# Patient Record
Sex: MALE | Race: AMERICAN INDIAN OR ALASKA NATIVE | Employment: UNEMPLOYED | URBAN - METROPOLITAN AREA
[De-identification: names, ages, dates, MRNs, and addresses within clinical notes are randomized per-mention and may not be internally consistent; named-entity substitution may affect disease eponyms.]

---

## 2018-06-10 ENCOUNTER — HOSPITAL ENCOUNTER (EMERGENCY)
Age: 28
Discharge: PSYCHIATRIC OR SUBSTANCE USE FACILITY | End: 2018-06-11
Attending: EMERGENCY MEDICINE
Payer: COMMERCIAL

## 2018-06-10 DIAGNOSIS — D72.829 LEUKOCYTOSIS, UNSPECIFIED TYPE: ICD-10-CM

## 2018-06-10 DIAGNOSIS — F22 DELUSIONAL THOUGHTS (HCC): Primary | ICD-10-CM

## 2018-06-10 LAB
ABSOLUTE EOS #: 0.09 K/UL (ref 0–0.44)
ABSOLUTE IMMATURE GRANULOCYTE: 0.07 K/UL (ref 0–0.3)
ABSOLUTE LYMPH #: 2.23 K/UL (ref 1.1–3.7)
ABSOLUTE MONO #: 1.1 K/UL (ref 0.1–1.2)
ACETAMINOPHEN LEVEL: <5 UG/ML (ref 10–30)
ALBUMIN SERPL-MCNC: 4.7 G/DL (ref 3.5–5.2)
ALBUMIN/GLOBULIN RATIO: 1.3 (ref 1–2.5)
ALP BLD-CCNC: 97 U/L (ref 40–129)
ALT SERPL-CCNC: 26 U/L (ref 5–41)
AMPHETAMINE SCREEN URINE: NEGATIVE
ANION GAP SERPL CALCULATED.3IONS-SCNC: 14 MMOL/L (ref 9–17)
AST SERPL-CCNC: 14 U/L
BARBITURATE SCREEN URINE: NEGATIVE
BASOPHILS # BLD: 0 % (ref 0–2)
BASOPHILS ABSOLUTE: 0.06 K/UL (ref 0–0.2)
BENZODIAZEPINE SCREEN, URINE: NEGATIVE
BILIRUB SERPL-MCNC: 0.27 MG/DL (ref 0.3–1.2)
BILIRUBIN URINE: NEGATIVE
BUN BLDV-MCNC: 11 MG/DL (ref 6–20)
BUN/CREAT BLD: ABNORMAL (ref 9–20)
BUPRENORPHINE URINE: NORMAL
CALCIUM SERPL-MCNC: 9.6 MG/DL (ref 8.6–10.4)
CANNABINOID SCREEN URINE: NEGATIVE
CHLORIDE BLD-SCNC: 101 MMOL/L (ref 98–107)
CO2: 25 MMOL/L (ref 20–31)
COCAINE METABOLITE, URINE: NEGATIVE
COLOR: YELLOW
COMMENT UA: ABNORMAL
CREAT SERPL-MCNC: 0.71 MG/DL (ref 0.7–1.2)
DIFFERENTIAL TYPE: ABNORMAL
EOSINOPHILS RELATIVE PERCENT: 1 % (ref 1–4)
ETHANOL PERCENT: <0.01 %
ETHANOL: <10 MG/DL
GFR AFRICAN AMERICAN: >60 ML/MIN
GFR NON-AFRICAN AMERICAN: >60 ML/MIN
GFR SERPL CREATININE-BSD FRML MDRD: ABNORMAL ML/MIN/{1.73_M2}
GFR SERPL CREATININE-BSD FRML MDRD: ABNORMAL ML/MIN/{1.73_M2}
GLUCOSE BLD-MCNC: 95 MG/DL (ref 70–99)
GLUCOSE URINE: NEGATIVE
HCT VFR BLD CALC: 45.2 % (ref 40.7–50.3)
HEMOGLOBIN: 15.1 G/DL (ref 13–17)
IMMATURE GRANULOCYTES: 0 %
KETONES, URINE: ABNORMAL
LEUKOCYTE ESTERASE, URINE: NEGATIVE
LYMPHOCYTES # BLD: 12 % (ref 24–43)
MCH RBC QN AUTO: 29.7 PG (ref 25.2–33.5)
MCHC RBC AUTO-ENTMCNC: 33.4 G/DL (ref 28.4–34.8)
MCV RBC AUTO: 88.8 FL (ref 82.6–102.9)
MDMA URINE: NORMAL
METHADONE SCREEN, URINE: NEGATIVE
METHAMPHETAMINE, URINE: NORMAL
MONOCYTES # BLD: 6 % (ref 3–12)
NITRITE, URINE: NEGATIVE
NRBC AUTOMATED: 0 PER 100 WBC
OPIATES, URINE: NEGATIVE
OXYCODONE SCREEN URINE: NEGATIVE
PDW BLD-RTO: 12.5 % (ref 11.8–14.4)
PH UA: 6.5 (ref 5–8)
PHENCYCLIDINE, URINE: NEGATIVE
PLATELET # BLD: 250 K/UL (ref 138–453)
PLATELET ESTIMATE: ABNORMAL
PMV BLD AUTO: 8.6 FL (ref 8.1–13.5)
POTASSIUM SERPL-SCNC: 4.1 MMOL/L (ref 3.7–5.3)
PROPOXYPHENE, URINE: NORMAL
PROTEIN UA: NEGATIVE
RBC # BLD: 5.09 M/UL (ref 4.21–5.77)
RBC # BLD: ABNORMAL 10*6/UL
SALICYLATE LEVEL: <1 MG/DL (ref 3–10)
SEG NEUTROPHILS: 81 % (ref 36–65)
SEGMENTED NEUTROPHILS ABSOLUTE COUNT: 15.23 K/UL (ref 1.5–8.1)
SODIUM BLD-SCNC: 140 MMOL/L (ref 135–144)
SPECIFIC GRAVITY UA: 1.01 (ref 1–1.03)
TEST INFORMATION: NORMAL
TOTAL PROTEIN: 8.3 G/DL (ref 6.4–8.3)
TOXIC TRICYCLIC SC,BLOOD: NEGATIVE
TRICYCLIC ANTIDEPRESSANTS, UR: NORMAL
TSH SERPL DL<=0.05 MIU/L-ACNC: 1.28 MIU/L (ref 0.3–5)
TURBIDITY: CLEAR
URINE HGB: NEGATIVE
UROBILINOGEN, URINE: NORMAL
WBC # BLD: 18.8 K/UL (ref 3.5–11.3)
WBC # BLD: ABNORMAL 10*3/UL

## 2018-06-10 PROCEDURE — 81003 URINALYSIS AUTO W/O SCOPE: CPT

## 2018-06-10 PROCEDURE — G0384 LEV 5 HOSP TYPE B ED VISIT: HCPCS

## 2018-06-10 PROCEDURE — 80053 COMPREHEN METABOLIC PANEL: CPT

## 2018-06-10 PROCEDURE — 84443 ASSAY THYROID STIM HORMONE: CPT

## 2018-06-10 PROCEDURE — 85025 COMPLETE CBC W/AUTO DIFF WBC: CPT

## 2018-06-10 PROCEDURE — 80307 DRUG TEST PRSMV CHEM ANLYZR: CPT

## 2018-06-10 PROCEDURE — G0480 DRUG TEST DEF 1-7 CLASSES: HCPCS

## 2018-06-10 RX ORDER — BENZTROPINE MESYLATE 1 MG/1
1 TABLET ORAL 3 TIMES DAILY
COMMUNITY
End: 2018-09-15

## 2018-06-10 RX ORDER — ZOLPIDEM TARTRATE 10 MG/1
TABLET ORAL NIGHTLY PRN
Status: ON HOLD | COMMUNITY
End: 2018-08-27

## 2018-06-10 RX ORDER — HYDROXYZINE PAMOATE 50 MG/1
50 CAPSULE ORAL 3 TIMES DAILY PRN
Status: ON HOLD | COMMUNITY
End: 2018-08-27

## 2018-06-10 ASSESSMENT — ENCOUNTER SYMPTOMS
ABDOMINAL PAIN: 0
SHORTNESS OF BREATH: 0
BACK PAIN: 0
EYE PAIN: 0
COUGH: 0
SORE THROAT: 0
VOMITING: 0
NAUSEA: 0

## 2018-06-11 VITALS
TEMPERATURE: 97.7 F | HEIGHT: 72 IN | BODY MASS INDEX: 24.65 KG/M2 | OXYGEN SATURATION: 100 % | RESPIRATION RATE: 14 BRPM | HEART RATE: 65 BPM | SYSTOLIC BLOOD PRESSURE: 134 MMHG | DIASTOLIC BLOOD PRESSURE: 74 MMHG | WEIGHT: 182 LBS

## 2018-08-26 ENCOUNTER — HOSPITAL ENCOUNTER (EMERGENCY)
Age: 28
Discharge: PSYCHIATRIC OR SUBSTANCE USE FACILITY | End: 2018-08-26
Attending: EMERGENCY MEDICINE
Payer: COMMERCIAL

## 2018-08-26 ENCOUNTER — HOSPITAL ENCOUNTER (INPATIENT)
Age: 28
LOS: 18 days | Discharge: HOME | DRG: 750 | End: 2018-09-13
Attending: PSYCHIATRY & NEUROLOGY | Admitting: PSYCHIATRY & NEUROLOGY
Payer: COMMERCIAL

## 2018-08-26 VITALS
TEMPERATURE: 98.8 F | BODY MASS INDEX: 24.65 KG/M2 | DIASTOLIC BLOOD PRESSURE: 90 MMHG | HEIGHT: 72 IN | WEIGHT: 182 LBS | RESPIRATION RATE: 15 BRPM | OXYGEN SATURATION: 99 % | HEART RATE: 99 BPM | SYSTOLIC BLOOD PRESSURE: 140 MMHG

## 2018-08-26 DIAGNOSIS — F20.0 PARANOID SCHIZOPHRENIA (HCC): Primary | ICD-10-CM

## 2018-08-26 PROCEDURE — 1240000000 HC EMOTIONAL WELLNESS R&B

## 2018-08-26 PROCEDURE — 99285 EMERGENCY DEPT VISIT HI MDM: CPT

## 2018-08-27 PROBLEM — F20.9 SCHIZOPHRENIA (HCC): Status: ACTIVE | Noted: 2018-08-27

## 2018-08-27 PROCEDURE — 90792 PSYCH DIAG EVAL W/MED SRVCS: CPT | Performed by: PSYCHIATRY & NEUROLOGY

## 2018-08-27 PROCEDURE — 1240000000 HC EMOTIONAL WELLNESS R&B

## 2018-08-27 PROCEDURE — 6370000000 HC RX 637 (ALT 250 FOR IP): Performed by: PSYCHIATRY & NEUROLOGY

## 2018-08-27 RX ORDER — ZOLPIDEM TARTRATE 10 MG/1
10 TABLET ORAL NIGHTLY PRN
Status: ON HOLD | COMMUNITY
End: 2018-10-03 | Stop reason: HOSPADM

## 2018-08-27 RX ORDER — PROPRANOLOL HYDROCHLORIDE 20 MG/1
10 TABLET ORAL 2 TIMES DAILY
Status: DISCONTINUED | OUTPATIENT
Start: 2018-08-27 | End: 2018-09-07

## 2018-08-27 RX ORDER — M-VIT,TX,IRON,MINS/CALC/FOLIC 27MG-0.4MG
1 TABLET ORAL DAILY
COMMUNITY

## 2018-08-27 RX ORDER — HALOPERIDOL DECANOATE 100 MG/ML
100 INJECTION INTRAMUSCULAR
Status: DISCONTINUED | OUTPATIENT
Start: 2018-09-18 | End: 2018-09-06

## 2018-08-27 RX ORDER — MAGNESIUM HYDROXIDE/ALUMINUM HYDROXICE/SIMETHICONE 120; 1200; 1200 MG/30ML; MG/30ML; MG/30ML
15 SUSPENSION ORAL EVERY 6 HOURS PRN
Status: DISCONTINUED | OUTPATIENT
Start: 2018-08-27 | End: 2018-09-13 | Stop reason: HOSPADM

## 2018-08-27 RX ORDER — ZOLPIDEM TARTRATE 10 MG/1
10 TABLET ORAL NIGHTLY PRN
Status: DISCONTINUED | OUTPATIENT
Start: 2018-08-27 | End: 2018-09-13 | Stop reason: HOSPADM

## 2018-08-27 RX ORDER — HALOPERIDOL 5 MG/ML
5 INJECTION INTRAMUSCULAR EVERY 4 HOURS PRN
Status: DISCONTINUED | OUTPATIENT
Start: 2018-08-27 | End: 2018-09-13 | Stop reason: HOSPADM

## 2018-08-27 RX ORDER — BENZTROPINE MESYLATE 1 MG/ML
2 INJECTION INTRAMUSCULAR; INTRAVENOUS 2 TIMES DAILY PRN
Status: DISCONTINUED | OUTPATIENT
Start: 2018-08-27 | End: 2018-09-13 | Stop reason: HOSPADM

## 2018-08-27 RX ORDER — HYDROXYZINE HYDROCHLORIDE 25 MG/1
50 TABLET, FILM COATED ORAL 3 TIMES DAILY PRN
Status: DISCONTINUED | OUTPATIENT
Start: 2018-08-27 | End: 2018-09-13 | Stop reason: HOSPADM

## 2018-08-27 RX ORDER — HALOPERIDOL DECANOATE 100 MG/ML
INJECTION INTRAMUSCULAR
Status: ON HOLD | COMMUNITY
End: 2018-09-13 | Stop reason: HOSPADM

## 2018-08-27 RX ORDER — NICOTINE 21 MG/24HR
1 PATCH, TRANSDERMAL 24 HOURS TRANSDERMAL DAILY
Status: DISCONTINUED | OUTPATIENT
Start: 2018-08-27 | End: 2018-09-13 | Stop reason: HOSPADM

## 2018-08-27 RX ORDER — TRAZODONE HYDROCHLORIDE 50 MG/1
50 TABLET ORAL NIGHTLY PRN
Status: DISCONTINUED | OUTPATIENT
Start: 2018-08-27 | End: 2018-09-13 | Stop reason: HOSPADM

## 2018-08-27 RX ORDER — PROPRANOLOL HYDROCHLORIDE 10 MG/1
10 TABLET ORAL 2 TIMES DAILY
COMMUNITY

## 2018-08-27 RX ORDER — NICOTINE 21 MG/24HR
1 PATCH, TRANSDERMAL 24 HOURS TRANSDERMAL EVERY 24 HOURS
Status: ON HOLD | COMMUNITY
End: 2018-10-03 | Stop reason: HOSPADM

## 2018-08-27 RX ORDER — OLANZAPINE 5 MG/1
5 TABLET ORAL
Status: ACTIVE | OUTPATIENT
Start: 2018-08-27 | End: 2018-08-27

## 2018-08-27 RX ORDER — BUSPIRONE HYDROCHLORIDE 15 MG/1
15 TABLET ORAL 3 TIMES DAILY
Status: ON HOLD | COMMUNITY
End: 2018-09-13 | Stop reason: HOSPADM

## 2018-08-27 RX ORDER — ACETAMINOPHEN 325 MG/1
650 TABLET ORAL EVERY 4 HOURS PRN
Status: DISCONTINUED | OUTPATIENT
Start: 2018-08-27 | End: 2018-09-13 | Stop reason: HOSPADM

## 2018-08-27 RX ORDER — HALOPERIDOL DECANOATE 100 MG/ML
INJECTION INTRAMUSCULAR
Status: ON HOLD | COMMUNITY
End: 2018-08-27

## 2018-08-27 RX ORDER — M-VIT,TX,IRON,MINS/CALC/FOLIC 27MG-0.4MG
1 TABLET ORAL DAILY
Status: DISCONTINUED | OUTPATIENT
Start: 2018-08-27 | End: 2018-09-13 | Stop reason: HOSPADM

## 2018-08-27 RX ORDER — BENZTROPINE MESYLATE 1 MG/1
1 TABLET ORAL 3 TIMES DAILY
Status: DISCONTINUED | OUTPATIENT
Start: 2018-08-27 | End: 2018-09-06

## 2018-08-27 RX ORDER — BUSPIRONE HYDROCHLORIDE 15 MG/1
15 TABLET ORAL 3 TIMES DAILY
Status: DISCONTINUED | OUTPATIENT
Start: 2018-08-27 | End: 2018-09-13 | Stop reason: HOSPADM

## 2018-08-27 RX ADMIN — BENZTROPINE MESYLATE 1 MG: 1 TABLET ORAL at 21:23

## 2018-08-27 RX ADMIN — ZOLPIDEM TARTRATE 10 MG: 10 TABLET, FILM COATED ORAL at 21:22

## 2018-08-27 RX ADMIN — BUSPIRONE HYDROCHLORIDE 15 MG: 15 TABLET ORAL at 21:22

## 2018-08-27 RX ADMIN — PROPRANOLOL HYDROCHLORIDE 10 MG: 20 TABLET ORAL at 21:22

## 2018-08-27 ASSESSMENT — SLEEP AND FATIGUE QUESTIONNAIRES
DIFFICULTY ARISING: NO
DIFFICULTY STAYING ASLEEP: NO
SLEEP PATTERN: NORMAL
RESTFUL SLEEP: YES
DIFFICULTY FALLING ASLEEP: NO
AVERAGE NUMBER OF SLEEP HOURS: 8
DO YOU USE A SLEEP AID: YES
DO YOU HAVE DIFFICULTY SLEEPING: NO

## 2018-08-27 ASSESSMENT — LIFESTYLE VARIABLES
HISTORY_ALCOHOL_USE: NO
HISTORY_ALCOHOL_USE: NO

## 2018-08-27 ASSESSMENT — PAIN SCALES - GENERAL: PAINLEVEL_OUTOF10: 0
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
## 2018-08-27 NOTE — BH NOTE
`[facility]  Admission Note     Admission Type:   Admission Type: Voluntary    Reason for admission:  Reason for Admission: Voluntary from [facility]. Paranoia, delusional, and unable to care for self. Feels people in group home want to kill him, slit wrists. Denies all just states he is here because of \"chaos in the group home\"    PATIENT STRENGTHS:  Strengths: Medication Compliance, No significant Physical Illness    Patient Strengths and Limitations:  Limitations: Unrealistic self-view, Tendency to isolate self    Addictive Behavior:   Addictive Behavior  In the past 3 months, have you felt or has someone told you that you have a problem with:  : None  Do you have a history of Chemical Use?: No  Do you have a history of Alcohol Use?: No  Do you have a history of Street Drug Abuse?: No  Histroy of Prescripton Drug Abuse?: No    Medical Problems:   History reviewed. No pertinent past medical history.     Status EXAM:  Status and Exam  Normal: No  Facial Expression: Avoids Gaze, Flat  Affect: Blunt  Level of Consciousness: Alert  Mood:Normal: No  Mood: Depressed, Anxious  Motor Activity:Normal: Yes  Interview Behavior: Cooperative, Evasive  Preception: [place] to Person, [place] to Time, [place] to Place  Attention:Normal: No  Attention: Distractible  Thought Processes: Blocking  Thought Content:Normal: No  Thought Content: Paranoia, Preoccupations  Hallucinations: None  Delusions: Yes  Delusions: Persecution  Memory:Normal: No  Memory: Poor Recent  Insight and Judgment: No  Insight and Judgment: Poor Judgment, Poor Insight  Present Suicidal Ideation: No  Present Homicidal Ideation: No    Tobacco Screening:  Practical Counseling, on admission, taryn X, if applicable and completed (first 3 are required if patient doesn't refuse):            ( )  Recognizing danger situations (included triggers and roadblocks)                    ( )  Coping skills (new ways to manage stress, exercise, relaxation techniques,

## 2018-08-27 NOTE — ED NOTES
Pt resting in bed, NAD noted rr even and unlabored. Pt denies any needs at this time, will continue to monitor.      Juanis Deal, RN  08/26/18 9206

## 2018-08-27 NOTE — ED PROVIDER NOTES
NECK: Normal ROM, No jugular venous distention, No meningeal signs. RESPIRATORY CHEST: No respiratory distress. ABDOMEN: Abdomen is nontender, No distension. No pulsatile masses palpated. BACK:  No midline bony tenderness to palpation. UPPER EXTREMITY: Inspection normal, No cyanosis. LOWER EXTREMITY: Pulses are 2+ and equal bilaterally with cap refill < 2 seconds. NEURO: GCS is 15.  5/5 strength in bilateral lower extremities with sensation to light touch intact. DTR's are 2+ bilaterally with bilateral downgoing babinski's. DP/PT pulses are 2+, strong, and equal bilaterally. SKIN: Skin is warm, Skin is dry. PSYCHIATRIC: Oriented X 3, affect is Appropriate. is  having paranoid delusions    Diagnostic Results     LABS:  Not indicated    RADIOLOGY:  Not indicated    Medical decision making  (MDM) / ED Course     This patient presents with symptoms consistent with an acute exacerbation of an underlying psychiatric disorder. Differential diagnosis includes depression, anxiety, bipolar disorder, schizophrenia, adjustment disorder, borderline personality disorder, malingering, delusional, disorder, substance induced psychosis, delusional disorder, metabolic dysfunction, toxicologic exposure. Presentation not consistent with acute organic causes to include delirium, dementia or drug induced disorders (acute ingestions or withdrawal; no evidence of toxidrome). The patient denies suicidal ideation and homicidal ideation and presents no risk to himself or others. Social work has been consulted to complete a more comprehensive psychiatric evaluation and to provide outpatient psychiatric resources. Procedures     None    Final impression      1.  [facility]         Disposition with plan     Disposition: transfer to José Pierre MD  Emergency Medicine Resident    (Please note that portions of this note were completed with a voice recognition program.  Efforts were made

## 2018-08-27 NOTE — ED NOTES
KATYA faxed voluntary to [facility] at [address] also faxed medical form to Michael Rosas 91, LSW  08/26/18 8216
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
## 2018-08-27 NOTE — PLAN OF CARE
Poor Insight  Present Suicidal Ideation: No  Present Homicidal Ideation: No    EDUCATION:   Learner Progress Toward Treatment Goals:  Reviewed group plans and strategies for care    Method:  Group therapy, medication compliance, individualized assessments and care planning    Outcome:  Needs reinforcement    PATIENT GOALS:  Pt did not identify, to be discussed with patient within 72 hours.     PLAN/TREATMENT RECOMMENDATIONS:   Group therapy, medications compliance, goal setting, individualized assessments and care, continue to monitor pt on unit      SHORT-TERM GOALS:   Time frame for Short-Term Goals:  5-7 days    LONG-TERM GOALS:  Time frame for Long-Term Goals:  6 months    Members Present in Team Meeting:     MELIDA Corona    Goal: Absence of homicidal ideation  Absence of homicidal ideation   Outcome: Ongoing      Problem: Tobacco Use:  Goal: Inpatient tobacco use cessation counseling participation  Inpatient tobacco use cessation counseling participation   Outcome: Ongoing

## 2018-08-28 PROCEDURE — 99232 SBSQ HOSP IP/OBS MODERATE 35: CPT | Performed by: PSYCHIATRY & NEUROLOGY

## 2018-08-28 PROCEDURE — 1240000000 HC EMOTIONAL WELLNESS R&B

## 2018-08-28 PROCEDURE — 6370000000 HC RX 637 (ALT 250 FOR IP): Performed by: PSYCHIATRY & NEUROLOGY

## 2018-08-28 RX ADMIN — BENZTROPINE MESYLATE 1 MG: 1 TABLET ORAL at 20:08

## 2018-08-28 RX ADMIN — BUSPIRONE HYDROCHLORIDE 15 MG: 15 TABLET ORAL at 14:12

## 2018-08-28 RX ADMIN — PROPRANOLOL HYDROCHLORIDE 10 MG: 20 TABLET ORAL at 20:08

## 2018-08-28 RX ADMIN — BUSPIRONE HYDROCHLORIDE 15 MG: 15 TABLET ORAL at 09:56

## 2018-08-28 RX ADMIN — PROPRANOLOL HYDROCHLORIDE 10 MG: 20 TABLET ORAL at 09:57

## 2018-08-28 RX ADMIN — ZOLPIDEM TARTRATE 10 MG: 10 TABLET, FILM COATED ORAL at 20:08

## 2018-08-28 RX ADMIN — MULTIPLE VITAMINS W/ MINERALS TAB 1 TABLET: TAB at 09:57

## 2018-08-28 RX ADMIN — BUSPIRONE HYDROCHLORIDE 15 MG: 15 TABLET ORAL at 20:08

## 2018-08-28 RX ADMIN — BENZTROPINE MESYLATE 1 MG: 1 TABLET ORAL at 09:57

## 2018-08-28 RX ADMIN — BENZTROPINE MESYLATE 1 MG: 1 TABLET ORAL at 14:12

## 2018-08-28 NOTE — BH NOTE
Psychoeducation Group Note    Date: 08/28/18  Start Time: 1610  End Time: 1650    Number Participants in Group:  7/19    Goal:  Patient will demonstrate increased interpersonal interaction   Topic: 7 Dimensions of wellness/Attitude    Discipline Responsible:   OT  AT   x Nsg.  RT  Other       Participation Level:     None  Minimal    Active Listener x Interactive    Monopolizing         Participation Quality:  x Appropriate  Inappropriate   x       Attentive        Intrusive   x       Sharing        Resistant   x       Supportive        Lethargic       Affective:   x Congruent  Incongruent  Blunted  Flat    Constricted  Anxious  Elated  Angry    Labile  Depressed  Other         Cognitive:  x Alert x Oriented PPTP     Concentration x G  F  P   Attention Span x G  F  P   Short-Term Memory  G  F  P   Long-Term Memory  G  F  P   ProblemSolving/  Decision Making x G  F  P   Ability to Process  Information x G  F  P      Contributing Factors             Delusional             Hallucinating             Flight of Ideas             Other:       Modes of Intervention:  x Education x Support x Exploration    Clarifying x Problem Solving  Confrontation    Socialization  Limit Setting  Reality Testing    Activity  Movement  Media    Other:            Response to Learning:  x Able to verbalize current knowledge/experience    Able to verbalize/acknowledge new learning    Able to retain information    Capable of insight    Able to change behavior    Progressing to goal    Other:        Comments:

## 2018-08-28 NOTE — PLAN OF CARE
Problem: Anger Management/Homicidal Ideation:  Goal: Absence of homicidal ideation  Absence of homicidal ideation   Outcome: Ongoing  Denies SI /HI and remains free from harm att.  Denies any frustration at,t pt remains calm and cooperative

## 2018-08-28 NOTE — H&P
HISTORY and Vipul Vazquez 5747         NAME:  Gideon Mitchell  MRN: [id number]   YOB: 1990   Date: 8/28/2018   Age: 32 y.o. Gender: male       COMPLAINT AND PRESENT HISTORY:     Gideon Mitchell is a 32 yr old male who has Bipolar and depression, He lives in a [address] and he felt the people there were trying to hurt him, He was afraid, He felt paranoid so he went to [facility] because he knew that place was safe and the Dr Admitted him here, He says he did not wish to hurt himself nor let the group home people hurt him, They were fighting over his RSS check which pays the rent,    He graduated from [address] [place] [address] and he did not go to college, Has worked [employer], and other jobs and worked for a friends, but does not have a career, He had a very short term incarceration in 2009 and he said that changed his life forever, He had not done anything wrong,     DIAGNOSTIC RESULTS   Radiology:     EKG:    Labs:  CBC: No results for input(s): WBC, HGB, PLT in the last 72 hours. BMP:  No results for input(s): NA, K, CL, CO2, BUN, CREATININE, GLUCOSE in the last 72 hours. Hepatic: No results for input(s): AST, ALT, ALB, BILITOT, ALKPHOS in the last 72 hours. CARDIAC ENZY: No results for input(s): CKTOTAL, CKMB, CKMBINDEX, TROPONINT, MYOGLOBIN in the last 72 hours. INR: No results for input(s): INR in the last 72 hours. BNP: No results for input(s): BNP in the last 72 hours. ABGs: No results found for: PHART, PO2ART, [id number]  Lipids: No results for input(s): CHOL, HDL in the last 72 hours. Invalid input(s): LDLCALCU  U/A:  Lab Results   Component Value Date    COLORU YELLOW 06/10/2018    SPECGRAV 1.010 06/10/2018    LEUKOCYTESUR NEGATIVE 06/10/2018    GLUCOSEU NEGATIVE 06/10/2018       PAST MEDICAL HISTORY   History reviewed. No pertinent past medical history.     Pt denies any history of Diabetes mellitus type 2, hypertension, stroke, heart disease, COPD, Asthma, GERD, HLD, Cancer,

## 2018-08-28 NOTE — FLOWSHEET NOTE
08/28/18 1400   Encounter Summary   Services provided to: Patient   Referral/Consult From: (Spirituality group)   Continue Visiting (8/28/18)   Complexity of Encounter Low   Length of Encounter 30 minutes   Spiritual/Catholic   Type Spiritual support   Intervention (Patient attended spirituality group led by Fr. Leisa Gross)

## 2018-08-28 NOTE — PLAN OF CARE
Problem: Anger Management/Homicidal Ideation:  Goal: Ability to verbalize frustrations and anger appropriately will improve  Ability to verbalize frustrations and anger appropriately will improve   Outcome: Ongoing  [address] [place] [address]  Day 3 Interdisciplinary Treatment Plan NOTE    Review Date & Time: 8/28/18 0927    Admission Type:   Admission Type: Voluntary    Reason for admission:  Reason for Admission: Voluntary from [facility]. Paranoia, delusional, and unable to care for self. Feels people in group home want to kill him, slit wrists. Denies all just states he is here because of \"chaos in the group home\"  Estimated Length of Stay:  5-7 days  Estimated Discharge Date Update:  9/2/18 to be determined by physician    PATIENT STRENGTHS:  Patient Strengths:Strengths: Communication, Connection to output provider, Medication Compliance, No significant Physical Illness  Patient Strengths and Limitations:Limitations: Unrealistic self-view, Tendency to isolate self  Addictive Behavior:Addictive Behavior  In the past 3 months, have you felt or has someone told you that you have a problem with:  : None  Do you have a history of Chemical Use?: No  Do you have a history of Alcohol Use?: No  Do you have a history of Street Drug Abuse?: No  Histroy of Prescripton Drug Abuse?: No  Medical Problems:History reviewed. No pertinent past medical history.     Risk:  Fall RiskTotal: 65  Roque Scale Roque Scale Score: 23  BVC Total: 0  Change in scores:  No Changes to plan of Care:  No    Status EXAM:   Status and Exam  Normal: No  Facial Expression: Flat  Affect: Appropriate  Level of Consciousness: Alert  Mood:Normal: No  Mood: Anxious  Motor Activity:Normal: Yes  Motor Activity: Decreased  Interview Behavior: Cooperative, Evasive  Preception: [place] to Person, [place] to Time, [place] to Place, [place] to Situation  Attention:Normal: No  Attention: Distractible  Thought Processes: Blocking  Thought Content:Normal: No  Thought Content: Poverty of Content, Preoccupations  Hallucinations: None  Delusions: Yes  Delusions: Persecution  Memory:Normal: No  Memory: Poor Recent  Insight and Judgment: No  Insight and Judgment: Poor Judgment, Poor Insight  Present Suicidal Ideation: No  Present Homicidal Ideation: No    Daily Assessment Last Entry:   Daily Sleep (WDL): Within Defined Limits         Patient Currently in Pain: No  Daily Nutrition (WDL): Within Defined Limits    Patient Monitoring:  Frequency of Checks: 4 times per hour, close    Psychiatric Symptoms:   Depression Symptoms  Depression Symptoms: No problems reported or observed. Anxiety Symptoms  Anxiety Symptoms: No problems reported or observed. Deana Symptoms  Deana Symptoms: No problems reported or observed. Psychosis Symptoms  Delusion Type: No problems reported or observed.     Suicide Risk CSSR-S:  1) Within the past month, have you wished you were dead or wished you could go to sleep and not wake up? : NO  2) Within the past month, have you actually had any thoughts of killing yourself? : NO  6)  Have you ever done anything, started to do anything, or prepared to do anything to end your life?: NO  Change in Result:  no Change in Plan of care:  no      EDUCATION:   Learner Progress Toward Treatment Goals:   Reviewed results and recommendations of this team, Reviewed group plan and strategies, Reviewed signs, symptoms and risk of self harm and violent behavior, Reviewed goals and plan of care    Method:  small group, individual verbal education    Outcome:   Verbalized by patient but needs reinforcement to obtain goals    PATIENT GOALS:  Short term: Talk to  one on one  Long term:  Find a new group home    PLAN/TREATMENT RECOMMENDATIONS UPDATE:  continue with group therapies, increased socialization, continue planning for after discharge goals, continue with medication compliance    SHORT-TERM GOALS UPDATE:   Time frame for Short-Term Goals:  5-7

## 2018-08-29 PROCEDURE — 99232 SBSQ HOSP IP/OBS MODERATE 35: CPT | Performed by: NURSE PRACTITIONER

## 2018-08-29 PROCEDURE — 6370000000 HC RX 637 (ALT 250 FOR IP): Performed by: NURSE PRACTITIONER

## 2018-08-29 PROCEDURE — 6370000000 HC RX 637 (ALT 250 FOR IP): Performed by: PSYCHIATRY & NEUROLOGY

## 2018-08-29 PROCEDURE — 1240000000 HC EMOTIONAL WELLNESS R&B

## 2018-08-29 RX ORDER — LAMOTRIGINE 25 MG/1
25 TABLET ORAL DAILY
Status: DISCONTINUED | OUTPATIENT
Start: 2018-08-29 | End: 2018-09-06

## 2018-08-29 RX ADMIN — MULTIPLE VITAMINS W/ MINERALS TAB 1 TABLET: TAB at 08:36

## 2018-08-29 RX ADMIN — BENZTROPINE MESYLATE 1 MG: 1 TABLET ORAL at 08:36

## 2018-08-29 RX ADMIN — BUSPIRONE HYDROCHLORIDE 15 MG: 15 TABLET ORAL at 20:31

## 2018-08-29 RX ADMIN — PROPRANOLOL HYDROCHLORIDE 10 MG: 20 TABLET ORAL at 08:35

## 2018-08-29 RX ADMIN — BENZTROPINE MESYLATE 1 MG: 1 TABLET ORAL at 14:30

## 2018-08-29 RX ADMIN — BENZTROPINE MESYLATE 1 MG: 1 TABLET ORAL at 20:32

## 2018-08-29 RX ADMIN — LAMOTRIGINE 25 MG: 25 TABLET ORAL at 17:25

## 2018-08-29 RX ADMIN — BUSPIRONE HYDROCHLORIDE 15 MG: 15 TABLET ORAL at 14:29

## 2018-08-29 RX ADMIN — BUSPIRONE HYDROCHLORIDE 15 MG: 15 TABLET ORAL at 08:35

## 2018-08-29 RX ADMIN — ZOLPIDEM TARTRATE 10 MG: 10 TABLET, FILM COATED ORAL at 20:32

## 2018-08-29 RX ADMIN — PROPRANOLOL HYDROCHLORIDE 10 MG: 20 TABLET ORAL at 20:31

## 2018-08-29 NOTE — PROGRESS NOTES
Charting done this shift reviewed by Electronically signed by Rock Pulido RN on 8/29/2018 at 3:21 AM

## 2018-08-29 NOTE — PROGRESS NOTES
Psychiatric Progress Note      Pertinent History & Psychiatric Examination    HPI: Marquis Wilburn is seen in his room today. He reports his mood is ok. Denies depression, expressed moderate anxiety. Sleep and appetite are ok. Denies SI/HI/AVH. Mild thought blocking noted. He continues to express paranoia and fear of returning to group home. Isolative and seclusive to his room. Will add Lamictal as previously planned and continue to monitor. Complaints of Pain: none  Functioning Relationships: good support system      Mental Status Evaluation:  Orientation: alertness: alert   Mood:. dysthymic      Affect:  Blunted, mood congruent      Appearance:  bearded and disheveled   Activity:  Within Normal Limits   Gait/Posture: Normal   Speech:  normal pitch and normal volume   Thought Process:  blocked and loose associations   Thought Content:  delusions   Sensorium:  person, place and time/date   Cognition:  grossly intact   Memory: WNL   Insight:  limited   Judgment: limited   Suicidal Ideations: denies suicidal ideation   Homicidal Ideations: Negative for homicidal ideation      Medication Side Effects: absent       Attention Span attention span appeared shorter than expected for age     Clinical Assessment Medical Decision    Axis I: paranoid schizophrenia    Precautions with Justification:   none    Medication Review/Mgmt: Receives Haldol Decanoate 100mg, last on 8/21/18    Medical Issues: History reviewed. No pertinent past medical history. Assessment of Risk for Harm to Self/Others:  Severe paranoia, single,  male    PLAN: Cont.  Haldol Decanoate, Lamictal 25mg started on admission    Patient's Response to Treatment: positive    Batsheva Marina CNP  8/29/2018  3:33 PM

## 2018-08-29 NOTE — PLAN OF CARE
Problem: Anger Management/Homicidal Ideation:  Goal: Ability to verbalize frustrations and anger appropriately will improve  Ability to verbalize frustrations and anger appropriately will improve   Outcome: Ongoing  Pt did not participate in Recovery Group at 1430 despite staff encouragement.

## 2018-08-30 LAB
ABSOLUTE EOS #: 0.5 K/UL (ref 0–0.4)
ABSOLUTE IMMATURE GRANULOCYTE: ABNORMAL K/UL (ref 0–0.3)
ABSOLUTE LYMPH #: 3.7 K/UL (ref 1–4.8)
ABSOLUTE MONO #: 0.6 K/UL (ref 0.1–1.3)
ALBUMIN SERPL-MCNC: 4.5 G/DL (ref 3.5–5.2)
ALBUMIN/GLOBULIN RATIO: ABNORMAL (ref 1–2.5)
ALP BLD-CCNC: 57 U/L (ref 40–129)
ALT SERPL-CCNC: 16 U/L (ref 5–41)
ANION GAP SERPL CALCULATED.3IONS-SCNC: 9 MMOL/L (ref 9–17)
AST SERPL-CCNC: 12 U/L
BASOPHILS # BLD: 1 % (ref 0–2)
BASOPHILS ABSOLUTE: 0.1 K/UL (ref 0–0.2)
BILIRUB SERPL-MCNC: 0.2 MG/DL (ref 0.3–1.2)
BUN BLDV-MCNC: 13 MG/DL (ref 6–20)
BUN/CREAT BLD: ABNORMAL (ref 9–20)
CALCIUM SERPL-MCNC: 9.5 MG/DL (ref 8.6–10.4)
CHLORIDE BLD-SCNC: 103 MMOL/L (ref 98–107)
CHOLESTEROL/HDL RATIO: 3.6
CHOLESTEROL: 131 MG/DL
CO2: 28 MMOL/L (ref 20–31)
CREAT SERPL-MCNC: 1 MG/DL (ref 0.7–1.2)
DIFFERENTIAL TYPE: ABNORMAL
EOSINOPHILS RELATIVE PERCENT: 5 % (ref 0–4)
ESTIMATED AVERAGE GLUCOSE: 94 MG/DL
GFR AFRICAN AMERICAN: >60 ML/MIN
GFR NON-AFRICAN AMERICAN: >60 ML/MIN
GFR SERPL CREATININE-BSD FRML MDRD: ABNORMAL ML/MIN/{1.73_M2}
GFR SERPL CREATININE-BSD FRML MDRD: ABNORMAL ML/MIN/{1.73_M2}
GLUCOSE BLD-MCNC: 90 MG/DL (ref 70–99)
HBA1C MFR BLD: 4.9 % (ref 4–6)
HCT VFR BLD CALC: 45.5 % (ref 41–53)
HDLC SERPL-MCNC: 36 MG/DL
HEMOGLOBIN: 15.2 G/DL (ref 13.5–17.5)
IMMATURE GRANULOCYTES: ABNORMAL %
LDL CHOLESTEROL: 67 MG/DL (ref 0–130)
LYMPHOCYTES # BLD: 39 % (ref 24–44)
MCH RBC QN AUTO: 30.9 PG (ref 26–34)
MCHC RBC AUTO-ENTMCNC: 33.5 G/DL (ref 31–37)
MCV RBC AUTO: 92.2 FL (ref 80–100)
MONOCYTES # BLD: 7 % (ref 1–7)
NRBC AUTOMATED: ABNORMAL PER 100 WBC
PDW BLD-RTO: 14.5 % (ref 11.5–14.9)
PLATELET # BLD: 275 K/UL (ref 150–450)
PLATELET ESTIMATE: ABNORMAL
PMV BLD AUTO: 6.9 FL (ref 6–12)
POTASSIUM SERPL-SCNC: 4.1 MMOL/L (ref 3.7–5.3)
RBC # BLD: 4.93 M/UL (ref 4.5–5.9)
RBC # BLD: ABNORMAL 10*6/UL
SEG NEUTROPHILS: 48 % (ref 36–66)
SEGMENTED NEUTROPHILS ABSOLUTE COUNT: 4.7 K/UL (ref 1.3–9.1)
SODIUM BLD-SCNC: 140 MMOL/L (ref 135–144)
TOTAL PROTEIN: 6.9 G/DL (ref 6.4–8.3)
TRIGL SERPL-MCNC: 140 MG/DL
TSH SERPL DL<=0.05 MIU/L-ACNC: 1.71 MIU/L (ref 0.3–5)
VLDLC SERPL CALC-MCNC: ABNORMAL MG/DL (ref 1–30)
WBC # BLD: 9.6 K/UL (ref 3.5–11)
WBC # BLD: ABNORMAL 10*3/UL

## 2018-08-30 PROCEDURE — 6370000000 HC RX 637 (ALT 250 FOR IP): Performed by: PSYCHIATRY & NEUROLOGY

## 2018-08-30 PROCEDURE — 84443 ASSAY THYROID STIM HORMONE: CPT

## 2018-08-30 PROCEDURE — 83036 HEMOGLOBIN GLYCOSYLATED A1C: CPT

## 2018-08-30 PROCEDURE — 99232 SBSQ HOSP IP/OBS MODERATE 35: CPT | Performed by: NURSE PRACTITIONER

## 2018-08-30 PROCEDURE — 85025 COMPLETE CBC W/AUTO DIFF WBC: CPT

## 2018-08-30 PROCEDURE — 80053 COMPREHEN METABOLIC PANEL: CPT

## 2018-08-30 PROCEDURE — 6370000000 HC RX 637 (ALT 250 FOR IP): Performed by: NURSE PRACTITIONER

## 2018-08-30 PROCEDURE — 36415 COLL VENOUS BLD VENIPUNCTURE: CPT

## 2018-08-30 PROCEDURE — 1240000000 HC EMOTIONAL WELLNESS R&B

## 2018-08-30 PROCEDURE — 80061 LIPID PANEL: CPT

## 2018-08-30 RX ADMIN — PROPRANOLOL HYDROCHLORIDE 10 MG: 20 TABLET ORAL at 08:36

## 2018-08-30 RX ADMIN — BENZTROPINE MESYLATE 1 MG: 1 TABLET ORAL at 08:36

## 2018-08-30 RX ADMIN — MULTIPLE VITAMINS W/ MINERALS TAB 1 TABLET: TAB at 08:36

## 2018-08-30 RX ADMIN — BUSPIRONE HYDROCHLORIDE 15 MG: 15 TABLET ORAL at 14:35

## 2018-08-30 RX ADMIN — PROPRANOLOL HYDROCHLORIDE 10 MG: 20 TABLET ORAL at 20:30

## 2018-08-30 RX ADMIN — BUSPIRONE HYDROCHLORIDE 15 MG: 15 TABLET ORAL at 20:31

## 2018-08-30 RX ADMIN — ZOLPIDEM TARTRATE 10 MG: 10 TABLET, FILM COATED ORAL at 20:31

## 2018-08-30 RX ADMIN — BENZTROPINE MESYLATE 1 MG: 1 TABLET ORAL at 20:31

## 2018-08-30 RX ADMIN — BENZTROPINE MESYLATE 1 MG: 1 TABLET ORAL at 14:35

## 2018-08-30 RX ADMIN — LAMOTRIGINE 25 MG: 25 TABLET ORAL at 08:36

## 2018-08-30 RX ADMIN — BUSPIRONE HYDROCHLORIDE 15 MG: 15 TABLET ORAL at 08:36
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
## 2018-08-30 NOTE — BH NOTE
Pt did not participate  In wellness at 1600 choosing to rest in room despite encouragement from staff to attend.

## 2018-08-30 NOTE — PLAN OF CARE
Problem: Anger Management/Homicidal Ideation:  Goal: Absence of homicidal ideation  Absence of homicidal ideation   Outcome: Ongoing  Psychoeducation Group Note    Date: 8/30/2018  Start Time: 0845  End Time: 6197    Number Participants in Group:  9    Goal:  Patient will demonstrate increased interpersonal interaction. Topic:  Goal Setting / Community Meeting    Discipline Responsible:   OT  AT  [facility]. X RT  Other       Participation Level:     None  Minimal   x Active Listener x Interactive    Monopolizing         Participation Quality:  x Appropriate  Inappropriate   x       Attentive        Intrusive   x       Sharing        Resistant          Supportive        Lethargic       Affective:    Congruent  Incongruent  Blunted x Flat    Constricted  Anxious  Elated  Angry    Labile  Depressed  Other  Bright       Cognitive:  x Alert x Oriented PPTP     Concentration x G  F  P   Attention Span x G  F  P   Short-Term Memory x G  F  P   Long-Term Memory  G  F  P   ProblemSolving/  Decision Making x G  F  P   Ability to Process  Information x G  F  P      Contributing Factors             Delusional             Hallucinating             Flight of Ideas             Other:       Modes of Intervention:  x Education x Support x Exploration   x Clarifying x Problem Solving x Confrontation   x Socialization x Limit Setting x Reality Testing   x Activity  Movement  Media    Other:            Response to Learning:  x Able to verbalize current knowledge/experience   x Able to verbalize/acknowledge new learning   x Able to retain information   x Capable of insight    Able to change behavior   x Progressing to goal    Other:        Goal for today: Stay positive. Adjust to medications. Have a better day today than yesterday.

## 2018-08-30 NOTE — PLAN OF CARE
Problem: Anger Management/Homicidal Ideation:  Goal: Ability to verbalize frustrations and anger appropriately will improve  Ability to verbalize frustrations and anger appropriately will improve   Outcome: Ongoing  Pt did not participate in Communication Skills Group at 1330 despite staff encouragement.

## 2018-08-30 NOTE — PLAN OF CARE
Problem: Anger Management/Homicidal Ideation:  Goal: Ability to verbalize frustrations and anger appropriately will improve  Ability to verbalize frustrations and anger appropriately will improve   Outcome: Ongoing  PSYCHOTHERAPY GROUP NOTE    Date: 8/30/18  Start Time: 11 AM  End Time: 1145 AM    Number Participants in Group:  10    Goal:  Patient will demonstrate increased interpersonal interaction   Topic: Support     Discipline Responsible:   OT  AT X SW  Nsg.  RT MHP Other       Participation Level:     None  Minimal    Active Listener x Interactive    Monopolizing         Participation Quality:  x Appropriate  Inappropriate          Attentive        Intrusive          Sharing        Resistant          Supportive        Lethargic       Affective:   x Congruent  Incongruent  Blunted  Flat    Constricted  Anxious  Elated  Angry    Labile  Depressed  Other         Cognitive:  x Alert x Oriented PPTP     Concentration  G x F  P   Attention Span  G x F  P   Short-Term Memory  G x F  P   Long-Term Memory  G x F  P   ProblemSolving/  Decision Making  G  F x P   Ability to Process  Information  G  F x P      Contributing Factors   x          Delusional             Hallucinating             Flight of Ideas             Other:       Modes of Intervention:   Education x Support  Exploration    Clarifying x Problem Solving  Confrontation   x Socialization  Limit Setting  Reality Testing    Activity  Movement  Media    Other:            Response to Learning:  x Able to verbalize current knowledge/experience    Able to verbalize/acknowledge new learning    Able to retain information    Capable of insight    Able to change behavior    Progressing to goal    Other:        Comments: Anita Bowden is attempting to murder him- denies MH sx/paranoia

## 2018-08-31 PROCEDURE — 1240000000 HC EMOTIONAL WELLNESS R&B

## 2018-08-31 PROCEDURE — 99232 SBSQ HOSP IP/OBS MODERATE 35: CPT | Performed by: PSYCHIATRY & NEUROLOGY

## 2018-08-31 PROCEDURE — 6370000000 HC RX 637 (ALT 250 FOR IP): Performed by: PSYCHIATRY & NEUROLOGY

## 2018-08-31 PROCEDURE — 6370000000 HC RX 637 (ALT 250 FOR IP): Performed by: NURSE PRACTITIONER

## 2018-08-31 RX ADMIN — LAMOTRIGINE 25 MG: 25 TABLET ORAL at 08:25

## 2018-08-31 RX ADMIN — ACETAMINOPHEN 650 MG: 325 TABLET, FILM COATED ORAL at 08:36

## 2018-08-31 RX ADMIN — BUSPIRONE HYDROCHLORIDE 15 MG: 15 TABLET ORAL at 14:22

## 2018-08-31 RX ADMIN — BENZTROPINE MESYLATE 1 MG: 1 TABLET ORAL at 14:22

## 2018-08-31 RX ADMIN — ZOLPIDEM TARTRATE 10 MG: 10 TABLET, FILM COATED ORAL at 21:20

## 2018-08-31 RX ADMIN — BUSPIRONE HYDROCHLORIDE 15 MG: 15 TABLET ORAL at 21:20

## 2018-08-31 RX ADMIN — BENZTROPINE MESYLATE 1 MG: 1 TABLET ORAL at 21:20

## 2018-08-31 RX ADMIN — MULTIPLE VITAMINS W/ MINERALS TAB 1 TABLET: TAB at 08:25

## 2018-08-31 RX ADMIN — BENZTROPINE MESYLATE 1 MG: 1 TABLET ORAL at 08:25

## 2018-08-31 RX ADMIN — BUSPIRONE HYDROCHLORIDE 15 MG: 15 TABLET ORAL at 08:25

## 2018-08-31 RX ADMIN — PROPRANOLOL HYDROCHLORIDE 10 MG: 20 TABLET ORAL at 21:21

## 2018-08-31 RX ADMIN — PROPRANOLOL HYDROCHLORIDE 10 MG: 20 TABLET ORAL at 08:25

## 2018-08-31 ASSESSMENT — PAIN DESCRIPTION - LOCATION
LOCATION: HEAD
LOCATION: HEAD

## 2018-08-31 ASSESSMENT — PAIN DESCRIPTION - PAIN TYPE
TYPE: ACUTE PAIN
TYPE: ACUTE PAIN

## 2018-08-31 ASSESSMENT — PAIN DESCRIPTION - DESCRIPTORS
DESCRIPTORS: HEADACHE
DESCRIPTORS: HEADACHE

## 2018-08-31 ASSESSMENT — PAIN SCALES - GENERAL
PAINLEVEL_OUTOF10: 0
PAINLEVEL_OUTOF10: 6

## 2018-08-31 ASSESSMENT — PAIN DESCRIPTION - FREQUENCY
FREQUENCY: CONTINUOUS
FREQUENCY: CONTINUOUS

## 2018-08-31 NOTE — PLAN OF CARE
Problem: Anger Management/Homicidal Ideation:  Goal: Ability to verbalize frustrations and anger appropriately will improve  Ability to verbalize frustrations and anger appropriately will improve   Outcome: Ongoing    Goal: Absence of homicidal ideation  Absence of homicidal ideation   Outcome: Ongoing  Pt denies homicidal thoughts

## 2018-08-31 NOTE — PROGRESS NOTES
Psychiatric Progress Note      Pertinent History & Psychiatric Examination    HPI: Ben Camara is seen in his room today. He continues to isolate himself, still presenting with paranoid delusional thinking and specifically talking about his group home was trying to kill him. He denies any current suicidal or homicidal ideations or plans, also denies any auditory or visual hallucinations, although some circumstantial thinking is evident. His thought processes are restricted. Complaints of Pain: none  Functioning Relationships: good support system      Mental Status Evaluation:  Orientation: alertness: alert   Mood:. dysthymic      Affect:  Restricted      Appearance:  bearded and disheveled   Activity:  Within Normal Limits   Gait/Posture: Normal   Speech:  normal pitch and normal volume   Thought Process:  restricted and circumstantial    Thought Content:  Delusions/PARANOID   Sensorium:  person, place and time/date   Cognition:  grossly intact   Memory: WNL   Insight:  limited   Judgment: limited   Suicidal Ideations: denies suicidal ideation   Homicidal Ideations: Negative for homicidal ideation      Medication Side Effects: absent       Attention Span attention span appeared shorter than expected for age     Clinical Assessment Medical Decision    Axis I: paranoid schizophrenia    Precautions with Justification:   none    Medication Review/Mgmt: Receives Haldol Decanoate 100mg, last on 8/21/18    Medical Issues: History reviewed. No pertinent past medical history. Assessment of Risk for Harm to Self/Others:  Severe paranoia, single,  male    PLAN: Cont. Haldol Decanoate, Lamictal 25mg daily.     Patient's Response to Treatment: positive    Michelle Hooks M.D.  8/31/2018  1:18 PM

## 2018-08-31 NOTE — PLAN OF CARE
Problem: Anger Management/Homicidal Ideation:  Goal: Absence of homicidal ideation  Absence of homicidal ideation   Outcome: Ongoing  Pt did not participate in Social Skills / Benefits of Listening to Music / Positive Coping Skills group at 1500 despite staff encouragement.

## 2018-08-31 NOTE — PLAN OF CARE
Problem: Anger Management/Homicidal Ideation:  Goal: Ability to verbalize frustrations and anger appropriately will improve  Ability to verbalize frustrations and anger appropriately will improve   Outcome: Ongoing  Pt did not attend Community Meeting/Goal Setting skills group at 0900 d/t resting in room despite staff invitation to attend.

## 2018-08-31 NOTE — PLAN OF CARE
Problem: Anger Management/Homicidal Ideation:  Goal: Ability to verbalize frustrations and anger appropriately will improve  Ability to verbalize frustrations and anger appropriately will improve   Outcome: Ongoing  Pt did not attend Creative Expression skills group at 1100 d/t resting in room despite staff invitation to attend.

## 2018-09-01 PROCEDURE — 1240000000 HC EMOTIONAL WELLNESS R&B

## 2018-09-01 PROCEDURE — 6370000000 HC RX 637 (ALT 250 FOR IP): Performed by: PSYCHIATRY & NEUROLOGY

## 2018-09-01 PROCEDURE — 99232 SBSQ HOSP IP/OBS MODERATE 35: CPT | Performed by: PSYCHIATRY & NEUROLOGY

## 2018-09-01 PROCEDURE — 6370000000 HC RX 637 (ALT 250 FOR IP): Performed by: NURSE PRACTITIONER

## 2018-09-01 RX ADMIN — PROPRANOLOL HYDROCHLORIDE 10 MG: 20 TABLET ORAL at 20:31

## 2018-09-01 RX ADMIN — BUSPIRONE HYDROCHLORIDE 15 MG: 15 TABLET ORAL at 14:59

## 2018-09-01 RX ADMIN — BUSPIRONE HYDROCHLORIDE 15 MG: 15 TABLET ORAL at 08:21

## 2018-09-01 RX ADMIN — ACETAMINOPHEN 650 MG: 325 TABLET, FILM COATED ORAL at 18:54

## 2018-09-01 RX ADMIN — BENZTROPINE MESYLATE 1 MG: 1 TABLET ORAL at 08:21

## 2018-09-01 RX ADMIN — BENZTROPINE MESYLATE 1 MG: 1 TABLET ORAL at 20:32

## 2018-09-01 RX ADMIN — BENZTROPINE MESYLATE 1 MG: 1 TABLET ORAL at 14:59

## 2018-09-01 RX ADMIN — LAMOTRIGINE 25 MG: 25 TABLET ORAL at 08:21

## 2018-09-01 RX ADMIN — ZOLPIDEM TARTRATE 10 MG: 10 TABLET, FILM COATED ORAL at 20:32

## 2018-09-01 RX ADMIN — HYDROXYZINE HYDROCHLORIDE 50 MG: 25 TABLET, FILM COATED ORAL at 18:54

## 2018-09-01 RX ADMIN — MULTIPLE VITAMINS W/ MINERALS TAB 1 TABLET: TAB at 08:21

## 2018-09-01 RX ADMIN — BUSPIRONE HYDROCHLORIDE 15 MG: 15 TABLET ORAL at 20:32

## 2018-09-01 ASSESSMENT — PAIN DESCRIPTION - PAIN TYPE: TYPE: CHRONIC PAIN

## 2018-09-01 ASSESSMENT — PAIN SCALES - GENERAL
PAINLEVEL_OUTOF10: 1
PAINLEVEL_OUTOF10: 3
PAINLEVEL_OUTOF10: 1

## 2018-09-01 ASSESSMENT — PAIN DESCRIPTION - ORIENTATION: ORIENTATION: RIGHT

## 2018-09-01 ASSESSMENT — PAIN DESCRIPTION - LOCATION: LOCATION: WRIST

## 2018-09-01 ASSESSMENT — PAIN DESCRIPTION - DESCRIPTORS: DESCRIPTORS: ACHING;DISCOMFORT

## 2018-09-01 ASSESSMENT — PAIN DESCRIPTION - FREQUENCY: FREQUENCY: INTERMITTENT

## 2018-09-01 NOTE — BH NOTE
Psychoeducation Group Note    Date: 09/01  Start Time:  1400 End Time: 1500    Number Participants in Group:  9/21    Goal:  Patient will demonstrate increased interpersonal interaction   Topic: Wellness Group Anxiety/ Daily Affirmations    Discipline Responsible:   OT  AT   X Nsg.  RT  Other       Participation Level:     None  Minimal   X Active Listener X Interactive    Monopolizing         Participation Quality:  X Appropriate  Inappropriate          Attentive        Intrusive          Sharing        Resistant          Supportive        Lethargic       Affective:   X Congruent  Incongruent  Blunted  Flat    Constricted  Anxious  Elated  Angry    Labile  Depressed  Other         Cognitive:  X Alert  Oriented PPTP     Concentration  G X F  P   Attention Span  G X F  P   Short-Term Memory  G X F  P   Long-Term Memory  G X F  P   ProblemSolving/  Decision Making  G X F  P   Ability to Process  Information  G X F  P      Contributing Factors             Delusional             Hallucinating             Flight of Ideas             Other:       Modes of Intervention:  X Education X Support  Exploration    Clarifying  Problem Solving  Confrontation   X Socialization  Limit Setting  Reality Testing   X Activity  Movement  Media    Other:            Response to Learning:  X Able to verbalize current knowledge/experience    Able to verbalize/acknowledge new learning    Able to retain information    Capable of insight    Able to change behavior    Progressing to goal   X Other:        Comments:   Discussed mental and physical aspects of anxiety, and coping skills to deal with anxiety. We then read Daily Affirmations and how they can be used as a coping skill.

## 2018-09-01 NOTE — PLAN OF CARE
Problem: Anger Management/Homicidal Ideation:  Goal: Ability to verbalize frustrations and anger appropriately will improve  Ability to verbalize frustrations and anger appropriately will improve   Outcome: Ongoing  Pt. Is out in dayroom. Pt. Is controlled and cooperative. Pt is guarded and only answers questions posed. Minimal interaction with peers. Pt. Remains with paranoid thinking. Says he cannot go back to his group home and feels the people there are trying to kill him. Pt. Remains safe on the unit. Q 15 minute checks for safety maintained. Goal: Absence of homicidal ideation  Absence of homicidal ideation   Outcome: Ongoing  Pt. Denies suicidal or homicidal thoughts. Pt. Remains safe on the unit. Q 15 minute checks for safety maintained.

## 2018-09-02 PROCEDURE — 1240000000 HC EMOTIONAL WELLNESS R&B

## 2018-09-02 PROCEDURE — 6370000000 HC RX 637 (ALT 250 FOR IP): Performed by: NURSE PRACTITIONER

## 2018-09-02 PROCEDURE — 6370000000 HC RX 637 (ALT 250 FOR IP): Performed by: PSYCHIATRY & NEUROLOGY

## 2018-09-02 RX ADMIN — PROPRANOLOL HYDROCHLORIDE 10 MG: 20 TABLET ORAL at 20:32

## 2018-09-02 RX ADMIN — ACETAMINOPHEN 650 MG: 325 TABLET, FILM COATED ORAL at 19:20

## 2018-09-02 RX ADMIN — BENZTROPINE MESYLATE 1 MG: 1 TABLET ORAL at 08:14

## 2018-09-02 RX ADMIN — LAMOTRIGINE 25 MG: 25 TABLET ORAL at 08:15

## 2018-09-02 RX ADMIN — BENZTROPINE MESYLATE 1 MG: 1 TABLET ORAL at 14:20

## 2018-09-02 RX ADMIN — MULTIPLE VITAMINS W/ MINERALS TAB 1 TABLET: TAB at 08:15

## 2018-09-02 RX ADMIN — ZOLPIDEM TARTRATE 10 MG: 10 TABLET, FILM COATED ORAL at 20:33

## 2018-09-02 RX ADMIN — BUSPIRONE HYDROCHLORIDE 15 MG: 15 TABLET ORAL at 08:15

## 2018-09-02 RX ADMIN — BUSPIRONE HYDROCHLORIDE 15 MG: 15 TABLET ORAL at 20:32

## 2018-09-02 RX ADMIN — HYDROXYZINE HYDROCHLORIDE 50 MG: 25 TABLET, FILM COATED ORAL at 20:33

## 2018-09-02 RX ADMIN — BENZTROPINE MESYLATE 1 MG: 1 TABLET ORAL at 20:32

## 2018-09-02 RX ADMIN — PROPRANOLOL HYDROCHLORIDE 10 MG: 20 TABLET ORAL at 08:14

## 2018-09-02 RX ADMIN — BUSPIRONE HYDROCHLORIDE 15 MG: 15 TABLET ORAL at 14:20

## 2018-09-02 ASSESSMENT — PAIN SCALES - GENERAL
PAINLEVEL_OUTOF10: 0
PAINLEVEL_OUTOF10: 1

## 2018-09-02 ASSESSMENT — PAIN DESCRIPTION - PAIN TYPE: TYPE: CHRONIC PAIN

## 2018-09-02 ASSESSMENT — PAIN DESCRIPTION - LOCATION: LOCATION: WRIST

## 2018-09-02 ASSESSMENT — PAIN DESCRIPTION - ORIENTATION: ORIENTATION: RIGHT

## 2018-09-03 PROCEDURE — 6360000002 HC RX W HCPCS: Performed by: PSYCHIATRY & NEUROLOGY

## 2018-09-03 PROCEDURE — 6370000000 HC RX 637 (ALT 250 FOR IP): Performed by: PSYCHIATRY & NEUROLOGY

## 2018-09-03 PROCEDURE — 6360000002 HC RX W HCPCS

## 2018-09-03 PROCEDURE — 1240000000 HC EMOTIONAL WELLNESS R&B

## 2018-09-03 PROCEDURE — 6370000000 HC RX 637 (ALT 250 FOR IP): Performed by: NURSE PRACTITIONER

## 2018-09-03 PROCEDURE — 99232 SBSQ HOSP IP/OBS MODERATE 35: CPT | Performed by: PSYCHIATRY & NEUROLOGY

## 2018-09-03 RX ORDER — DIPHENHYDRAMINE HYDROCHLORIDE 50 MG/ML
INJECTION INTRAMUSCULAR; INTRAVENOUS
Status: COMPLETED
Start: 2018-09-03 | End: 2018-09-03

## 2018-09-03 RX ORDER — DIPHENHYDRAMINE HYDROCHLORIDE 50 MG/ML
50 INJECTION INTRAMUSCULAR; INTRAVENOUS EVERY 6 HOURS PRN
Status: DISCONTINUED | OUTPATIENT
Start: 2018-09-03 | End: 2018-09-13 | Stop reason: HOSPADM

## 2018-09-03 RX ADMIN — HYDROXYZINE HYDROCHLORIDE 50 MG: 25 TABLET, FILM COATED ORAL at 08:24

## 2018-09-03 RX ADMIN — DIPHENHYDRAMINE HYDROCHLORIDE 50 MG: 50 INJECTION INTRAMUSCULAR; INTRAVENOUS at 11:10

## 2018-09-03 RX ADMIN — MULTIPLE VITAMINS W/ MINERALS TAB 1 TABLET: TAB at 08:24

## 2018-09-03 RX ADMIN — BENZTROPINE MESYLATE 1 MG: 1 TABLET ORAL at 20:04

## 2018-09-03 RX ADMIN — BENZTROPINE MESYLATE 1 MG: 1 TABLET ORAL at 08:24

## 2018-09-03 RX ADMIN — LAMOTRIGINE 25 MG: 25 TABLET ORAL at 08:24

## 2018-09-03 RX ADMIN — BUSPIRONE HYDROCHLORIDE 15 MG: 15 TABLET ORAL at 20:05

## 2018-09-03 RX ADMIN — BUSPIRONE HYDROCHLORIDE 15 MG: 15 TABLET ORAL at 08:24

## 2018-09-03 RX ADMIN — HYDROXYZINE HYDROCHLORIDE 50 MG: 25 TABLET, FILM COATED ORAL at 18:57

## 2018-09-03 RX ADMIN — PROPRANOLOL HYDROCHLORIDE 10 MG: 20 TABLET ORAL at 20:04

## 2018-09-03 RX ADMIN — ACETAMINOPHEN 650 MG: 325 TABLET, FILM COATED ORAL at 18:57

## 2018-09-03 RX ADMIN — DIPHENHYDRAMINE HYDROCHLORIDE 50 MG: 50 INJECTION, SOLUTION INTRAMUSCULAR; INTRAVENOUS at 11:10

## 2018-09-03 RX ADMIN — ZOLPIDEM TARTRATE 10 MG: 10 TABLET, FILM COATED ORAL at 20:05

## 2018-09-03 RX ADMIN — HALOPERIDOL LACTATE 5 MG: 5 INJECTION, SOLUTION INTRAMUSCULAR at 11:09

## 2018-09-03 RX ADMIN — PROPRANOLOL HYDROCHLORIDE 10 MG: 20 TABLET ORAL at 08:24

## 2018-09-03 ASSESSMENT — PAIN SCALES - GENERAL
PAINLEVEL_OUTOF10: 0
PAINLEVEL_OUTOF10: 6

## 2018-09-03 NOTE — PLAN OF CARE
Problem: Anger Management/Homicidal Ideation:  Intervention: Encourage to identify a list of anger triggers and processes to understand cues  Ongoing  Intervention: Assess coping skills and behavior  Assessment performed. Goal: Ability to verbalize frustrations and anger appropriately will improve  Ability to verbalize frustrations and anger appropriately will improve   Outcome: Ongoing  Patient denies all, states that he is ready for discharge once housing has been secured, feels safe in current environment, will utilize staff as needed and for 1:1. Pt. Is calm, cooperative and pleasant, is medication compliant, eats in the dining room and is seclusive to room. Pt. Was flat, guarded with poor eye contact during interview with writer. Pt. Was anxious AEB pacing in the hallway. Writer encouraged patient to attend groups to order to learn coping and life skills. Pt. Reported good sleep and good appetite. Q15 minute safety checks performed and pt. Safety maintained.   Goal: Absence of homicidal ideation  Absence of homicidal ideation   Outcome: Met This Shift      Problem: Tobacco Use:  Intervention: Tobacco-use cessation counseling  Ongoing    Goal: Inpatient tobacco use cessation counseling participation  Inpatient tobacco use cessation counseling participation   Outcome: Ongoing

## 2018-09-03 NOTE — PLAN OF CARE
Problem: Anger Management/Homicidal Ideation:  Goal: Absence of homicidal ideation  Absence of homicidal ideation   Outcome: Ongoing  Pt did not participate in Goal Setting / [employer] group at 1980 despite staff encouragement.

## 2018-09-04 PROCEDURE — 1240000000 HC EMOTIONAL WELLNESS R&B

## 2018-09-04 PROCEDURE — 6370000000 HC RX 637 (ALT 250 FOR IP): Performed by: PSYCHIATRY & NEUROLOGY

## 2018-09-04 PROCEDURE — 6370000000 HC RX 637 (ALT 250 FOR IP): Performed by: NURSE PRACTITIONER

## 2018-09-04 PROCEDURE — 99232 SBSQ HOSP IP/OBS MODERATE 35: CPT | Performed by: PSYCHIATRY & NEUROLOGY

## 2018-09-04 RX ADMIN — BUSPIRONE HYDROCHLORIDE 15 MG: 15 TABLET ORAL at 13:04

## 2018-09-04 RX ADMIN — LAMOTRIGINE 25 MG: 25 TABLET ORAL at 13:04

## 2018-09-04 RX ADMIN — BUSPIRONE HYDROCHLORIDE 15 MG: 15 TABLET ORAL at 20:59

## 2018-09-04 RX ADMIN — ZOLPIDEM TARTRATE 10 MG: 10 TABLET, FILM COATED ORAL at 20:59

## 2018-09-04 RX ADMIN — HYDROXYZINE HYDROCHLORIDE 50 MG: 25 TABLET, FILM COATED ORAL at 20:59

## 2018-09-04 RX ADMIN — BENZTROPINE MESYLATE 1 MG: 1 TABLET ORAL at 20:59

## 2018-09-04 RX ADMIN — PROPRANOLOL HYDROCHLORIDE 10 MG: 20 TABLET ORAL at 13:04

## 2018-09-04 RX ADMIN — PROPRANOLOL HYDROCHLORIDE 10 MG: 20 TABLET ORAL at 09:08

## 2018-09-04 RX ADMIN — BENZTROPINE MESYLATE 1 MG: 1 TABLET ORAL at 13:08

## 2018-09-04 RX ADMIN — PROPRANOLOL HYDROCHLORIDE 10 MG: 20 TABLET ORAL at 21:00

## 2018-09-04 RX ADMIN — MULTIPLE VITAMINS W/ MINERALS TAB 1 TABLET: TAB at 13:03

## 2018-09-04 ASSESSMENT — PAIN DESCRIPTION - PAIN TYPE: TYPE: ACUTE PAIN

## 2018-09-04 ASSESSMENT — PAIN DESCRIPTION - LOCATION: LOCATION: WRIST

## 2018-09-04 ASSESSMENT — PAIN SCALES - GENERAL: PAINLEVEL_OUTOF10: 6

## 2018-09-04 ASSESSMENT — PAIN DESCRIPTION - ORIENTATION: ORIENTATION: RIGHT

## 2018-09-04 NOTE — PLAN OF CARE
Problem: Anger Management/Homicidal Ideation:  Goal: Absence of homicidal ideation  Absence of homicidal ideation   Outcome: Ongoing  Pt denies any SI or HI and no harming behaviors noted. Quiet and preoccupied most of the time. Makes requests.

## 2018-09-04 NOTE — PLAN OF CARE
Problem: Anger Management/Homicidal Ideation:  Goal: Absence of homicidal ideation  Absence of homicidal ideation   Outcome: Ongoing  Pt did not participate in Positive Thinking / Improving Self Esteem / Changing Thought Process group at 1330 despite staff encouragement.

## 2018-09-04 NOTE — PLAN OF CARE
Problem: Anger Management/Homicidal Ideation:  Goal: Absence of homicidal ideation  Absence of homicidal ideation   Outcome: Ongoing  Pt denies thoughts of harming others and is agreeable to seeking out should thoughts of harming others arise. Safe environment maintained. Q15 minute checks for safety cont per unit policy. Will cont to monitor for safety and provides support and reassurance as needed.

## 2018-09-04 NOTE — PLAN OF CARE
Content:Normal: No  Thought Content: Delusions, Preoccupations  Hallucinations: None  Delusions: Yes  Delusions: Persecution  Memory:Normal: No  Memory: Poor Recent, Poor Remote  Insight and Judgment: No  Insight and Judgment: Poor Judgment, Poor Insight  Present Suicidal Ideation: No  Present Homicidal Ideation: No    Daily Assessment Last Entry:   Daily Sleep (WDL): Within Defined Limits         Patient Currently in Pain: Yes  Daily Nutrition (WDL): Within Defined Limits    Patient Monitoring:  Frequency of Checks: 4 times per hour, close    Psychiatric Symptoms:   Depression Symptoms  Depression Symptoms: Change in energy level, Feelings of helplessness, Feelings of hopelessess, Impaired concentration, Loss of interest, Isolative  Anxiety Symptoms  Anxiety Symptoms: Generalized  Deana Symptoms  Deana Symptoms: No problems reported or observed. Psychosis Symptoms  Delusion Type: No problems reported or observed. Suicide Risk CSSR-S:  1) Within the past month, have you wished you were dead or wished you could go to sleep and not wake up? : NO  2) Within the past month, have you actually had any thoughts of killing yourself? : NO  6)  Have you ever done anything, started to do anything, or prepared to do anything to end your life?: NO  Change in result:  No  Change in plan of care:  No    EDUCATION:   Learner Progress Toward Treatment Goals:   Reviewed results and recommendations of this team, reviewed group plan and strategies, reviewed signs, symptoms and risk of self harm and violent behavior, reviewed goals and plan of care. Method:  individual education, small group, verbal education. Outcome:  Patient refused to participate in treatment team meeting. Patient needs reinforcement to obtain goals. PATIENT GOALS:  Short term:  Patient refused to participate in treatment team meeting. Long term:   Patient refused to participate in treatment team meeting.     PLAN/TREATMENT RECOMMENDATIONS UPDATE:

## 2018-09-04 NOTE — PLAN OF CARE
Problem: Anger Management/Homicidal Ideation:  Goal: Ability to verbalize frustrations and anger appropriately will improve  Ability to verbalize frustrations and anger appropriately will improve   Outcome: Ongoing  Pt declined to attend psychotherapy at 1000 am despite encouragement. Pt offered 1:1 and refused.

## 2018-09-04 NOTE — PLAN OF CARE
Problem: Anger Management/Homicidal Ideation:  Goal: Ability to verbalize frustrations and anger appropriately will improve  Ability to verbalize frustrations and anger appropriately will improve   Outcome: Ongoing  Pt states that he had a bad morning but wants to have a better evening. Pt was accepting of medications offered. Pt states he is feeling more relaxed but is still anxious. Cooperative with staff and on unit.

## 2018-09-04 NOTE — PLAN OF CARE
Problem: Anger Management/Homicidal Ideation:  Goal: Ability to verbalize frustrations and anger appropriately will improve  Ability to verbalize frustrations and anger appropriately will improve   Outcome: Ongoing  Pt did not participate in Therapeutic Leisure Skills Group at 1100 despite staff encouragement.

## 2018-09-05 PROCEDURE — 6370000000 HC RX 637 (ALT 250 FOR IP): Performed by: PSYCHIATRY & NEUROLOGY

## 2018-09-05 PROCEDURE — 6370000000 HC RX 637 (ALT 250 FOR IP): Performed by: NURSE PRACTITIONER

## 2018-09-05 PROCEDURE — 99232 SBSQ HOSP IP/OBS MODERATE 35: CPT | Performed by: PSYCHIATRY & NEUROLOGY

## 2018-09-05 PROCEDURE — 1240000000 HC EMOTIONAL WELLNESS R&B

## 2018-09-05 RX ORDER — ARIPIPRAZOLE 5 MG/1
5 TABLET ORAL DAILY
Status: DISCONTINUED | OUTPATIENT
Start: 2018-09-05 | End: 2018-09-06

## 2018-09-05 RX ADMIN — HYDROXYZINE HYDROCHLORIDE 50 MG: 25 TABLET, FILM COATED ORAL at 08:26

## 2018-09-05 RX ADMIN — HYDROXYZINE HYDROCHLORIDE 50 MG: 25 TABLET, FILM COATED ORAL at 20:29

## 2018-09-05 RX ADMIN — LAMOTRIGINE 25 MG: 25 TABLET ORAL at 08:26

## 2018-09-05 RX ADMIN — BUSPIRONE HYDROCHLORIDE 15 MG: 15 TABLET ORAL at 20:29

## 2018-09-05 RX ADMIN — BENZTROPINE MESYLATE 1 MG: 1 TABLET ORAL at 14:19

## 2018-09-05 RX ADMIN — ARIPIPRAZOLE 5 MG: 5 TABLET ORAL at 14:19

## 2018-09-05 RX ADMIN — BENZTROPINE MESYLATE 1 MG: 1 TABLET ORAL at 08:26

## 2018-09-05 RX ADMIN — PROPRANOLOL HYDROCHLORIDE 10 MG: 20 TABLET ORAL at 08:26

## 2018-09-05 RX ADMIN — BUSPIRONE HYDROCHLORIDE 15 MG: 15 TABLET ORAL at 14:19

## 2018-09-05 RX ADMIN — PROPRANOLOL HYDROCHLORIDE 10 MG: 20 TABLET ORAL at 20:29

## 2018-09-05 RX ADMIN — MULTIPLE VITAMINS W/ MINERALS TAB 1 TABLET: TAB at 08:26

## 2018-09-05 RX ADMIN — ZOLPIDEM TARTRATE 10 MG: 10 TABLET, FILM COATED ORAL at 20:29

## 2018-09-05 RX ADMIN — BUSPIRONE HYDROCHLORIDE 15 MG: 15 TABLET ORAL at 08:26

## 2018-09-05 RX ADMIN — BENZTROPINE MESYLATE 1 MG: 1 TABLET ORAL at 20:29

## 2018-09-05 NOTE — PLAN OF CARE
Problem: Anger Management/Homicidal Ideation:  Goal: Absence of homicidal ideation  Absence of homicidal ideation   Outcome: Ongoing  Pt denies thoughts of self harm and harm to others is agreeable to seeking out should thoughts of self harm or homicidal thoughts should arise. Safe environment maintained. Q15 minute checks for safety cont per unit policy. Will cont to monitor for safety and provides support and reassurance as needed.

## 2018-09-05 NOTE — PLAN OF CARE
Problem: Anger Management/Homicidal Ideation:  Goal: Absence of homicidal ideation  Absence of homicidal ideation   Outcome: Ongoing  Pt did not participate in community meeting/ goals group at 0900 despite staff encouragement to attend.

## 2018-09-05 NOTE — PLAN OF CARE
Problem: Anger Management/Homicidal Ideation:  Goal: Absence of homicidal ideation  Absence of homicidal ideation   Outcome: Ongoing  Pt did not participate in Signs + Symptoms of MH Diagnosis / Mental Health Awareness / Social Skills group at 1330 despite staff encouragement.

## 2018-09-05 NOTE — PROGRESS NOTES
Psychiatric Progress Note      Pertinent History & Psychiatric Examination    HPI: Devika Flores is seen in the day area. He continues to isolate himself, still presenting with paranoid delusional thinking and specifically talking about about our nursing staff, talking about him, . He also doesn't think he has a thought disorder, reports that his PTSD has been causing him flashbacks. Doesn't want to stay here but also doesn't want to go to shelter. Social work trying to facilitate. Discussed alternative medications, talked about the pros called side effects and interactions of Abilify. Due to his tendency to see our staff as adversaries, he could potentially be a danger to others. Complaints of Pain: none  Functioning Relationships: good support system      Mental Status Evaluation:  Orientation: alertness: alert   Mood:. dysthymic      Affect:  Restricted      Appearance:  bearded and disheveled   Activity:  Within Normal Limits   Gait/Posture: Normal   Speech:  normal pitch and normal volume   Thought Process:  restricted and circumstantial    Thought Content:  Delusions/PARANOID   Sensorium:  person, place and time/date   Cognition:  grossly intact   Memory: WNL   Insight:  limited   Judgment: limited   Suicidal Ideations: denies suicidal ideation   Homicidal Ideations: Negative for homicidal ideation      Medication Side Effects: absent       Attention Span attention span appeared shorter than expected for age     Clinical Assessment Medical Decision    Axis I: paranoid schizophrenia    Precautions with Justification:   none    Medication Review/Mgmt: Receives Haldol Decanoate 100mg, last on 8/21/18    Medical Issues: History reviewed. No pertinent past medical history. Assessment of Risk for Harm to Self/Others:  Severe paranoia, single,  male    PLAN: Cont. Haldol Decanoate, Lamictal 25mg daily. And add abilify with a plan of adding a long acting injectable to replace haldol.     Patient's Response to Treatment: positive. We discussed the pros called side effects and interactions of abilify. Initially agreed to start it at 5 mg a day and titrate for affect.     Lindsey Mcgee M.D.  9/5/2018  1:39 PM

## 2018-09-05 NOTE — PLAN OF CARE
Problem: Anger Management/Homicidal Ideation:  Goal: Absence of homicidal ideation  Absence of homicidal ideation   Outcome: Ongoing  PSYCHOTHERAPY GROUP NOTE    Date: 9/5/18  Start Time: 10 AM  End Time: 1045AM    Number Participants in Group:  7    Goal:  Patient will demonstrate increased interpersonal interaction   Topic: Support     Discipline Responsible:   OT  AT x SW  Nsg.  RT MHP Other       Participation Level:     None  Minimal   x Active Listener  Interactive    Monopolizing         Participation Quality:  x Appropriate  Inappropriate   x       Attentive        Intrusive          Sharing        Resistant          Supportive        Lethargic       Affective:   x Congruent  Incongruent  Blunted  Flat    Constricted  Anxious  Elated  Angry    Labile  Depressed  Other         Cognitive:  x Alert x Oriented PPTP     Concentration  G x F  P   Attention Span  G x F  P   Short-Term Memory  G x F  P   Long-Term Memory  G x F  P   ProblemSolving/  Decision Making  G x F  P   Ability to Process  Information  G x F  P      Contributing Factors             Delusional             Hallucinating             Flight of Ideas             Other:       Modes of Intervention:   Education x Support  Exploration    Clarifying  Problem Solving  Confrontation   x Socialization  Limit Setting  Reality Testing    Activity  Movement  Media    Other:            Response to Learning:  x Able to verbalize current knowledge/experience    Able to verbalize/acknowledge new learning    Able to retain information    Capable of insight    Able to change behavior    Progressing to goal    Other:        Comments: Attempted to identify forms of effective communication

## 2018-09-06 LAB
AMPHETAMINE SCREEN URINE: NEGATIVE
BARBITURATE SCREEN URINE: NEGATIVE
BENZODIAZEPINE SCREEN, URINE: NEGATIVE
BUPRENORPHINE URINE: NORMAL
CANNABINOID SCREEN URINE: NEGATIVE
COCAINE METABOLITE, URINE: NEGATIVE
MDMA URINE: NORMAL
METHADONE SCREEN, URINE: NEGATIVE
METHAMPHETAMINE, URINE: NORMAL
OPIATES, URINE: NEGATIVE
OXYCODONE SCREEN URINE: NEGATIVE
PHENCYCLIDINE, URINE: NEGATIVE
PROPOXYPHENE, URINE: NORMAL
TEST INFORMATION: NORMAL
TRICYCLIC ANTIDEPRESSANTS, UR: NORMAL

## 2018-09-06 PROCEDURE — 80307 DRUG TEST PRSMV CHEM ANLYZR: CPT

## 2018-09-06 PROCEDURE — 6370000000 HC RX 637 (ALT 250 FOR IP): Performed by: PSYCHIATRY & NEUROLOGY

## 2018-09-06 PROCEDURE — 1240000000 HC EMOTIONAL WELLNESS R&B

## 2018-09-06 PROCEDURE — 6370000000 HC RX 637 (ALT 250 FOR IP): Performed by: NURSE PRACTITIONER

## 2018-09-06 PROCEDURE — 99232 SBSQ HOSP IP/OBS MODERATE 35: CPT | Performed by: PSYCHIATRY & NEUROLOGY

## 2018-09-06 RX ORDER — BENZTROPINE MESYLATE 0.5 MG/1
0.5 TABLET ORAL 2 TIMES DAILY PRN
Status: DISCONTINUED | OUTPATIENT
Start: 2018-09-06 | End: 2018-09-13 | Stop reason: HOSPADM

## 2018-09-06 RX ORDER — ARIPIPRAZOLE 10 MG/1
10 TABLET ORAL DAILY
Status: DISCONTINUED | OUTPATIENT
Start: 2018-09-07 | End: 2018-09-13 | Stop reason: HOSPADM

## 2018-09-06 RX ADMIN — HYDROXYZINE HYDROCHLORIDE 50 MG: 25 TABLET, FILM COATED ORAL at 20:13

## 2018-09-06 RX ADMIN — BENZTROPINE MESYLATE 1 MG: 1 TABLET ORAL at 08:29

## 2018-09-06 RX ADMIN — MULTIPLE VITAMINS W/ MINERALS TAB 1 TABLET: TAB at 08:28

## 2018-09-06 RX ADMIN — LAMOTRIGINE 25 MG: 25 TABLET ORAL at 08:28

## 2018-09-06 RX ADMIN — ARIPIPRAZOLE 5 MG: 5 TABLET ORAL at 08:29

## 2018-09-06 RX ADMIN — BUSPIRONE HYDROCHLORIDE 15 MG: 15 TABLET ORAL at 08:28

## 2018-09-06 RX ADMIN — BUSPIRONE HYDROCHLORIDE 15 MG: 15 TABLET ORAL at 13:58

## 2018-09-06 RX ADMIN — BUSPIRONE HYDROCHLORIDE 15 MG: 15 TABLET ORAL at 20:12

## 2018-09-06 RX ADMIN — HYDROXYZINE HYDROCHLORIDE 50 MG: 25 TABLET, FILM COATED ORAL at 15:47

## 2018-09-06 RX ADMIN — BENZTROPINE MESYLATE 1 MG: 1 TABLET ORAL at 13:58

## 2018-09-06 RX ADMIN — PROPRANOLOL HYDROCHLORIDE 10 MG: 20 TABLET ORAL at 08:28

## 2018-09-06 RX ADMIN — ZOLPIDEM TARTRATE 10 MG: 10 TABLET, FILM COATED ORAL at 20:12

## 2018-09-06 RX ADMIN — PROPRANOLOL HYDROCHLORIDE 10 MG: 20 TABLET ORAL at 20:12

## 2018-09-06 NOTE — CARE COORDINATION
Psychotherapy Group Note    Date:09/06/2018  Start Time: 11:00AM  End Time: 11:45AM    Number Participants in Group:  13/24    Goal:  Patient will demonstrate increased interpersonal interaction   Topic: [place] [facility]    Discipline Responsible:   OT  AT x SW  Nsg.  RT  Other       Participation Level:     None  Minimal   x Active Listener x Interactive    Monopolizing         Participation Quality:   Appropriate  Inappropriate   x       Attentive        Intrusive   x       Sharing        Resistant   x       Supportive        Lethargic       Affective:   x Congruent  Incongruent  Blunted  Flat    Constricted  Anxious  Elated  Angry    Labile  Depressed  Other         Cognitive:  x Alert x Oriented PPTP     Concentration  G  F x P   Attention Span  G  F x P   Short-Term Memory  G x F  P   Long-Term Memory  G x F  P   Problem Solving/  Decision Making  G x F  P   Ability to Process  Information  G x F  P      Contributing Factors             Delusional             Hallucinating             Flight of Ideas             Other:       Modes of Intervention:  x Education x Support x Exploration   x Clarifying x Problem Solving x Confrontation   x Socialization x Limit Setting x Reality Testing    Activity  Movement  Media    Other:            Response to Learning:   Able to verbalize current knowledge/experience    Able to verbalize/acknowledge new learning    Able to retain information    Capable of insight    Able to change behavior   x Progressing to goal    Other:        Comments:

## 2018-09-06 NOTE — PLAN OF CARE
Problem: Anger Management/Homicidal Ideation:  Goal: Ability to verbalize frustrations and anger appropriately will improve  Ability to verbalize frustrations and anger appropriately will improve   Outcome: Ongoing  No angry outburst noted this am  Patient pleasant on approach took am medications  Attends select group programming interacts with staff appropriately social with select peers on the unit  q15 minute visual safety checks continue per unit protocol

## 2018-09-07 PROCEDURE — 1240000000 HC EMOTIONAL WELLNESS R&B

## 2018-09-07 PROCEDURE — 6370000000 HC RX 637 (ALT 250 FOR IP): Performed by: PSYCHIATRY & NEUROLOGY

## 2018-09-07 PROCEDURE — 99232 SBSQ HOSP IP/OBS MODERATE 35: CPT | Performed by: PSYCHIATRY & NEUROLOGY

## 2018-09-07 RX ORDER — PROPRANOLOL HYDROCHLORIDE 20 MG/1
10 TABLET ORAL 3 TIMES DAILY
Status: DISCONTINUED | OUTPATIENT
Start: 2018-09-07 | End: 2018-09-13 | Stop reason: HOSPADM

## 2018-09-07 RX ADMIN — HYDROXYZINE HYDROCHLORIDE 50 MG: 25 TABLET, FILM COATED ORAL at 07:58

## 2018-09-07 RX ADMIN — PROPRANOLOL HYDROCHLORIDE 10 MG: 20 TABLET ORAL at 07:59

## 2018-09-07 RX ADMIN — BUSPIRONE HYDROCHLORIDE 15 MG: 15 TABLET ORAL at 14:24

## 2018-09-07 RX ADMIN — MULTIPLE VITAMINS W/ MINERALS TAB 1 TABLET: TAB at 07:58

## 2018-09-07 RX ADMIN — ACETAMINOPHEN 650 MG: 325 TABLET, FILM COATED ORAL at 09:49

## 2018-09-07 RX ADMIN — PROPRANOLOL HYDROCHLORIDE 10 MG: 20 TABLET ORAL at 20:33

## 2018-09-07 RX ADMIN — ZOLPIDEM TARTRATE 10 MG: 10 TABLET, FILM COATED ORAL at 20:32

## 2018-09-07 RX ADMIN — ARIPIPRAZOLE 10 MG: 10 TABLET ORAL at 07:59

## 2018-09-07 RX ADMIN — BUSPIRONE HYDROCHLORIDE 15 MG: 15 TABLET ORAL at 20:32

## 2018-09-07 RX ADMIN — BUSPIRONE HYDROCHLORIDE 15 MG: 15 TABLET ORAL at 07:59

## 2018-09-07 RX ADMIN — PROPRANOLOL HYDROCHLORIDE 10 MG: 20 TABLET ORAL at 14:23

## 2018-09-07 ASSESSMENT — PAIN DESCRIPTION - FREQUENCY: FREQUENCY: INTERMITTENT

## 2018-09-07 ASSESSMENT — PAIN SCALES - GENERAL
PAINLEVEL_OUTOF10: 1
PAINLEVEL_OUTOF10: 3

## 2018-09-07 ASSESSMENT — PAIN DESCRIPTION - PAIN TYPE: TYPE: ACUTE PAIN

## 2018-09-07 ASSESSMENT — PAIN DESCRIPTION - DESCRIPTORS: DESCRIPTORS: HEADACHE

## 2018-09-07 ASSESSMENT — PAIN DESCRIPTION - LOCATION: LOCATION: HEAD

## 2018-09-07 NOTE — PLAN OF CARE
Problem: Anger Management/Homicidal Ideation:  Goal: Ability to verbalize frustrations and anger appropriately will improve  Ability to verbalize frustrations and anger appropriately will improve   Outcome: Ongoing  Pt did not attend Community Meeting/Goal Setting skills group at [address] [place] [address] d resting in room despite staff invitation to attend.

## 2018-09-07 NOTE — PROGRESS NOTES
Psychiatric Progress Note      Pertinent History & Psychiatric Examination    HPI: [place] Lugo is seen in the day area. He is less isolative today and participation in groups has improved, although he is still presenting with less paranoid delusional thinking( the staff reported a decrease in intensity) . He also doesn't think he has a thought disorder, reports that his PTSD has been causing him flashbacks. Doesn't want to stay here but also doesn't want to go to shelter. Social work trying to facilitate. Tolerated oral/IM Abilify pretty well. Complaints of Pain: none  Functioning Relationships: good support system      Mental Status Evaluation:  Orientation: alertness: alert   Mood:. dysthymic      Affect:  More open       Appearance:  bearded and disheveled   Activity:  Within Normal Limits   Gait/Posture: Normal   Speech:  normal pitch and normal volume   Thought Process:  circumstantial    Thought Content:  Delusions/PARANOID   Sensorium:  person, place and time/date   Cognition:  grossly intact   Memory: WNL   Insight:  limited   Judgment: limited   Suicidal Ideations: denies suicidal ideation   Homicidal Ideations: Negative for homicidal ideation      Medication Side Effects: absent       Attention Span attention span appeared shorter than expected for age     Clinical Assessment Medical Decision    Axis I: paranoid schizophrenia    Precautions with Justification:   none    Medication Review/Mgmt: Receives Haldol Decanoate 100mg, last on 8/21/18    Medical Issues: History reviewed. No pertinent past medical history. Assessment of Risk for Harm to Self/Others:  Severe paranoia, single,  male    PLAN: Added a long acting injectable aristada    Patient's Response to Treatment: positive. We discussed the pros called side effects and interactions of abilify. Agreed on oral abilify 10 mg a day and will titrate for affect.     Asha Wright M.D.  9/7/2018  12:57 PM

## 2018-09-07 NOTE — PLAN OF CARE
Problem: Anger Management/Homicidal Ideation:  Goal: Ability to verbalize frustrations and anger appropriately will improve  Ability to verbalize frustrations and anger appropriately will improve   Outcome: Ongoing    Goal: Absence of homicidal ideation  Absence of homicidal ideation   Outcome: Ongoing  Patient denies homicidal thoughts this shift but remains focused the [employer] and his thoughts that they are following him. Pt continues with the idea that he is a informant for the [employer].

## 2018-09-07 NOTE — PLAN OF CARE
Problem: Anger Management/Homicidal Ideation:  Goal: Ability to verbalize frustrations and anger appropriately will improve  Ability to verbalize frustrations and anger appropriately will improve   Outcome: Ongoing  Pt verbalized frustrations appropriately this evening. No issues noted.

## 2018-09-07 NOTE — PLAN OF CARE
Problem: Anger Management/Homicidal Ideation:  Goal: Ability to verbalize frustrations and anger appropriately will improve  Ability to verbalize frustrations and anger appropriately will improve   Outcome: Ongoing  PSYCHOTHERAPY GROUP NOTE    Date: 9/7/18  Start Time: 10 am  End Time: 1055am    Number Participants in Group:  6    Goal:  Patient will demonstrate increased interpersonal interaction   Topic: Support     Discipline Responsible:   OT  AT xx SW  Nsg.  RT MHP Other       Participation Level:     None  Minimal    Active Listener  Interactive    Monopolizing         Participation Quality:  x Appropriate  Inappropriate   x       Attentive        Intrusive   x       Sharing        Resistant   x       Supportive        Lethargic       Affective:   x Congruent  Incongruent  Blunted  Flat    Constricted  Anxious  Elated  Angry    Labile  Depressed  Other         Cognitive:  x Alert x Oriented PPTP     Concentration  G x F  P   Attention Span  G x F  P   Short-Term Memory  G x F  P   Long-Term Memory  G x F  P   ProblemSolving/  Decision Making  G x F  P   Ability to Process  Information  G x F  P      Contributing Factors             Delusional             Hallucinating             Flight of Ideas             Other:       Modes of Intervention:   Education x Support  Exploration    Clarifying  Problem Solving  Confrontation    Socialization  Limit Setting  Reality Testing    Activity  Movement  Media    Other:            Response to Learning:  x Able to verbalize current knowledge/experience    Able to verbalize/acknowledge new learning   x Able to retain information    Capable of insight    Able to change behavior    Progressing to goal    Other:        Comments:

## 2018-09-08 PROCEDURE — 99232 SBSQ HOSP IP/OBS MODERATE 35: CPT | Performed by: PSYCHIATRY & NEUROLOGY

## 2018-09-08 PROCEDURE — 6370000000 HC RX 637 (ALT 250 FOR IP): Performed by: PSYCHIATRY & NEUROLOGY

## 2018-09-08 PROCEDURE — 1240000000 HC EMOTIONAL WELLNESS R&B

## 2018-09-08 RX ADMIN — PROPRANOLOL HYDROCHLORIDE 10 MG: 20 TABLET ORAL at 14:34

## 2018-09-08 RX ADMIN — BUSPIRONE HYDROCHLORIDE 15 MG: 15 TABLET ORAL at 08:29

## 2018-09-08 RX ADMIN — ACETAMINOPHEN 650 MG: 325 TABLET, FILM COATED ORAL at 20:26

## 2018-09-08 RX ADMIN — MULTIPLE VITAMINS W/ MINERALS TAB 1 TABLET: TAB at 08:30

## 2018-09-08 RX ADMIN — ARIPIPRAZOLE 10 MG: 10 TABLET ORAL at 08:29

## 2018-09-08 RX ADMIN — HYDROXYZINE HYDROCHLORIDE 50 MG: 25 TABLET, FILM COATED ORAL at 18:04

## 2018-09-08 RX ADMIN — ZOLPIDEM TARTRATE 10 MG: 10 TABLET, FILM COATED ORAL at 20:26

## 2018-09-08 RX ADMIN — BUSPIRONE HYDROCHLORIDE 15 MG: 15 TABLET ORAL at 20:26

## 2018-09-08 RX ADMIN — BUSPIRONE HYDROCHLORIDE 15 MG: 15 TABLET ORAL at 14:33

## 2018-09-08 RX ADMIN — PROPRANOLOL HYDROCHLORIDE 10 MG: 20 TABLET ORAL at 20:26

## 2018-09-08 ASSESSMENT — PAIN SCALES - GENERAL: PAINLEVEL_OUTOF10: 3

## 2018-09-08 ASSESSMENT — PAIN DESCRIPTION - PAIN TYPE: TYPE: ACUTE PAIN

## 2018-09-08 ASSESSMENT — PAIN DESCRIPTION - LOCATION: LOCATION: GENERALIZED

## 2018-09-08 NOTE — BH NOTE
Pt did not participate In Wellness Group at 1600 . Patient stated they were not interested in attending even with staff's encouragement.

## 2018-09-08 NOTE — PLAN OF CARE
Problem: Anger Management/Homicidal Ideation:  Goal: Ability to verbalize frustrations and anger appropriately will improve  Ability to verbalize frustrations and anger appropriately will improve   Outcome: Ongoing  PSYCHOEDUCATION GROUP NOTE    Date: 9/8/18  Start Time: 1100  End Time: 1130    Number Participants in Group:  10    Goal:  Patient will demonstrate increased interpersonal interaction   Topic: Benefits of walking/physical activity    Discipline Responsible:   OT  AT   x Nsg.  RT MHP Other       Participation Level:     None  Minimal   x Active Listener x Interactive    Monopolizing         Participation Quality:  x Appropriate  Inappropriate   x       Attentive        Intrusive          Sharing        Resistant          Supportive        Lethargic       Affective:   x Congruent  Incongruent  Blunted  Flat    Constricted  Anxious  Elated  Angry    Labile  Depressed  Other         Cognitive:  x Alert x Oriented PPTP     Concentration x G  F  P   Attention Span x G  F  P   Short-Term Memory x G  F  P   Long-Term Memory  G  F  P   ProblemSolving/  Decision Making x G  F  P   Ability to Process  Information x G  F  P      Contributing Factors             Delusional             Hallucinating             Flight of Ideas             Other:       Modes of Intervention:  x Education x Support x Exploration   x Clarifying x Problem Solving x Confrontation   x Socialization x Limit Setting x Reality Testing   x Activity x Movement  Media    Other:            Response to Learning:  x Able to verbalize current knowledge/experience   x Able to verbalize/acknowledge new learning   x Able to retain information   x Capable of insight    Able to change behavior   x Progressing to goal    Other:        Comments:

## 2018-09-08 NOTE — PROGRESS NOTES
Psychiatric Progress Note      Pertinent History & Psychiatric Examination    HPI: Ruiz Landeros is seen in his room. He is less isolative today and participation in groups has improved, and he is still presenting with less paranoid delusional thinking( the staff reported a decrease in intensity) . He also doesn't think he has a thought disorder, reports that his PTSD has been causing him flashbacks. Doesn't want to stay here but also doesn't want to go to shelter. Social work trying to facilitate. Tolerated oral/IM Abilify pretty well. Reports a drastic decrease in the frequency of flash backs. Complaints of Pain: none  Functioning Relationships: good support system      Mental Status Evaluation:  Orientation: alertness: alert   Mood:. fluctuating      Affect:  More open       Appearance:  bearded and disheveled   Activity:  Within Normal Limits   Gait/Posture: Normal   Speech:  normal pitch and normal volume   Thought Process:  circumstantial    Thought Content:  Delusions/PARANOID   Sensorium:  person, place and time/date   Cognition:  grossly intact   Memory: WNL   Insight:  limited   Judgment: limited   Suicidal Ideations: denies suicidal ideation   Homicidal Ideations: Negative for homicidal ideation      Medication Side Effects: absent       Attention Span attention span appeared shorter than expected for age     Clinical Assessment Medical Decision    Axis I: paranoid schizophrenia    Precautions with Justification:   none    Medication Review/Mgmt: Receives Haldol Decanoate 100mg, last on 8/21/18    Medical Issues: History reviewed. No pertinent past medical history. Assessment of Risk for Harm to Self/Others:  Severe paranoia, single,  male    PLAN: Added a long acting injectable aristada 441 mg(9/6/2018)    Patient's Response to Treatment: positive. We discussed the pros called side effects and interactions of abilify. Agreed on oral abilify 10 mg a day and will titrate for affect.     Ashley Garvin

## 2018-09-08 NOTE — PLAN OF CARE
Problem: Anger Management/Homicidal Ideation:  Goal: Absence of homicidal ideation  Absence of homicidal ideation   Outcome: Ongoing  Pt did not participate in Goal Setting / [employer] group at 3606 despite staff encouragement.

## 2018-09-09 PROCEDURE — 1240000000 HC EMOTIONAL WELLNESS R&B

## 2018-09-09 PROCEDURE — 6370000000 HC RX 637 (ALT 250 FOR IP): Performed by: PSYCHIATRY & NEUROLOGY

## 2018-09-09 RX ADMIN — BUSPIRONE HYDROCHLORIDE 15 MG: 15 TABLET ORAL at 21:11

## 2018-09-09 RX ADMIN — ACETAMINOPHEN 650 MG: 325 TABLET, FILM COATED ORAL at 19:33

## 2018-09-09 RX ADMIN — PROPRANOLOL HYDROCHLORIDE 10 MG: 20 TABLET ORAL at 08:19

## 2018-09-09 RX ADMIN — ZOLPIDEM TARTRATE 10 MG: 10 TABLET, FILM COATED ORAL at 21:12

## 2018-09-09 RX ADMIN — BUSPIRONE HYDROCHLORIDE 15 MG: 15 TABLET ORAL at 08:19

## 2018-09-09 RX ADMIN — ARIPIPRAZOLE 10 MG: 10 TABLET ORAL at 08:19

## 2018-09-09 RX ADMIN — PROPRANOLOL HYDROCHLORIDE 10 MG: 20 TABLET ORAL at 14:19

## 2018-09-09 RX ADMIN — PROPRANOLOL HYDROCHLORIDE 10 MG: 20 TABLET ORAL at 21:12

## 2018-09-09 RX ADMIN — BUSPIRONE HYDROCHLORIDE 15 MG: 15 TABLET ORAL at 14:20

## 2018-09-09 RX ADMIN — MULTIPLE VITAMINS W/ MINERALS TAB 1 TABLET: TAB at 08:19

## 2018-09-09 ASSESSMENT — PAIN DESCRIPTION - LOCATION
LOCATION: HAND
LOCATION: HAND

## 2018-09-09 ASSESSMENT — PAIN SCALES - GENERAL
PAINLEVEL_OUTOF10: 0
PAINLEVEL_OUTOF10: 3

## 2018-09-09 ASSESSMENT — PAIN DESCRIPTION - PAIN TYPE
TYPE: ACUTE PAIN
TYPE: ACUTE PAIN

## 2018-09-09 NOTE — PLAN OF CARE
Problem: Anger Management/Homicidal Ideation:  Goal: Absence of homicidal ideation  Absence of homicidal ideation   Outcome: Ongoing  Pt denies homicidal ideations

## 2018-09-09 NOTE — PLAN OF CARE
Problem: Anger Management/Homicidal Ideation:  Goal: Ability to verbalize frustrations and anger appropriately will improve  Ability to verbalize frustrations and anger appropriately will improve   Outcome: Ongoing  Pt did not participate in Creative Expression / Positive Coping Skill / Therapeutic Activity group at 1330 despite staff encouragement.

## 2018-09-09 NOTE — PROGRESS NOTES
Charting done this shift reviewed by Electronically signed by Rock Pulido RN on 9/8/2018 at 10:49 PM

## 2018-09-10 PROCEDURE — 6370000000 HC RX 637 (ALT 250 FOR IP): Performed by: PSYCHIATRY & NEUROLOGY

## 2018-09-10 PROCEDURE — 99232 SBSQ HOSP IP/OBS MODERATE 35: CPT | Performed by: PSYCHIATRY & NEUROLOGY

## 2018-09-10 PROCEDURE — 1240000000 HC EMOTIONAL WELLNESS R&B

## 2018-09-10 RX ADMIN — ZOLPIDEM TARTRATE 10 MG: 10 TABLET, FILM COATED ORAL at 20:05

## 2018-09-10 RX ADMIN — BUSPIRONE HYDROCHLORIDE 15 MG: 15 TABLET ORAL at 20:06

## 2018-09-10 RX ADMIN — MULTIPLE VITAMINS W/ MINERALS TAB 1 TABLET: TAB at 08:15

## 2018-09-10 RX ADMIN — HYDROXYZINE HYDROCHLORIDE 50 MG: 25 TABLET, FILM COATED ORAL at 20:05

## 2018-09-10 RX ADMIN — PROPRANOLOL HYDROCHLORIDE 10 MG: 20 TABLET ORAL at 20:05

## 2018-09-10 RX ADMIN — BUSPIRONE HYDROCHLORIDE 15 MG: 15 TABLET ORAL at 13:57

## 2018-09-10 RX ADMIN — PROPRANOLOL HYDROCHLORIDE 10 MG: 20 TABLET ORAL at 13:57

## 2018-09-10 RX ADMIN — BUSPIRONE HYDROCHLORIDE 15 MG: 15 TABLET ORAL at 08:15

## 2018-09-10 RX ADMIN — ACETAMINOPHEN 650 MG: 325 TABLET, FILM COATED ORAL at 18:37

## 2018-09-10 RX ADMIN — ARIPIPRAZOLE 10 MG: 10 TABLET ORAL at 08:15

## 2018-09-10 RX ADMIN — ACETAMINOPHEN 650 MG: 325 TABLET, FILM COATED ORAL at 09:51

## 2018-09-10 RX ADMIN — PROPRANOLOL HYDROCHLORIDE 10 MG: 20 TABLET ORAL at 08:14

## 2018-09-10 ASSESSMENT — PAIN SCALES - GENERAL
PAINLEVEL_OUTOF10: 3
PAINLEVEL_OUTOF10: 0
PAINLEVEL_OUTOF10: 0
PAINLEVEL_OUTOF10: 5
PAINLEVEL_OUTOF10: 3

## 2018-09-10 ASSESSMENT — PAIN DESCRIPTION - LOCATION
LOCATION: HAND
LOCATION: HAND
LOCATION: HEAD

## 2018-09-10 ASSESSMENT — PAIN DESCRIPTION - ORIENTATION
ORIENTATION: RIGHT

## 2018-09-10 NOTE — PLAN OF CARE
Problem: Anger Management/Homicidal Ideation:  Goal: Ability to verbalize frustrations and anger appropriately will improve  Ability to verbalize frustrations and anger appropriately will improve   Outcome: Ongoing  Pt. Isolates in room for long intervals. Pt. Took medication from nurse. Pt. Is quiet and guarded. Out for meals. Pt. Says he is having flashbacks but will not elaborate. Tells staff he is able to ignore them more than he has before. Denies hallucinations, however pt. Is noted laughing to himself in his room. Pt. Denies depression or anxiety when questioned this morning. Aloof from peers but does talk occasionally to his roommate. Pt. Remains safe on the unit. Q 15 minute checks for safety maintained. Goal: Absence of homicidal ideation  Absence of homicidal ideation   Outcome: Ongoing  Pt. Denies homicidal or suicidal thoughts. Not attending groups. On fringe when out in milieu. Pt. Remains safe on the unit. Q 15 minute checks for safety maintained.

## 2018-09-11 PROCEDURE — 6370000000 HC RX 637 (ALT 250 FOR IP): Performed by: PSYCHIATRY & NEUROLOGY

## 2018-09-11 PROCEDURE — 1240000000 HC EMOTIONAL WELLNESS R&B

## 2018-09-11 PROCEDURE — 99232 SBSQ HOSP IP/OBS MODERATE 35: CPT | Performed by: PSYCHIATRY & NEUROLOGY

## 2018-09-11 RX ADMIN — ARIPIPRAZOLE 10 MG: 10 TABLET ORAL at 08:31

## 2018-09-11 RX ADMIN — ZOLPIDEM TARTRATE 10 MG: 10 TABLET, FILM COATED ORAL at 22:10

## 2018-09-11 RX ADMIN — BUSPIRONE HYDROCHLORIDE 15 MG: 15 TABLET ORAL at 22:10

## 2018-09-11 RX ADMIN — MULTIPLE VITAMINS W/ MINERALS TAB 1 TABLET: TAB at 08:31

## 2018-09-11 RX ADMIN — PROPRANOLOL HYDROCHLORIDE 10 MG: 20 TABLET ORAL at 14:33

## 2018-09-11 RX ADMIN — PROPRANOLOL HYDROCHLORIDE 10 MG: 20 TABLET ORAL at 22:10

## 2018-09-11 RX ADMIN — PROPRANOLOL HYDROCHLORIDE 10 MG: 20 TABLET ORAL at 08:31

## 2018-09-11 RX ADMIN — BUSPIRONE HYDROCHLORIDE 15 MG: 15 TABLET ORAL at 14:33

## 2018-09-11 RX ADMIN — ACETAMINOPHEN 650 MG: 325 TABLET, FILM COATED ORAL at 18:06

## 2018-09-11 RX ADMIN — BUSPIRONE HYDROCHLORIDE 15 MG: 15 TABLET ORAL at 08:31

## 2018-09-11 ASSESSMENT — PAIN - FUNCTIONAL ASSESSMENT: PAIN_FUNCTIONAL_ASSESSMENT: 0-10

## 2018-09-11 ASSESSMENT — PAIN DESCRIPTION - DESCRIPTORS: DESCRIPTORS: ACHING;DISCOMFORT

## 2018-09-11 ASSESSMENT — PAIN SCALES - GENERAL: PAINLEVEL_OUTOF10: 3

## 2018-09-11 NOTE — PLAN OF CARE
Problem: Anger Management/Homicidal Ideation:  Goal: Ability to verbalize frustrations and anger appropriately will improve  Ability to verbalize frustrations and anger appropriately will improve   Outcome: Ongoing  Denies SI/HI and remains free from harm att, remains calm and cooperative. Denies AH, self talk noted at times. Pt is social with room mate. Goal: Absence of homicidal ideation  Absence of homicidal ideation   Outcome: Ongoing  Denies SI/HI and remains free from harm att, remains calm and cooperative. Denies AH, self talk noted at times. Pt is social with room mate.

## 2018-09-11 NOTE — PLAN OF CARE
Problem: Anger Management/Homicidal Ideation:  Goal: Ability to verbalize frustrations and anger appropriately will improve  Ability to verbalize frustrations and anger appropriately will improve   Outcome: Ongoing  Pt did not participate in Leisure Skills Therapeutic Group at 1100 despite staff encouragement.

## 2018-09-11 NOTE — PLAN OF CARE
Problem: Anger Management/Homicidal Ideation:  Goal: Ability to verbalize frustrations and anger appropriately will improve  Ability to verbalize frustrations and anger appropriately will improve   Outcome: Ongoing  [facility] Interdisciplinary Treatment Plan Note     Review Date & Time: 9/11/18 0912    Admission Type:   Admission Type: Voluntary    Reason for admission:  Reason for Admission: Voluntary from [facility]. Paranoia, delusional, and unable to care for self. Feels people in group home want to kill him, slit wrists. Denies all just states he is here because of \"chaos in the group home\"    Estimated Length of Stay:  14-21 days  Estimated Discharge Date Update:  9/17/18 to be determined by physician    PATIENT STRENGTHS:  Patient Strengths:Strengths: Communication, Connection to output provider, Medication Compliance, No significant Physical Illness  Patient Strengths and Limitations:Limitations: Unrealistic self-view, Tendency to isolate self  Addictive Behavior:Addictive Behavior  In the past 3 months, have you felt or has someone told you that you have a problem with:  : None  Do you have a history of Chemical Use?: No  Do you have a history of Alcohol Use?: No  Do you have a history of Street Drug Abuse?: No  Histroy of Prescripton Drug Abuse?: No  Medical Problems: History reviewed. No pertinent past medical history.     Risk:  Fall RiskTotal: 63  Roque Scale Roque Scale Score: 22  BVC Total: 0    Change in scores:  No. Changes to plan of Care:  No    Status EXAM:   Status and Exam  Normal: No  Facial Expression: Avoids Gaze, Flat  Affect: Blunt  Level of Consciousness: Alert  Mood:Normal: No  Mood: Depressed, Sad, Anhedonia  Motor Activity:Normal: Yes  Motor Activity: Decreased  Interview Behavior: Cooperative, Evasive  Preception: [place] to Situation  Attention:Normal: No  Attention: Unable to Concentrate  Thought Processes: Circumstantial  Thought Content:Normal: No  Thought Content: Poverty of Content  Hallucinations: None  Delusions: No  Delusions: Persecution  Memory:Normal: Yes  Memory: Confabulation  Insight and Judgment: No  Insight and Judgment: Poor Insight, Unmotivated  Present Suicidal Ideation: No  Present Homicidal Ideation: No    Daily Assessment Last Entry:   Daily Sleep (WDL): Within Defined Limits         Patient Currently in Pain: Denies  Daily Nutrition (WDL): Within Defined Limits    Patient Monitoring:  Frequency of Checks: 4 times per hour, close    Psychiatric Symptoms:   Depression Symptoms  Depression Symptoms: Loss of interest, Feelings of hopelessess, Isolative  Anxiety Symptoms  Anxiety Symptoms: No problems reported or observed. Deana Symptoms  Deana Symptoms: No problems reported or observed. Psychosis Symptoms  Delusion Type: No problems reported or observed. Suicide Risk CSSR-S:  1) Within the past month, have you wished you were dead or wished you could go to sleep and not wake up? : NO  2) Within the past month, have you actually had any thoughts of killing yourself? : NO  6)  Have you ever done anything, started to do anything, or prepared to do anything to end your life?: NO  Change in result:  no  Change in plan of care:  no    EDUCATION:   Learner Progress Toward Treatment Goals:   Reviewed results and recommendations of this team, reviewed group plan and strategies, reviewed signs, symptoms and risk of self harm and violent behavior, reviewed goals and plan of care. Method:  individual education, small group, verbal education. Outcome:    Pt verbalized understanding, but needs reinforcement to obtain goals. PATIENT GOALS:  Short term:  Discharge planning, make a list of things to do for discharge   Long term: Follow up with [facility], find new housing    PLAN/TREATMENT RECOMMENDATIONS UPDATE:   Continue with group therapies, education of coping skills   Continue to monitor patient on unit.     Medications provided/medication

## 2018-09-11 NOTE — PLAN OF CARE
Problem: Anger Management/Homicidal Ideation:  Goal: Ability to verbalize frustrations and anger appropriately will improve  Ability to verbalize frustrations and anger appropriately will improve   Outcome: Ongoing  Pt did not participate in Goal Setting and [facility] Meeting [facility] despite staff encouragement.

## 2018-09-12 PROCEDURE — 1240000000 HC EMOTIONAL WELLNESS R&B

## 2018-09-12 PROCEDURE — 6370000000 HC RX 637 (ALT 250 FOR IP): Performed by: PSYCHIATRY & NEUROLOGY

## 2018-09-12 PROCEDURE — 99232 SBSQ HOSP IP/OBS MODERATE 35: CPT | Performed by: PSYCHIATRY & NEUROLOGY

## 2018-09-12 RX ADMIN — PROPRANOLOL HYDROCHLORIDE 10 MG: 20 TABLET ORAL at 14:31

## 2018-09-12 RX ADMIN — MULTIPLE VITAMINS W/ MINERALS TAB 1 TABLET: TAB at 08:27

## 2018-09-12 RX ADMIN — BUSPIRONE HYDROCHLORIDE 15 MG: 15 TABLET ORAL at 14:32

## 2018-09-12 RX ADMIN — ZOLPIDEM TARTRATE 10 MG: 10 TABLET, FILM COATED ORAL at 20:37

## 2018-09-12 RX ADMIN — PROPRANOLOL HYDROCHLORIDE 10 MG: 20 TABLET ORAL at 20:37

## 2018-09-12 RX ADMIN — ACETAMINOPHEN 650 MG: 325 TABLET, FILM COATED ORAL at 12:37

## 2018-09-12 RX ADMIN — BUSPIRONE HYDROCHLORIDE 15 MG: 15 TABLET ORAL at 08:27

## 2018-09-12 RX ADMIN — BUSPIRONE HYDROCHLORIDE 15 MG: 15 TABLET ORAL at 20:38

## 2018-09-12 RX ADMIN — ACETAMINOPHEN 650 MG: 325 TABLET, FILM COATED ORAL at 19:50

## 2018-09-12 RX ADMIN — PROPRANOLOL HYDROCHLORIDE 10 MG: 20 TABLET ORAL at 08:27

## 2018-09-12 RX ADMIN — ARIPIPRAZOLE 10 MG: 10 TABLET ORAL at 08:27

## 2018-09-12 ASSESSMENT — PAIN SCALES - GENERAL
PAINLEVEL_OUTOF10: 3
PAINLEVEL_OUTOF10: 6
PAINLEVEL_OUTOF10: 0

## 2018-09-12 ASSESSMENT — PAIN - FUNCTIONAL ASSESSMENT: PAIN_FUNCTIONAL_ASSESSMENT: 0-10

## 2018-09-12 ASSESSMENT — PAIN DESCRIPTION - DESCRIPTORS: DESCRIPTORS: ACHING;DISCOMFORT;NAGGING

## 2018-09-12 NOTE — PLAN OF CARE
Problem: Anger Management/Homicidal Ideation:  Goal: Ability to verbalize frustrations and anger appropriately will improve  Ability to verbalize frustrations and anger appropriately will improve   Outcome: Ongoing  Pt did not participate in Mental Health Awareness / Symptoms / Coping Skills group at 1430 despite staff encouragement.

## 2018-09-12 NOTE — PLAN OF CARE
Problem: Anger Management/Homicidal Ideation:  Goal: Ability to verbalize frustrations and anger appropriately will improve  Ability to verbalize frustrations and anger appropriately will improve   Outcome: Ongoing  Pt did not participate in Leisure Skills Group at 1100 despite staff encouragement.

## 2018-09-12 NOTE — PROGRESS NOTES
Department of Psychiatry  Attending Progress Note      SUBJECTIVE:  Found him some what better in his mood. More interactive. He is still presenting with less paranoid delusional thinking. He still has some night cowart but they are improving gradually. Complaints of Pain: none  Functioning Relationships: good support system      Mental Status Evaluation:  Orientation: alertness: alert   Mood:. fluctuating      Affect:  More open       Appearance:  bearded and disheveled   Activity:  Within Normal Limits   Gait/Posture: Normal   Speech:  normal pitch and normal volume   Thought Process:  circumstantial    Thought Content:  Delusions/PARANOID   Sensorium:  person, place and time/date   Cognition:  grossly intact   Memory: WNL   Insight:  limited   Judgment: limited   Suicidal Ideations: denies suicidal ideation   Homicidal Ideations: Negative for homicidal ideation      Medication Side Effects: absent       Attention Span attention span appeared shorter than expected for age     Clinical Assessment Medical Decision    Axis I: paranoid schizophrenia    Precautions with Justification:   none    Medication Review/Mgmt: Receives Haldol Decanoate 100mg, last on 8/21/18    Medical Issues: History reviewed. No pertinent past medical history.     Assessment of Risk for Harm to Self/Others:  Severe paranoia, single,  male    PLAN: Added a long acting injectable aristada 441 mg

## 2018-09-13 VITALS
OXYGEN SATURATION: 98 % | RESPIRATION RATE: 16 BRPM | BODY MASS INDEX: 24.79 KG/M2 | SYSTOLIC BLOOD PRESSURE: 135 MMHG | HEART RATE: 95 BPM | WEIGHT: 183 LBS | TEMPERATURE: 97.5 F | DIASTOLIC BLOOD PRESSURE: 100 MMHG | HEIGHT: 72 IN

## 2018-09-13 PROCEDURE — 6370000000 HC RX 637 (ALT 250 FOR IP): Performed by: PSYCHIATRY & NEUROLOGY

## 2018-09-13 PROCEDURE — 99238 HOSP IP/OBS DSCHRG MGMT 30/<: CPT | Performed by: PSYCHIATRY & NEUROLOGY

## 2018-09-13 PROCEDURE — 5130000000 HC BRIDGE APPOINTMENT

## 2018-09-13 RX ORDER — BUSPIRONE HYDROCHLORIDE 15 MG/1
15 TABLET ORAL 3 TIMES DAILY
Qty: 90 TABLET | Refills: 0 | Status: SHIPPED | OUTPATIENT
Start: 2018-09-13 | End: 2018-09-15

## 2018-09-13 RX ORDER — ARIPIPRAZOLE 10 MG/1
10 TABLET ORAL DAILY
Qty: 30 TABLET | Refills: 0 | Status: SHIPPED | OUTPATIENT
Start: 2018-09-14 | End: 2018-09-15

## 2018-09-13 RX ADMIN — MULTIPLE VITAMINS W/ MINERALS TAB 1 TABLET: TAB at 08:09

## 2018-09-13 RX ADMIN — BUSPIRONE HYDROCHLORIDE 15 MG: 15 TABLET ORAL at 08:09

## 2018-09-13 RX ADMIN — ACETAMINOPHEN 650 MG: 325 TABLET, FILM COATED ORAL at 08:09

## 2018-09-13 RX ADMIN — PROPRANOLOL HYDROCHLORIDE 10 MG: 20 TABLET ORAL at 08:09

## 2018-09-13 RX ADMIN — ARIPIPRAZOLE 10 MG: 10 TABLET ORAL at 08:09

## 2018-09-13 ASSESSMENT — PAIN - FUNCTIONAL ASSESSMENT: PAIN_FUNCTIONAL_ASSESSMENT: 0-10

## 2018-09-13 ASSESSMENT — PAIN DESCRIPTION - DESCRIPTORS: DESCRIPTORS: ACHING;DISCOMFORT;NAGGING

## 2018-09-13 ASSESSMENT — PAIN SCALES - GENERAL: PAINLEVEL_OUTOF10: 3

## 2018-09-13 NOTE — PLAN OF CARE
Problem: Anger Management/Homicidal Ideation:  Goal: Ability to verbalize frustrations and anger appropriately will improve  Ability to verbalize frustrations and anger appropriately will improve   Outcome: Ongoing  Patient observed bright flat affect. Visible on the unit and controlled. 1:1 interaction provided. Patient is motivated for discharge. Denies any SI, HI, and hallucinations. ADL's appear intact. Encouraged to notify staff to process thoughts if needed. Med education provided. Discussed the benefits of inpatient treatment and programming expectations. Encouraged to attend groups and socialize with peers. Educated on the importance of compliance upon discharge and positive coping skills. Patient agrees. Patient is med and meal compliant. Remains free from harm and safety maintained. Will continue to monitor and intervene as needed. Goal: Absence of homicidal ideation  Absence of homicidal ideation   Outcome: Ongoing      Problem: Tobacco Use:  Goal: Inpatient tobacco use cessation counseling participation  Inpatient tobacco use cessation counseling participation   Outcome: Ongoing  Nicotine patch given. Problem: Pain:  Goal: Pain level will decrease  Pain level will decrease   Outcome: Ongoing  Tylenol given and was effective.    Goal: Control of acute pain  Control of acute pain   Outcome: Ongoing    Goal: Control of chronic pain  Control of chronic pain   Outcome: Ongoing

## 2018-09-13 NOTE — PLAN OF CARE
Problem: Anger Management/Homicidal Ideation:  Goal: Ability to verbalize frustrations and anger appropriately will improve  Ability to verbalize frustrations and anger appropriately will improve   Outcome: Ongoing  Pt did not participate in Leisure Skills Group at 1100. Pt was with staff planning discharge.

## 2018-09-13 NOTE — PLAN OF CARE
Problem: Anger Management/Homicidal Ideation:  Goal: Ability to verbalize frustrations and anger appropriately will improve  Ability to verbalize frustrations and anger appropriately will improve   Outcome: Ongoing  Pt did not participate in Goal Setting and Community Meeting [facility] despite staff encouragement.

## 2018-09-13 NOTE — DISCHARGE SUMMARY
Physician Discharge Summary     Patient ID:  Kyler Martinez  [id number]  1 y.o.  1990    Admit date: 8/26/2018    Discharge date and time: No discharge date for patient encounter. Admitting Physician: Kenny Gutierrez MD     Discharge Physician: Alonzo Sacks, MD    Admission Diagnoses: Schizophrenia ([facility] [F20.9]  Schizophrenia ([facility] [F20.9]    Discharge Diagnoses: same    Admission Condition: serious    Discharged Condition: good    Indication for Admission: worsening psychoses,suicidal ideation    Hospital Course: Admitted to psych unit. Resumed and adjusted medications. His mood improved and psychoses resolved later he denied any suicidal ideation. Consults: none    Significant Diagnostic Studies: labs:          Treatments: mood stabilizers    Discharge Mental Status Examination:  Level of consciousness:  alert, awake, oriented  Appearance:  fair grooming  Behavior/Motor:  normal  Attitude toward examiner:  cooperative  Speech:  normal  Mood:  euthymic  Affect:  mood congruent  Thought processes:  linear and coherent  Thought content:  Homocidal ideation denies  Suicidal Ideation:  denies  Delusions:  no evidence of delusions  Perceptual Disturbance:  denies any perceptual disturbance  Cognition:  oriented to person, place, and time  Concentration failed 5-letter word backwards  Memory intact  nsight:  fair  Judgment:  Fair       Disposition: home    Patient Instructions:   [unfilled]  Activity: activity as tolerated  Diet: regular diet    Follow-up with psychiatrist in 4 weeks.     Time spent 28 minutes    Signed:  Alonzo Sacks  9/13/2018  10:35 AM

## 2018-09-14 NOTE — CARE COORDINATION
Name: Supriya Perdomo    : 1990    Discharge Date: 18    Primary Auth/Cert #: [id number]    Discharged to Group Home     Discharge Medications:      Medication List      START taking these medications    ARIPiprazole 10 MG tablet  Commonly known as:  ABILIFY  Take 1 tablet by mouth daily  Notes to patient: To improve mood     ARIPiprazole lauroxil 441 MG/1.6ML Prsy injection  Commonly known as:  ARISTADA  Inject 1.6 mLs into the muscle every 30 days  Notes to patient: To clear thoughts        CHANGE how you take these medications    nicotine 21 MG/24HR  Commonly known as:  Rannie Pour  What changed:  Another medication with the same name was removed. Continue taking this medication, and follow the directions you see here. Notes to patient:  For smoking cessation      zolpidem 10 MG tablet  Commonly known as:  AMBIEN  What changed:  Another medication with the same name was removed. Continue taking this medication, and follow the directions you see here. Notes to patient:  For sleep        CONTINUE taking these medications    benztropine 1 MG tablet  Commonly known as:  COGENTIN  Notes to patient: For medication side effects     busPIRone 15 MG tablet  Commonly known as:  BUSPAR  Take 15 mg by mouth 3 times daily  Notes to patient: To treat anxiety     propranolol 10 MG tablet  Commonly known as:  INDERAL  Notes to patient:   To treat high blood pressure      therapeutic multivitamin-minerals tablet  Notes to patient:  For vitamin supplement         STOP taking these medications    HALDOL DECANOATE 100 MG/ML injection  Generic drug:  haloperidol decanoate     haloperidol decanoate 100 MG/ML injection  Commonly known as:  HALDOL DECANOATE     INVEGA SUSTENNA 234 MG/1.5ML Susp IM injection  Generic drug:  paliperidone palmitate     VISTARIL 50 MG capsule  Generic drug:  hydrOXYzine           Where to Get Your Medications      You can get these medications from any pharmacy    Bring a paper prescription for each of these medications  · ARIPiprazole 10 MG tablet  · ARIPiprazole lauroxil 441 MG/1.6ML Prsy injection  · busPIRone 15 MG tablet      Pharmacy Instructions:      Prescriptions: sent with patient                    Follow Up Appointment: [address] [address]  [phone]    Go on 9/18/2018  ACT team assessment @ 1:00 pm with Dr. Dg Luna / hospital discharge appointment

## 2018-09-15 ENCOUNTER — HOSPITAL ENCOUNTER (EMERGENCY)
Age: 28
Discharge: HOME | End: 2018-09-15
Attending: EMERGENCY MEDICINE
Payer: COMMERCIAL

## 2018-09-15 VITALS
OXYGEN SATURATION: 98 % | WEIGHT: 175 LBS | DIASTOLIC BLOOD PRESSURE: 96 MMHG | TEMPERATURE: 98.4 F | BODY MASS INDEX: 23.7 KG/M2 | HEART RATE: 108 BPM | SYSTOLIC BLOOD PRESSURE: 146 MMHG | HEIGHT: 72 IN | RESPIRATION RATE: 18 BRPM

## 2018-09-15 DIAGNOSIS — F22 DELUSIONS (HCC): Primary | ICD-10-CM

## 2018-09-15 DIAGNOSIS — Z76.0 ENCOUNTER FOR MEDICATION REFILL: ICD-10-CM

## 2018-09-15 PROCEDURE — 99285 EMERGENCY DEPT VISIT HI MDM: CPT

## 2018-09-15 PROCEDURE — 6370000000 HC RX 637 (ALT 250 FOR IP): Performed by: EMERGENCY MEDICINE

## 2018-09-15 RX ORDER — BENZTROPINE MESYLATE 1 MG/1
1 TABLET ORAL ONCE
Status: COMPLETED | OUTPATIENT
Start: 2018-09-15 | End: 2018-09-15

## 2018-09-15 RX ORDER — ARIPIPRAZOLE 10 MG/1
10 TABLET, ORALLY DISINTEGRATING ORAL ONCE
Status: COMPLETED | OUTPATIENT
Start: 2018-09-15 | End: 2018-09-15

## 2018-09-15 RX ORDER — BUSPIRONE HYDROCHLORIDE 15 MG/1
15 TABLET ORAL 3 TIMES DAILY
Qty: 15 TABLET | Refills: 0 | Status: ON HOLD | OUTPATIENT
Start: 2018-09-15 | End: 2018-10-03 | Stop reason: HOSPADM

## 2018-09-15 RX ORDER — BENZTROPINE MESYLATE 1 MG/1
1 TABLET ORAL 3 TIMES DAILY
Qty: 15 TABLET | Refills: 0 | Status: ON HOLD | OUTPATIENT
Start: 2018-09-15 | End: 2018-10-03 | Stop reason: HOSPADM

## 2018-09-15 RX ORDER — BUSPIRONE HYDROCHLORIDE 15 MG/1
15 TABLET ORAL ONCE
Status: COMPLETED | OUTPATIENT
Start: 2018-09-15 | End: 2018-09-15

## 2018-09-15 RX ORDER — ARIPIPRAZOLE 10 MG/1
10 TABLET ORAL DAILY
Qty: 5 TABLET | Refills: 0 | Status: ON HOLD | OUTPATIENT
Start: 2018-09-15 | End: 2018-10-03 | Stop reason: HOSPADM

## 2018-09-15 RX ADMIN — BUSPIRONE HYDROCHLORIDE 15 MG: 15 TABLET ORAL at 12:52

## 2018-09-15 RX ADMIN — BENZTROPINE MESYLATE 1 MG: 1 TABLET ORAL at 12:52

## 2018-09-15 RX ADMIN — ARIPIPRAZOLE 10 MG: 10 TABLET, ORALLY DISINTEGRATING ORAL at 12:52

## 2018-09-15 ASSESSMENT — ENCOUNTER SYMPTOMS
NAUSEA: 0
COLOR CHANGE: 0
ABDOMINAL PAIN: 0
CHEST TIGHTNESS: 0
VOMITING: 0
SHORTNESS OF BREATH: 0

## 2018-09-15 NOTE — ED NOTES
Dr Marla Archuleta stated he is familiar with patient. Dr Marla Archuleta stated patient does not meet inpatient criteria, patient may be discharged to shelter.       CAITLYN Servin, [place]  09/15/18 9942

## 2018-09-15 NOTE — ED NOTES
Clothes in washer and dryer then FELICITA Ascencio 41, RN  09/15/18 [place] 349, RN  09/15/18 7758
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
## 2018-09-15 NOTE — ED PROVIDER NOTES
delusions. Plan is psychiatric evaluation. Evan Durant.  Andreia Walker MD, [facility]  Attending Emergency  Physician               Ryne Merchant MD  09/15/18 7349

## 2018-09-20 ENCOUNTER — HOSPITAL ENCOUNTER (INPATIENT)
Age: 28
LOS: 13 days | Discharge: HOME | DRG: 750 | End: 2018-10-03
Attending: EMERGENCY MEDICINE | Admitting: PSYCHIATRY & NEUROLOGY
Payer: COMMERCIAL

## 2018-09-20 DIAGNOSIS — F20.9 SCHIZOPHRENIA, UNSPECIFIED TYPE (HCC): Primary | ICD-10-CM

## 2018-09-20 PROBLEM — F22 PSYCHOSIS, PARANOID (HCC): Status: ACTIVE | Noted: 2018-09-20

## 2018-09-20 PROCEDURE — 99284 EMERGENCY DEPT VISIT MOD MDM: CPT

## 2018-09-20 PROCEDURE — 1240000000 HC EMOTIONAL WELLNESS R&B

## 2018-09-20 PROCEDURE — 6370000000 HC RX 637 (ALT 250 FOR IP): Performed by: NURSE PRACTITIONER

## 2018-09-20 RX ORDER — TRAZODONE HYDROCHLORIDE 50 MG/1
50 TABLET ORAL NIGHTLY PRN
Status: DISCONTINUED | OUTPATIENT
Start: 2018-09-21 | End: 2018-10-03 | Stop reason: HOSPADM

## 2018-09-20 RX ORDER — ACETAMINOPHEN 325 MG/1
650 TABLET ORAL EVERY 4 HOURS PRN
Status: DISCONTINUED | OUTPATIENT
Start: 2018-09-20 | End: 2018-10-03 | Stop reason: HOSPADM

## 2018-09-20 RX ORDER — DOCUSATE SODIUM 100 MG/1
100 CAPSULE, LIQUID FILLED ORAL 2 TIMES DAILY
Status: DISCONTINUED | OUTPATIENT
Start: 2018-09-20 | End: 2018-10-03 | Stop reason: HOSPADM

## 2018-09-20 RX ORDER — LORATADINE 10 MG/1
10 TABLET ORAL DAILY
COMMUNITY

## 2018-09-20 RX ORDER — LORAZEPAM 2 MG/ML
1 INJECTION INTRAMUSCULAR EVERY 4 HOURS PRN
Status: DISCONTINUED | OUTPATIENT
Start: 2018-09-20 | End: 2018-10-03 | Stop reason: HOSPADM

## 2018-09-20 RX ORDER — NICOTINE 21 MG/24HR
1 PATCH, TRANSDERMAL 24 HOURS TRANSDERMAL DAILY
Status: DISCONTINUED | OUTPATIENT
Start: 2018-09-21 | End: 2018-10-03 | Stop reason: HOSPADM

## 2018-09-20 RX ORDER — HYDROXYZINE HYDROCHLORIDE 25 MG/1
50 TABLET, FILM COATED ORAL 3 TIMES DAILY PRN
Status: DISCONTINUED | OUTPATIENT
Start: 2018-09-20 | End: 2018-10-03 | Stop reason: HOSPADM

## 2018-09-20 RX ADMIN — HYDROXYZINE HYDROCHLORIDE 50 MG: 25 TABLET, FILM COATED ORAL at 21:48

## 2018-09-20 RX ADMIN — ACETAMINOPHEN 650 MG: 325 TABLET, FILM COATED ORAL at 21:48

## 2018-09-20 ASSESSMENT — SLEEP AND FATIGUE QUESTIONNAIRES
RESTFUL SLEEP: YES
DIFFICULTY ARISING: NO
SLEEP PATTERN: NORMAL
DO YOU USE A SLEEP AID: YES
DIFFICULTY STAYING ASLEEP: NO
DIFFICULTY FALLING ASLEEP: NO
DO YOU HAVE DIFFICULTY SLEEPING: NO
AVERAGE NUMBER OF SLEEP HOURS: 8

## 2018-09-20 ASSESSMENT — ENCOUNTER SYMPTOMS
ABDOMINAL PAIN: 0
EYE DISCHARGE: 0
EYE REDNESS: 0
SHORTNESS OF BREATH: 0
COUGH: 0
RHINORRHEA: 0
VOMITING: 0
BACK PAIN: 0
DIARRHEA: 0
EYE PAIN: 0

## 2018-09-20 ASSESSMENT — PAIN DESCRIPTION - DESCRIPTORS
DESCRIPTORS: HEADACHE
DESCRIPTORS: HEADACHE

## 2018-09-20 ASSESSMENT — PAIN SCALES - GENERAL
PAINLEVEL_OUTOF10: 7
PAINLEVEL_OUTOF10: 0
PAINLEVEL_OUTOF10: 0

## 2018-09-20 ASSESSMENT — LIFESTYLE VARIABLES: HISTORY_ALCOHOL_USE: NO

## 2018-09-20 ASSESSMENT — PAIN DESCRIPTION - PAIN TYPE
TYPE: ACUTE PAIN
TYPE: ACUTE PAIN

## 2018-09-20 NOTE — ED PROVIDER NOTES
[address]  eMERGENCY dEPARTMENT eNCOUnter      Pt Name: Bobbi Goodpasture  MRN: [id number]  [place] 1990  Date of evaluation: 9/20/2018  PCP:    No primary care provider on file. CHIEF COMPLAINT       Chief Complaint   Patient presents with    Psychiatric Evaluation         HISTORY OF PRESENT ILLNESS      Bobbi Goodpasture is a 32 y.o. male who presents For evaluation for his schizophrenia. Patient does have history of schizophrenia however he declines it. Patient states he somewhat (medications. Patient states he is homeless as well he is at the shelter. Denies any medical issues or for high blood pressure. Patient otherwise he feels fine. No recent drugs or alcohol. No thoughts of hurting himself or others. Otherwise no other complaints       REVIEW OF SYSTEMS         Review of Systems   Constitutional: Negative for chills and fever. HENT: Negative for congestion and rhinorrhea. Eyes: Negative for pain, discharge and redness. Respiratory: Negative for cough and shortness of breath. Cardiovascular: Negative for chest pain. Gastrointestinal: Negative for abdominal pain, diarrhea and vomiting. Genitourinary: Negative for dysuria and hematuria. Musculoskeletal: Negative for arthralgias, back pain, myalgias, neck pain and neck stiffness. Skin: Negative for rash. Neurological: Negative for light-headedness and headaches. Hematological: Does not bruise/bleed easily. Psychiatric/Behavioral: Negative for suicidal ideas. The patient is nervous/anxious. PAST MEDICAL HISTORY     Past Medical History:   Diagnosis Date    Hypertension     Schizophrenia, schizo-affective ([facility] [address]        SURGICAL HISTORY     History reviewed. No pertinent surgical history.     CURRENT MEDICATIONS       Previous Medications    ARIPIPRAZOLE (ABILIFY) 10 MG TABLET    Take 1 tablet by mouth daily    ARIPIPRAZOLE LAUROXIL (ARISTADA) 441 MG/1.6ML PRSY INJECTION    Inject 1.6 pulses. Pulmonary/Chest: Effort normal and breath sounds normal. No respiratory distress. He has no wheezes. Abdominal: Soft. Bowel sounds are normal. He exhibits no distension. There is no tenderness. Musculoskeletal: Normal range of motion. Neurological: He is alert and oriented to person, place, and time. Skin: Skin is warm and dry. Nursing note and vitals reviewed. DIFFERENTIAL DIAGNOSIS/ MDM:     Patient medically stable this time for psychiatric evaluation and admission    DIAGNOSTIC RESULTS     none    EMERGENCY DEPARTMENT COURSE:   Vitals:    Vitals:    09/20/18 1527   BP: (!) 172/87   Pulse: 90   Resp: 16   Temp: 97.9 °F (36.6 °C)   TempSrc: Tympanic   SpO2: 99%   Weight: 180 lb (81.6 kg)   Height: 6' (1.829 m)     -------------------------  BP: (!) 172/87, Temp: 97.9 °F (36.6 °C), Pulse: 90, Resp: 16      FINAL IMPRESSION      1.  Schizophrenia, unspecified type [facility])          DISPOSITION/PLAN    DISPOSITION Admitted 09/20/2018 05:44:50 PM        (Please note that portions of this note were completed with a voice recognition program.  Efforts were made to edit the dictations but occasionally words are mis-transcribed.)    Sherman Galvin MD, DERIK, [facility]  Attending Emergency Physician                     Sherman Galvin MD  09/20/18 0596

## 2018-09-21 PROBLEM — F22 PSYCHOSIS, PARANOID (HCC): Status: RESOLVED | Noted: 2018-09-20 | Resolved: 2018-09-21

## 2018-09-21 PROBLEM — F12.20 CANNABIS DEPENDENCE (HCC): Chronic | Status: ACTIVE | Noted: 2018-09-21

## 2018-09-21 PROCEDURE — 6370000000 HC RX 637 (ALT 250 FOR IP): Performed by: NURSE PRACTITIONER

## 2018-09-21 PROCEDURE — 1240000000 HC EMOTIONAL WELLNESS R&B

## 2018-09-21 PROCEDURE — 90792 PSYCH DIAG EVAL W/MED SRVCS: CPT | Performed by: PSYCHIATRY & NEUROLOGY

## 2018-09-21 PROCEDURE — 6370000000 HC RX 637 (ALT 250 FOR IP): Performed by: PSYCHIATRY & NEUROLOGY

## 2018-09-21 RX ORDER — ARIPIPRAZOLE 10 MG/1
10 TABLET ORAL DAILY
Status: DISCONTINUED | OUTPATIENT
Start: 2018-09-21 | End: 2018-09-30

## 2018-09-21 RX ORDER — M-VIT,TX,IRON,MINS/CALC/FOLIC 27MG-0.4MG
1 TABLET ORAL DAILY
Status: DISCONTINUED | OUTPATIENT
Start: 2018-09-21 | End: 2018-10-03 | Stop reason: HOSPADM

## 2018-09-21 RX ORDER — CETIRIZINE HYDROCHLORIDE 10 MG/1
10 TABLET ORAL DAILY
Status: DISCONTINUED | OUTPATIENT
Start: 2018-09-21 | End: 2018-10-03 | Stop reason: HOSPADM

## 2018-09-21 RX ORDER — BUSPIRONE HYDROCHLORIDE 15 MG/1
15 TABLET ORAL 3 TIMES DAILY
Status: DISCONTINUED | OUTPATIENT
Start: 2018-09-21 | End: 2018-10-03 | Stop reason: HOSPADM

## 2018-09-21 RX ORDER — HALOPERIDOL DECANOATE 100 MG/ML
150 INJECTION INTRAMUSCULAR
Status: ON HOLD | COMMUNITY
Start: 2018-09-14 | End: 2018-09-21 | Stop reason: CLARIF

## 2018-09-21 RX ORDER — PROPRANOLOL HYDROCHLORIDE 20 MG/1
10 TABLET ORAL 2 TIMES DAILY
Status: DISCONTINUED | OUTPATIENT
Start: 2018-09-21 | End: 2018-10-03 | Stop reason: HOSPADM

## 2018-09-21 RX ADMIN — HYDROXYZINE HYDROCHLORIDE 50 MG: 25 TABLET, FILM COATED ORAL at 20:33

## 2018-09-21 RX ADMIN — ACETAMINOPHEN 650 MG: 325 TABLET, FILM COATED ORAL at 08:34

## 2018-09-21 RX ADMIN — DOCUSATE SODIUM 100 MG: 100 CAPSULE, LIQUID FILLED ORAL at 20:33

## 2018-09-21 RX ADMIN — PROPRANOLOL HYDROCHLORIDE 10 MG: 20 TABLET ORAL at 20:33

## 2018-09-21 RX ADMIN — BUSPIRONE HYDROCHLORIDE 15 MG: 15 TABLET ORAL at 20:34

## 2018-09-21 RX ADMIN — MULTIPLE VITAMINS W/ MINERALS TAB 1 TABLET: TAB at 13:54

## 2018-09-21 RX ADMIN — BUSPIRONE HYDROCHLORIDE 15 MG: 15 TABLET ORAL at 13:54

## 2018-09-21 RX ADMIN — HYDROXYZINE HYDROCHLORIDE 50 MG: 25 TABLET, FILM COATED ORAL at 08:34

## 2018-09-21 RX ADMIN — CETIRIZINE HYDROCHLORIDE 10 MG: 10 TABLET, FILM COATED ORAL at 13:54

## 2018-09-21 RX ADMIN — ACETAMINOPHEN 650 MG: 325 TABLET, FILM COATED ORAL at 16:22

## 2018-09-21 RX ADMIN — TRAZODONE HYDROCHLORIDE 50 MG: 50 TABLET ORAL at 20:34

## 2018-09-21 RX ADMIN — ARIPIPRAZOLE 10 MG: 10 TABLET ORAL at 13:54

## 2018-09-21 RX ADMIN — PROPRANOLOL HYDROCHLORIDE 10 MG: 20 TABLET ORAL at 13:54

## 2018-09-21 ASSESSMENT — PAIN SCALES - GENERAL
PAINLEVEL_OUTOF10: 0
PAINLEVEL_OUTOF10: 0
PAINLEVEL_OUTOF10: 3
PAINLEVEL_OUTOF10: 3

## 2018-09-21 ASSESSMENT — PAIN DESCRIPTION - ORIENTATION: ORIENTATION: RIGHT

## 2018-09-21 ASSESSMENT — LIFESTYLE VARIABLES: HISTORY_ALCOHOL_USE: NO

## 2018-09-21 ASSESSMENT — PAIN DESCRIPTION - PAIN TYPE: TYPE: CHRONIC PAIN

## 2018-09-21 ASSESSMENT — PAIN DESCRIPTION - LOCATION: LOCATION: WRIST

## 2018-09-21 NOTE — PLAN OF CARE
Problem: Altered Mood, Psychotic Behavior:  Goal: Able to verbalize reality based thinking  Able to verbalize reality based thinking   Outcome: Ongoing  Pt did not participate in community meeting/ goals group at 0900 despite staff encouragement to attend.

## 2018-09-21 NOTE — PROGRESS NOTES

## 2018-09-22 PROCEDURE — 6370000000 HC RX 637 (ALT 250 FOR IP): Performed by: PSYCHIATRY & NEUROLOGY

## 2018-09-22 PROCEDURE — 1240000000 HC EMOTIONAL WELLNESS R&B

## 2018-09-22 PROCEDURE — 6370000000 HC RX 637 (ALT 250 FOR IP): Performed by: NURSE PRACTITIONER

## 2018-09-22 RX ADMIN — HYDROXYZINE HYDROCHLORIDE 50 MG: 25 TABLET, FILM COATED ORAL at 21:04

## 2018-09-22 RX ADMIN — BUSPIRONE HYDROCHLORIDE 15 MG: 15 TABLET ORAL at 08:22

## 2018-09-22 RX ADMIN — BUSPIRONE HYDROCHLORIDE 15 MG: 15 TABLET ORAL at 21:04

## 2018-09-22 RX ADMIN — CETIRIZINE HYDROCHLORIDE 10 MG: 10 TABLET, FILM COATED ORAL at 08:23

## 2018-09-22 RX ADMIN — BUSPIRONE HYDROCHLORIDE 15 MG: 15 TABLET ORAL at 14:24

## 2018-09-22 RX ADMIN — PROPRANOLOL HYDROCHLORIDE 10 MG: 20 TABLET ORAL at 08:23

## 2018-09-22 RX ADMIN — PROPRANOLOL HYDROCHLORIDE 10 MG: 20 TABLET ORAL at 21:04

## 2018-09-22 RX ADMIN — ARIPIPRAZOLE 10 MG: 10 TABLET ORAL at 08:23

## 2018-09-22 RX ADMIN — ACETAMINOPHEN 650 MG: 325 TABLET, FILM COATED ORAL at 19:20

## 2018-09-22 RX ADMIN — MULTIPLE VITAMINS W/ MINERALS TAB 1 TABLET: TAB at 08:23

## 2018-09-22 ASSESSMENT — PAIN DESCRIPTION - LOCATION
LOCATION: BACK
LOCATION: HEAD

## 2018-09-22 ASSESSMENT — PAIN SCALES - GENERAL
PAINLEVEL_OUTOF10: 6
PAINLEVEL_OUTOF10: 3
PAINLEVEL_OUTOF10: 0

## 2018-09-22 ASSESSMENT — PAIN DESCRIPTION - PAIN TYPE: TYPE: ACUTE PAIN

## 2018-09-22 ASSESSMENT — PAIN DESCRIPTION - DESCRIPTORS: DESCRIPTORS: HEADACHE
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
## 2018-09-22 NOTE — H&P
Social History    Marital status: Single       Spouse name: N/A    Number of children: N/A    Years of education: N/A             Social History Main Topics    Smoking status: Current Every Day Smoker    Smokeless tobacco: Never Used         Comment: pt on nicotine patch    Alcohol use No    Drug use: No    Sexual activity: Not Asked           Other Topics Concern    None          Social History Narrative    None               REVIEW OF SYSTEMS       No Known Allergies     No current facility-administered medications on file prior to encounter.              Current Outpatient Prescriptions on File Prior to Encounter   Medication Sig Dispense Refill    benztropine (COGENTIN) 1 MG tablet Take 1 mg by mouth 3 times daily                           General health:  Patient states health is not good any more, Life is not good any more,    No recent infections or  fever or chills. Skin:  No itching, No skin problems     Head, eyes, ears, nose, throat:  Has no headaches, and has no dizziness, No hearing loss or vision change, Has blackened front teeth from coffee,  Denies hearing loss, No recent epistaxis or sinus problems or sore throat. Has no neck pain.     Cardiovascular/Respiratory system:  Denies any chest pain or palpatations, Has no  shortness of breath or cough,      Gastrointestinal tract: Denies any abdominal pain, nausea, vomiting, or change in bowel habits,      Genitourinary:  Currently has no UTI sx or pain with voiding.      Locomotor:  No bone or joint pains. No swelling or deformities.       Neuropsychiatric:  No referable complaints.     See HPI.  Bipolar,   Depression   GENERAL PHYSICAL EXAM:         Vitals: BP (!) 106/55   Pulse 77   Temp 97.3 °F (36.3 °C) (Oral)   Resp 14   Ht 6' (1.829 m)   Wt 183 lb (83 kg)   BMI 24.82 kg/m²  Body mass index is 24.82 kg/m².      GENERAL APPEARANCE:  Ana Seat is 32 y.o.,  male, not obese, Alert and has
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
## 2018-09-23 PROCEDURE — 6370000000 HC RX 637 (ALT 250 FOR IP): Performed by: NURSE PRACTITIONER

## 2018-09-23 PROCEDURE — 99232 SBSQ HOSP IP/OBS MODERATE 35: CPT | Performed by: PSYCHIATRY & NEUROLOGY

## 2018-09-23 PROCEDURE — 1240000000 HC EMOTIONAL WELLNESS R&B

## 2018-09-23 PROCEDURE — 6370000000 HC RX 637 (ALT 250 FOR IP): Performed by: PSYCHIATRY & NEUROLOGY

## 2018-09-23 RX ADMIN — BUSPIRONE HYDROCHLORIDE 15 MG: 15 TABLET ORAL at 14:40

## 2018-09-23 RX ADMIN — ACETAMINOPHEN 650 MG: 325 TABLET, FILM COATED ORAL at 18:33

## 2018-09-23 RX ADMIN — MULTIPLE VITAMINS W/ MINERALS TAB 1 TABLET: TAB at 08:05

## 2018-09-23 RX ADMIN — ARIPIPRAZOLE 10 MG: 10 TABLET ORAL at 08:04

## 2018-09-23 RX ADMIN — PROPRANOLOL HYDROCHLORIDE 10 MG: 20 TABLET ORAL at 08:04

## 2018-09-23 RX ADMIN — HYDROXYZINE HYDROCHLORIDE 50 MG: 25 TABLET, FILM COATED ORAL at 20:35

## 2018-09-23 RX ADMIN — PROPRANOLOL HYDROCHLORIDE 10 MG: 20 TABLET ORAL at 20:34

## 2018-09-23 RX ADMIN — BUSPIRONE HYDROCHLORIDE 15 MG: 15 TABLET ORAL at 08:04

## 2018-09-23 RX ADMIN — BUSPIRONE HYDROCHLORIDE 15 MG: 15 TABLET ORAL at 20:34

## 2018-09-23 RX ADMIN — CETIRIZINE HYDROCHLORIDE 10 MG: 10 TABLET, FILM COATED ORAL at 08:05

## 2018-09-23 ASSESSMENT — PAIN DESCRIPTION - LOCATION: LOCATION: BACK

## 2018-09-23 ASSESSMENT — PAIN SCALES - GENERAL
PAINLEVEL_OUTOF10: 3
PAINLEVEL_OUTOF10: 5
PAINLEVEL_OUTOF10: 0

## 2018-09-23 NOTE — PLAN OF CARE
Problem: Altered Mood, Psychotic Behavior:  Goal: Able to verbalize reality based thinking  Able to verbalize reality based thinking   Outcome: Ongoing  Pt continues to deny his dx of schizophrenia states \"I'm not crazy, I don't have schizophrenia. I have PTSD. \"   Goal: Ability to interact with others will improve  Ability to interact with others will improve   Outcome: Ongoing  Pt is aloof of peers in milieu. Goal: Compliance with prescribed medication regimen will improve  Compliance with prescribed medication regimen will improve   Outcome: Ongoing  Pt takes meds as ordered.

## 2018-09-23 NOTE — PLAN OF CARE
Problem: Altered Mood, Psychotic Behavior:  Goal: Able to verbalize reality based thinking  Able to verbalize reality based thinking   Outcome: Ongoing  Pt did not participate in Therapeutic Leisure Skills Group at 1330 despite staff encouragement.

## 2018-09-24 PROCEDURE — 6370000000 HC RX 637 (ALT 250 FOR IP): Performed by: NURSE PRACTITIONER

## 2018-09-24 PROCEDURE — 99232 SBSQ HOSP IP/OBS MODERATE 35: CPT | Performed by: PSYCHIATRY & NEUROLOGY

## 2018-09-24 PROCEDURE — 6370000000 HC RX 637 (ALT 250 FOR IP): Performed by: PSYCHIATRY & NEUROLOGY

## 2018-09-24 PROCEDURE — 1240000000 HC EMOTIONAL WELLNESS R&B

## 2018-09-24 RX ADMIN — MULTIPLE VITAMINS W/ MINERALS TAB 1 TABLET: TAB at 08:22

## 2018-09-24 RX ADMIN — ACETAMINOPHEN 650 MG: 325 TABLET, FILM COATED ORAL at 21:33

## 2018-09-24 RX ADMIN — BUSPIRONE HYDROCHLORIDE 15 MG: 15 TABLET ORAL at 08:22

## 2018-09-24 RX ADMIN — BUSPIRONE HYDROCHLORIDE 15 MG: 15 TABLET ORAL at 21:32

## 2018-09-24 RX ADMIN — PROPRANOLOL HYDROCHLORIDE 10 MG: 20 TABLET ORAL at 08:22

## 2018-09-24 RX ADMIN — ARIPIPRAZOLE 10 MG: 10 TABLET ORAL at 08:22

## 2018-09-24 RX ADMIN — BUSPIRONE HYDROCHLORIDE 15 MG: 15 TABLET ORAL at 13:28

## 2018-09-24 RX ADMIN — DOCUSATE SODIUM 100 MG: 100 CAPSULE, LIQUID FILLED ORAL at 21:32

## 2018-09-24 RX ADMIN — CETIRIZINE HYDROCHLORIDE 10 MG: 10 TABLET, FILM COATED ORAL at 08:22

## 2018-09-24 RX ADMIN — PROPRANOLOL HYDROCHLORIDE 10 MG: 20 TABLET ORAL at 21:32

## 2018-09-24 RX ADMIN — DOCUSATE SODIUM 100 MG: 100 CAPSULE, LIQUID FILLED ORAL at 08:22

## 2018-09-24 ASSESSMENT — PAIN SCALES - GENERAL
PAINLEVEL_OUTOF10: 3
PAINLEVEL_OUTOF10: 4
PAINLEVEL_OUTOF10: 0

## 2018-09-24 ASSESSMENT — PAIN DESCRIPTION - PAIN TYPE
TYPE: CHRONIC PAIN
TYPE: CHRONIC PAIN

## 2018-09-24 ASSESSMENT — PAIN DESCRIPTION - LOCATION
LOCATION: BACK
LOCATION: BACK

## 2018-09-24 ASSESSMENT — PAIN DESCRIPTION - ORIENTATION: ORIENTATION: RIGHT

## 2018-09-25 PROCEDURE — 6370000000 HC RX 637 (ALT 250 FOR IP): Performed by: PSYCHIATRY & NEUROLOGY

## 2018-09-25 PROCEDURE — 1240000000 HC EMOTIONAL WELLNESS R&B

## 2018-09-25 PROCEDURE — 99232 SBSQ HOSP IP/OBS MODERATE 35: CPT | Performed by: PSYCHIATRY & NEUROLOGY

## 2018-09-25 PROCEDURE — 6370000000 HC RX 637 (ALT 250 FOR IP): Performed by: NURSE PRACTITIONER

## 2018-09-25 RX ADMIN — PROPRANOLOL HYDROCHLORIDE 10 MG: 20 TABLET ORAL at 21:19

## 2018-09-25 RX ADMIN — BUSPIRONE HYDROCHLORIDE 15 MG: 15 TABLET ORAL at 08:26

## 2018-09-25 RX ADMIN — ARIPIPRAZOLE 10 MG: 10 TABLET ORAL at 08:26

## 2018-09-25 RX ADMIN — DOCUSATE SODIUM 100 MG: 100 CAPSULE, LIQUID FILLED ORAL at 08:27

## 2018-09-25 RX ADMIN — PROPRANOLOL HYDROCHLORIDE 10 MG: 20 TABLET ORAL at 08:26

## 2018-09-25 RX ADMIN — CETIRIZINE HYDROCHLORIDE 10 MG: 10 TABLET, FILM COATED ORAL at 08:27

## 2018-09-26 PROCEDURE — 6370000000 HC RX 637 (ALT 250 FOR IP): Performed by: NURSE PRACTITIONER

## 2018-09-26 PROCEDURE — 99232 SBSQ HOSP IP/OBS MODERATE 35: CPT | Performed by: PSYCHIATRY & NEUROLOGY

## 2018-09-26 PROCEDURE — 6370000000 HC RX 637 (ALT 250 FOR IP): Performed by: PSYCHIATRY & NEUROLOGY

## 2018-09-26 PROCEDURE — 1240000000 HC EMOTIONAL WELLNESS R&B

## 2018-09-26 RX ADMIN — PROPRANOLOL HYDROCHLORIDE 10 MG: 20 TABLET ORAL at 08:25

## 2018-09-26 NOTE — PLAN OF CARE
Problem: Altered Mood, Psychotic Behavior:  Goal: Able to verbalize reality based thinking  Able to verbalize reality based thinking   Outcome: Ongoing  Pt stated during 1:1 conversation he has been misdiagnosed and that he has a pardon from the president, writer attempted to educate patient on the importance of not stopping medication. Pt verbalized no understanding of this.   Goal: Ability to interact with others will improve  Ability to interact with others will improve   Outcome: Ongoing    Goal: Compliance with prescribed medication regimen will improve  Compliance with prescribed medication regimen will improve   Outcome: Ongoing  Refusing medication

## 2018-09-27 PROCEDURE — 99232 SBSQ HOSP IP/OBS MODERATE 35: CPT | Performed by: PSYCHIATRY & NEUROLOGY

## 2018-09-27 PROCEDURE — 1240000000 HC EMOTIONAL WELLNESS R&B

## 2018-09-27 NOTE — PROGRESS NOTES
Charting done this shift reviewed by Electronically signed by Karon Pride RN on 9/26/2018 at 10:58 PM

## 2018-09-27 NOTE — PLAN OF CARE
Problem: Altered Mood, Psychotic Behavior:  Goal: Able to verbalize reality based thinking  Able to verbalize reality based thinking   Outcome: Ongoing  Patient observed with flat, worried , and incongruent affect. Withdrawn and isolative, but is controlled. 1:1 interaction provided. Patient observed guarded and hesitant to engage. Denies any SI, HI, and hallucinations, but appears preoccupied. Patient is unkempt. ADL's encouraged. Patient appears paranoid and delusional. Refused AM meds. Stated \"I don't need them\". Med education provided, but patient continued to refuse. Dr. Kayla Chisholm present and aware. Reorientation and reassurance given. Encouraged to notify staff to process thoughts if needed. Discussed the benefits of inpatient treatment and programming expectations. Encouraged to attend groups and socialize with peers, but patient declines. Patient is meal compliant and remains free from harm. Safety maintained. Will continue to monitor and intervene as needed.    Goal: Ability to interact with others will improve  Ability to interact with others will improve   Outcome: Ongoing    Goal: Compliance with prescribed medication regimen will improve  Compliance with prescribed medication regimen will improve   Outcome: Ongoing

## 2018-09-27 NOTE — PROGRESS NOTES
Department of Psychiatry  Attending Progress Note  Chief Complaint: Schizophrenia [facility])     SUBJECTIVE:  Found him in his room. Isolating and withdrawing. Has thought blocking. This morning he refused to take medications as he thinks that he does not have schizophrenia. Affect remains flat and poorly reactive. Charting and medications reviewed. Therapeutic support provided. There is still no ability to formulate safe plan other than continued hospitalization.     OBJECTIVE    Physical  /80   Pulse 104   Temp 97.5 °F (36.4 °C) (Oral)   Resp 14   Ht 6' (1.829 m)   Wt 173 lb (78.5 kg)   SpO2 99%   BMI 23.46 kg/m²      Mental Status Evaluation:  Orientation: alertness: alert   Mood:. constricted and decreased range      Affect:  constricted and inappropriate      Appearance:  disheveled   Activity:  Within Normal Limits   Gait/Posture: Normal   Speech:  normal pitch, normal volume and soft   Thought Process:  circumstantial and loose associations   Thought Content:  delusions   Sensorium:  person, place and time/date   Cognition:  grossly intact   Memory: intact   Insight:  fair   Judgment: fair   Suicidal Ideations: passive   Homicidal Ideations: Negative for homicidal ideation      Medication Side Effects: absent       Attention Span attention span and concentration were age appropriate     Medications  Current Facility-Administered Medications   Medication Dose Route Frequency Provider Last Rate Last Dose    ARIPiprazole (ABILIFY) tablet 10 mg  10 mg Oral Daily Jeanette Peng MD   10 mg at 09/25/18 0826    ARIPiprazole lauroxil (ARISTADA) injection 441 mg  441 mg Intramuscular Q30 Days Jeanette Peng MD        busPIRone (BUSPAR) tablet 15 mg  15 mg Oral TID Jeanette Peng MD   15 mg at 09/25/18 0826    cetirizine (ZYRTEC) tablet 10 mg  10 mg Oral Daily Jeanette Peng MD   10 mg at 09/25/18 0827    therapeutic multivitamin-minerals 1 tablet  1 tablet Oral Daily Jeanette Peng MD   1 tablet at 09/24/18

## 2018-09-27 NOTE — PLAN OF CARE
Problem: Altered Mood, Psychotic Behavior:  Goal: Able to verbalize reality based thinking  Able to verbalize reality based thinking   Outcome: Ongoing  Pt did not participate in Cognitive Skills Therapeutic Group at 1100 despite staff encouragement.

## 2018-09-28 PROCEDURE — 1240000000 HC EMOTIONAL WELLNESS R&B

## 2018-09-28 NOTE — PLAN OF CARE
Problem: Altered Mood, Psychotic Behavior:  Goal: Able to verbalize reality based thinking  Able to verbalize reality based thinking   Outcome: Ongoing  Pt does answer majority of questions appropriately. He is selectively mute at time & frequently responds: \"I don't need that, I don't need to know my blood pressure, I don't need medicine\". Pt does allow vitals to be taken with much encouragement. He does not maintain a reality based conversation,  Answers are brief & few. Goal: Ability to interact with others will improve  Ability to interact with others will improve   Outcome: Met This Shift  Pt isolated to room all shift. Pt refused group attendance. Refused HS snack & refused his medicine. Does not elaborate on yes or no answers. Denies S/I, denies hallucinations, denies H/I. Pt remains labile. No significant changes in behaviors.   Goal: Compliance with prescribed medication regimen will improve  Compliance with prescribed medication regimen will improve   Outcome: Not Met This Shift  Pt refused his medicine, states: \"I don't need medicine\"

## 2018-09-28 NOTE — PLAN OF CARE
Problem: Altered Mood, Psychotic Behavior:  Goal: Able to verbalize reality based thinking  Able to verbalize reality based thinking   Outcome: Ongoing  Pt did not attend Community Meeting/Goal Setting skills group at 0845 d/t resting in room despite staff invitation to attend.

## 2018-09-29 PROCEDURE — 1240000000 HC EMOTIONAL WELLNESS R&B

## 2018-09-29 PROCEDURE — 99232 SBSQ HOSP IP/OBS MODERATE 35: CPT | Performed by: PSYCHIATRY & NEUROLOGY

## 2018-09-29 NOTE — PLAN OF CARE
Problem: Altered Mood, Psychotic Behavior:  Goal: Able to verbalize reality based thinking  Able to verbalize reality based thinking   Outcome: Ongoing  Pt did not attend community meeting and goal setting group at [phone] despite staff invitation to attend.

## 2018-09-30 PROCEDURE — 1240000000 HC EMOTIONAL WELLNESS R&B

## 2018-09-30 NOTE — PLAN OF CARE
Problem: Altered Mood, Psychotic Behavior:  Goal: Compliance with prescribed medication regimen will improve  Compliance with prescribed medication regimen will improve   Outcome: Ongoing  Pt refused HS medications this evening.

## 2018-09-30 NOTE — PLAN OF CARE
Problem: Altered Mood, Psychotic Behavior:  Goal: Ability to interact with others will improve  Ability to interact with others will improve   Outcome: Ongoing  Pt is aloof of peers at this time. Pt is isolative to self in room. Came to staff for needs.

## 2018-09-30 NOTE — PROGRESS NOTES
Department of Psychiatry  Attending Progress Note  Chief Complaint: Schizophrenia [facility])     SUBJECTIVE:    The patient has been refusing vital signs, physical exam and taking his medications. He is paranoid and believed that he was poisoned in the past.  He responds to internal stimuli at times and smiles without  A clear reason. Denied any visual or auditory hallucinations. Charting and medications reviewed. Therapeutic support provided. There is still no ability to formulate safe plan other than continued hospitalization.     OBJECTIVE    Physical  /81   Pulse 100   Temp 98.6 °F (37 °C)   Resp 14   Ht 6' (1.829 m)   Wt 173 lb (78.5 kg)   SpO2 99%   BMI 23.46 kg/m²      Mental Status Evaluation:  Orientation: alertness: alert   Mood:. constricted and decreased range      Affect:  constricted and inappropriate      Appearance:  disheveled   Activity:  Within Normal Limits   Gait/Posture: Normal   Speech:  normal pitch, normal volume and soft   Thought Process:  circumstantial and loose associations   Thought Content:  delusions   Sensorium:  person, place and time/date   Cognition:  grossly intact   Memory: intact   Insight:  fair   Judgment: fair   Suicidal Ideations: passive   Homicidal Ideations: Negative for homicidal ideation      Medication Side Effects: absent       Attention Span attention span and concentration were age appropriate     Medications  Current Facility-Administered Medications   Medication Dose Route Frequency Provider Last Rate Last Dose    ARIPiprazole lauroxil (ARISTADA) injection 882 mg  882 mg Intramuscular Once Kd Stoddard MD        busPIRone (BUSPAR) tablet 15 mg  15 mg Oral TID Tom Hooks MD   15 mg at 09/25/18 0826    cetirizine (ZYRTEC) tablet 10 mg  10 mg Oral Daily Tom Hooks MD   10 mg at 09/25/18 0827    therapeutic multivitamin-minerals 1 tablet  1 tablet Oral Daily Tom Hooks MD   1 tablet at 09/24/18 0822    propranolol (INDERAL) tablet 10 mg  10 mg Oral BID Leo Dumont MD   10 mg at 09/26/18 0825    acetaminophen (TYLENOL) tablet 650 mg  650 mg Oral Q4H PRN Cydne Manifold, APRN - CNP   650 mg at 09/24/18 2133    hydrOXYzine (ATARAX) tablet 50 mg  50 mg Oral TID PRN Cydne Manifold, APRN - CNP   50 mg at 09/23/18 2035    LORazepam (ATIVAN) injection 1 mg  1 mg Intramuscular Q4H PRN Cydne Manifold, APRN - CNP        traZODone (DESYREL) tablet 50 mg  50 mg Oral Nightly PRN Cydne Manifold, APRN - CNP   50 mg at 09/21/18 2034    magnesium hydroxide (MILK OF MAGNESIA) 400 MG/5ML suspension 30 mL  30 mL Oral Daily PRN Cydne Manifold, APRN - CNP        docusate sodium (COLACE) capsule 100 mg  100 mg Oral BID Cydne Manifold, APRN - CNP   100 mg at 09/25/18 0827    nicotine (NICODERM CQ) 21 MG/24HR 1 patch  1 patch Transdermal Daily Cydne Manifold, APRN - CNP   1 patch at 09/26/18 3817         ARIPiprazole lauroxil  882 mg Intramuscular Once    busPIRone  15 mg Oral TID    cetirizine  10 mg Oral Daily    therapeutic multivitamin-minerals  1 tablet Oral Daily    propranolol  10 mg Oral BID    docusate sodium  100 mg Oral BID    nicotine  1 patch Transdermal Daily       ASSESSMENT  Schizophrenia ([facility] [address]     Patient's Response to Treatment: Positive    PLAN  Encouraged to be complaint on medications, group psychotherapy, unit milieu and safety planning. We will administer restart him at a higher dose 882 mg every month. Last Yan Ina Bass was given on 9/6/18.   discharge planning when he is psychiatrically stable

## 2018-10-01 PROCEDURE — 99232 SBSQ HOSP IP/OBS MODERATE 35: CPT | Performed by: NURSE PRACTITIONER

## 2018-10-01 PROCEDURE — 1240000000 HC EMOTIONAL WELLNESS R&B

## 2018-10-01 NOTE — PLAN OF CARE
Problem: Altered Mood, Psychotic Behavior:  Goal: Able to verbalize reality based thinking  Able to verbalize reality based thinking   Outcome: Ongoing  Pt did not participate in Goal Setting and Community Meeting at [facility] despite staff encouragement.

## 2018-10-01 NOTE — PLAN OF CARE
Problem: Altered Mood, Psychotic Behavior:  Goal: Ability to interact with others will improve  Ability to interact with others will improve   Outcome: Ongoing  Safety ensured with 15 minute visual safety checks

## 2018-10-01 NOTE — PROGRESS NOTES
Charting done this shift reviewed by Electronically signed by Miguel Lizarraga RN on 9/30/2018 at 9:57 PM

## 2018-10-01 NOTE — PLAN OF CARE
Problem: Altered Mood, Psychotic Behavior:  Goal: Able to verbalize reality based thinking  Able to verbalize reality based thinking   Outcome: Ongoing  Pt did not attend Creative Expression/Self-Awareness skills group at 1100 d/t resting in room despite staff invitation to attend.

## 2018-10-01 NOTE — PLAN OF CARE
Problem: Altered Mood, Psychotic Behavior:  Goal: Able to verbalize reality based thinking  Able to verbalize reality based thinking   Outcome: Ongoing  Pt did not participate in Anger Management Therapeutic Group at 1330 despite staff encouragement.

## 2018-10-02 VITALS
WEIGHT: 173 LBS | HEART RATE: 102 BPM | TEMPERATURE: 98.3 F | RESPIRATION RATE: 14 BRPM | BODY MASS INDEX: 23.43 KG/M2 | SYSTOLIC BLOOD PRESSURE: 141 MMHG | OXYGEN SATURATION: 99 % | HEIGHT: 72 IN | DIASTOLIC BLOOD PRESSURE: 86 MMHG

## 2018-10-02 PROCEDURE — 1240000000 HC EMOTIONAL WELLNESS R&B

## 2018-10-02 PROCEDURE — 99232 SBSQ HOSP IP/OBS MODERATE 35: CPT | Performed by: REGISTERED NURSE

## 2018-10-02 NOTE — PLAN OF CARE
Problem: Altered Mood, Psychotic Behavior:  Goal: Ability to interact with others will improve  Ability to interact with others will improve   Outcome: Ongoing  Pt did not participate in Leisure Skills and Awareness / Christina Brid / Social Skills group at 1100 despite staff encouragement.

## 2018-10-02 NOTE — PROGRESS NOTES
Department of Psychiatry  Nurse Practitioner Progress Note  Chief Complaint: Schizophrenia [facility])     SUBJECTIVE:    Stephanie Pisano is seen in his room. He is isolative and flat today. He is suspicious. Continues with overt paranoia. Denies side effects. Continues to refuse all medications except Aristada. Has refused vital signs today. Responding to internal stimuli. Possible placement available on 10/3/2018. Charting and medications reviewed. Therapeutic support provided. There is still no ability to formulate safe plan other than continued hospitalization.     OBJECTIVE    Physical  BP (!) 141/86   Pulse 102   Temp 98.3 °F (36.8 °C) (Oral)   Resp 14   Ht 6' (1.829 m)   Wt 173 lb (78.5 kg)   SpO2 99%   BMI 23.46 kg/m²      Mental Status Evaluation:  Orientation: alertness: alert   Mood:. constricted and decreased range      Affect:  constricted and inappropriate      Appearance:  disheveled   Activity:  Within Normal Limits   Gait/Posture: Normal   Speech:  normal pitch, normal volume and soft   Thought Process:  circumstantial    Thought Content:  delusions   Sensorium:  person, place and time/date   Cognition:  grossly intact   Memory: intact   Insight:  fair   Judgment: fair   Suicidal Ideations: denied   Homicidal Ideations: Negative for homicidal ideation      Medication Side Effects: absent       Attention Span attention span and concentration were age appropriate     Medications  Current Facility-Administered Medications   Medication Dose Route Frequency Provider Last Rate Last Dose    busPIRone (BUSPAR) tablet 15 mg  15 mg Oral TID Silvia Blackmon MD   15 mg at 09/25/18 0826    cetirizine (ZYRTEC) tablet 10 mg  10 mg Oral Daily Silvia Blackmon MD   10 mg at 09/25/18 0827    therapeutic multivitamin-minerals 1 tablet  1 tablet Oral Daily Silvia Blackmon MD   1 tablet at 09/24/18 1149    propranolol (INDERAL) tablet 10 mg  10 mg Oral BID Silvia Blackmon MD   10 mg at 09/26/18 0825    acetaminophen (TYLENOL) tablet

## 2018-10-03 ENCOUNTER — APPOINTMENT (OUTPATIENT)
Dept: GENERAL RADIOLOGY | Age: 28
End: 2018-10-03
Payer: COMMERCIAL

## 2018-10-03 ENCOUNTER — HOSPITAL ENCOUNTER (OUTPATIENT)
Age: 28
Setting detail: OBSERVATION
Discharge: LEFT BEFORE DISCHARGE | End: 2018-10-04
Attending: EMERGENCY MEDICINE | Admitting: EMERGENCY MEDICINE
Payer: COMMERCIAL

## 2018-10-03 DIAGNOSIS — R07.9 CHEST PAIN, UNSPECIFIED TYPE: Primary | ICD-10-CM

## 2018-10-03 LAB
ABSOLUTE EOS #: 0.08 K/UL (ref 0–0.44)
ABSOLUTE IMMATURE GRANULOCYTE: 0.04 K/UL (ref 0–0.3)
ABSOLUTE LYMPH #: 3.61 K/UL (ref 1.1–3.7)
ABSOLUTE MONO #: 0.84 K/UL (ref 0.1–1.2)
ANION GAP SERPL CALCULATED.3IONS-SCNC: 18 MMOL/L (ref 9–17)
BASOPHILS # BLD: 1 % (ref 0–2)
BASOPHILS ABSOLUTE: 0.1 K/UL (ref 0–0.2)
BUN BLDV-MCNC: 9 MG/DL (ref 6–20)
BUN/CREAT BLD: ABNORMAL (ref 9–20)
CALCIUM SERPL-MCNC: 10 MG/DL (ref 8.6–10.4)
CHLORIDE BLD-SCNC: 98 MMOL/L (ref 98–107)
CO2: 24 MMOL/L (ref 20–31)
CREAT SERPL-MCNC: 0.86 MG/DL (ref 0.7–1.2)
DIFFERENTIAL TYPE: ABNORMAL
EOSINOPHILS RELATIVE PERCENT: 1 % (ref 1–4)
GFR AFRICAN AMERICAN: >60 ML/MIN
GFR NON-AFRICAN AMERICAN: >60 ML/MIN
GFR SERPL CREATININE-BSD FRML MDRD: ABNORMAL ML/MIN/{1.73_M2}
GFR SERPL CREATININE-BSD FRML MDRD: ABNORMAL ML/MIN/{1.73_M2}
GLUCOSE BLD-MCNC: 130 MG/DL (ref 70–99)
HCT VFR BLD CALC: 49.5 % (ref 40.7–50.3)
HEMOGLOBIN: 16.6 G/DL (ref 13–17)
IMMATURE GRANULOCYTES: 0 %
LYMPHOCYTES # BLD: 25 % (ref 24–43)
MCH RBC QN AUTO: 30.9 PG (ref 25.2–33.5)
MCHC RBC AUTO-ENTMCNC: 33.5 G/DL (ref 28.4–34.8)
MCV RBC AUTO: 92.2 FL (ref 82.6–102.9)
MONOCYTES # BLD: 6 % (ref 3–12)
NRBC AUTOMATED: 0.1 PER 100 WBC
PDW BLD-RTO: 13.3 % (ref 11.8–14.4)
PLATELET # BLD: 403 K/UL (ref 138–453)
PLATELET ESTIMATE: ABNORMAL
PMV BLD AUTO: 8.3 FL (ref 8.1–13.5)
POC TROPONIN I: 0 NG/ML (ref 0–0.1)
POC TROPONIN INTERP: NORMAL
POTASSIUM SERPL-SCNC: 3.6 MMOL/L (ref 3.7–5.3)
RBC # BLD: 5.37 M/UL (ref 4.21–5.77)
RBC # BLD: ABNORMAL 10*6/UL
SEG NEUTROPHILS: 67 % (ref 36–65)
SEGMENTED NEUTROPHILS ABSOLUTE COUNT: 10.02 K/UL (ref 1.5–8.1)
SODIUM BLD-SCNC: 140 MMOL/L (ref 135–144)
WBC # BLD: 14.7 K/UL (ref 3.5–11.3)
WBC # BLD: ABNORMAL 10*3/UL

## 2018-10-03 PROCEDURE — 5130000000 HC BRIDGE APPOINTMENT

## 2018-10-03 PROCEDURE — 99239 HOSP IP/OBS DSCHRG MGMT >30: CPT | Performed by: REGISTERED NURSE

## 2018-10-03 PROCEDURE — 96374 THER/PROPH/DIAG INJ IV PUSH: CPT

## 2018-10-03 PROCEDURE — 71046 X-RAY EXAM CHEST 2 VIEWS: CPT

## 2018-10-03 PROCEDURE — 93005 ELECTROCARDIOGRAM TRACING: CPT

## 2018-10-03 PROCEDURE — 6360000002 HC RX W HCPCS: Performed by: STUDENT IN AN ORGANIZED HEALTH CARE EDUCATION/TRAINING PROGRAM

## 2018-10-03 PROCEDURE — 80048 BASIC METABOLIC PNL TOTAL CA: CPT

## 2018-10-03 PROCEDURE — 85025 COMPLETE CBC W/AUTO DIFF WBC: CPT

## 2018-10-03 PROCEDURE — 2580000003 HC RX 258: Performed by: STUDENT IN AN ORGANIZED HEALTH CARE EDUCATION/TRAINING PROGRAM

## 2018-10-03 PROCEDURE — 99285 EMERGENCY DEPT VISIT HI MDM: CPT

## 2018-10-03 PROCEDURE — 84484 ASSAY OF TROPONIN QUANT: CPT

## 2018-10-03 RX ORDER — BUSPIRONE HYDROCHLORIDE 15 MG/1
15 TABLET ORAL 3 TIMES DAILY
Qty: 42 TABLET | Refills: 0 | Status: SHIPPED | OUTPATIENT
Start: 2018-10-03

## 2018-10-03 RX ORDER — LORAZEPAM 2 MG/ML
0.5 INJECTION INTRAMUSCULAR ONCE
Status: COMPLETED | OUTPATIENT
Start: 2018-10-03 | End: 2018-10-03

## 2018-10-03 RX ORDER — SODIUM CHLORIDE, SODIUM LACTATE, POTASSIUM CHLORIDE, AND CALCIUM CHLORIDE .6; .31; .03; .02 G/100ML; G/100ML; G/100ML; G/100ML
1000 INJECTION, SOLUTION INTRAVENOUS ONCE
Status: COMPLETED | OUTPATIENT
Start: 2018-10-03 | End: 2018-10-04

## 2018-10-03 RX ADMIN — SODIUM CHLORIDE, POTASSIUM CHLORIDE, SODIUM LACTATE AND CALCIUM CHLORIDE 1000 ML: 600; 310; 30; 20 INJECTION, SOLUTION INTRAVENOUS at 22:39

## 2018-10-03 RX ADMIN — LORAZEPAM 0.5 MG: 2 INJECTION INTRAMUSCULAR; INTRAVENOUS at 22:39

## 2018-10-03 ASSESSMENT — PAIN DESCRIPTION - LOCATION: LOCATION: CHEST

## 2018-10-03 ASSESSMENT — ENCOUNTER SYMPTOMS
COUGH: 0
VOMITING: 1
ABDOMINAL PAIN: 0
NAUSEA: 1
SHORTNESS OF BREATH: 0

## 2018-10-03 ASSESSMENT — PAIN DESCRIPTION - FREQUENCY: FREQUENCY: CONTINUOUS

## 2018-10-03 ASSESSMENT — PAIN SCALES - GENERAL: PAINLEVEL_OUTOF10: 7

## 2018-10-03 ASSESSMENT — PAIN DESCRIPTION - DESCRIPTORS: DESCRIPTORS: DISCOMFORT

## 2018-10-03 ASSESSMENT — PAIN DESCRIPTION - PAIN TYPE: TYPE: ACUTE PAIN

## 2018-10-03 ASSESSMENT — PAIN DESCRIPTION - ORIENTATION: ORIENTATION: LEFT;UPPER

## 2018-10-03 NOTE — BH NOTE
Pt at nursing desk cussing at staff and threatening staff that he will [address],\" pt non redirectable at this time, pt walking away from desk by himself.

## 2018-10-03 NOTE — CARE COORDINATION
Bridge Appointment completed: Reviewed Discharge Instructions with patient. Patient verbalizes understanding and agreement with the discharge plan using the teachback method. Discharge Arrangements: [place]ep[facility] : 10/4 @ 12:30 @ [address] notified: NA   Discharge destination/address: TBD_ pt will be picked up by ACT team Princess Madrigal and taken to [address] for placement on this date. Transported by:   Cassandra Arguelles.- [facility] ACT team

## 2018-10-03 NOTE — PLAN OF CARE
Problem: Altered Mood, Psychotic Behavior:  Goal: Ability to interact with others will improve  Ability to interact with others will improve   Outcome: Ongoing  Pt did not participate in Goal Setting and Patient Advisory Meeting at [address] [place] [address] despite staff encouragement.

## 2018-10-04 ENCOUNTER — HOSPITAL ENCOUNTER (EMERGENCY)
Age: 28
Discharge: HOME | End: 2018-10-04
Attending: EMERGENCY MEDICINE
Payer: COMMERCIAL

## 2018-10-04 ENCOUNTER — APPOINTMENT (OUTPATIENT)
Dept: NUCLEAR MEDICINE | Age: 28
End: 2018-10-04
Payer: COMMERCIAL

## 2018-10-04 VITALS
BODY MASS INDEX: 23.73 KG/M2 | DIASTOLIC BLOOD PRESSURE: 80 MMHG | SYSTOLIC BLOOD PRESSURE: 141 MMHG | HEART RATE: 78 BPM | TEMPERATURE: 98 F | HEIGHT: 72 IN | RESPIRATION RATE: 16 BRPM | OXYGEN SATURATION: 99 %

## 2018-10-04 VITALS
HEIGHT: 72 IN | WEIGHT: 175 LBS | OXYGEN SATURATION: 99 % | RESPIRATION RATE: 19 BRPM | SYSTOLIC BLOOD PRESSURE: 141 MMHG | TEMPERATURE: 97.8 F | DIASTOLIC BLOOD PRESSURE: 89 MMHG | HEART RATE: 74 BPM | BODY MASS INDEX: 23.7 KG/M2

## 2018-10-04 DIAGNOSIS — F20.0 PARANOID SCHIZOPHRENIA (HCC): Primary | ICD-10-CM

## 2018-10-04 PROBLEM — R07.9 CHEST PAIN: Status: ACTIVE | Noted: 2018-10-04

## 2018-10-04 LAB
EKG ATRIAL RATE: 100 BPM
EKG ATRIAL RATE: 125 BPM
EKG ATRIAL RATE: 91 BPM
EKG P AXIS: 55 DEGREES
EKG P AXIS: 61 DEGREES
EKG P AXIS: 71 DEGREES
EKG P-R INTERVAL: 138 MS
EKG P-R INTERVAL: 170 MS
EKG P-R INTERVAL: 190 MS
EKG Q-T INTERVAL: 290 MS
EKG Q-T INTERVAL: 324 MS
EKG Q-T INTERVAL: 336 MS
EKG QRS DURATION: 88 MS
EKG QRS DURATION: 94 MS
EKG QRS DURATION: 96 MS
EKG QTC CALCULATION (BAZETT): 413 MS
EKG QTC CALCULATION (BAZETT): 417 MS
EKG QTC CALCULATION (BAZETT): 418 MS
EKG R AXIS: 62 DEGREES
EKG R AXIS: 70 DEGREES
EKG R AXIS: 77 DEGREES
EKG T AXIS: -2 DEGREES
EKG T AXIS: 37 DEGREES
EKG T AXIS: 38 DEGREES
EKG VENTRICULAR RATE: 100 BPM
EKG VENTRICULAR RATE: 125 BPM
EKG VENTRICULAR RATE: 91 BPM

## 2018-10-04 PROCEDURE — G0378 HOSPITAL OBSERVATION PER HR: HCPCS

## 2018-10-04 PROCEDURE — 93005 ELECTROCARDIOGRAM TRACING: CPT

## 2018-10-04 PROCEDURE — 99285 EMERGENCY DEPT VISIT HI MDM: CPT

## 2018-10-04 RX ORDER — ONDANSETRON 2 MG/ML
4 INJECTION INTRAMUSCULAR; INTRAVENOUS EVERY 6 HOURS PRN
Status: DISCONTINUED | OUTPATIENT
Start: 2018-10-04 | End: 2018-10-04 | Stop reason: HOSPADM

## 2018-10-04 RX ORDER — SODIUM CHLORIDE 0.9 % (FLUSH) 0.9 %
10 SYRINGE (ML) INJECTION EVERY 12 HOURS SCHEDULED
Status: DISCONTINUED | OUTPATIENT
Start: 2018-10-04 | End: 2018-10-04 | Stop reason: HOSPADM

## 2018-10-04 RX ORDER — PROPRANOLOL HYDROCHLORIDE 10 MG/1
10 TABLET ORAL 2 TIMES DAILY
Status: DISCONTINUED | OUTPATIENT
Start: 2018-10-04 | End: 2018-10-04 | Stop reason: HOSPADM

## 2018-10-04 RX ORDER — BUSPIRONE HYDROCHLORIDE 15 MG/1
15 TABLET ORAL 3 TIMES DAILY
Status: DISCONTINUED | OUTPATIENT
Start: 2018-10-04 | End: 2018-10-04 | Stop reason: HOSPADM

## 2018-10-04 RX ORDER — METOPROLOL TARTRATE 5 MG/5ML
2.5 INJECTION INTRAVENOUS PRN
Status: ACTIVE | OUTPATIENT
Start: 2018-10-04 | End: 2018-10-04

## 2018-10-04 RX ORDER — SODIUM CHLORIDE 0.9 % (FLUSH) 0.9 %
10 SYRINGE (ML) INJECTION PRN
Status: DISCONTINUED | OUTPATIENT
Start: 2018-10-04 | End: 2018-10-04 | Stop reason: HOSPADM

## 2018-10-04 RX ORDER — SODIUM CHLORIDE 0.9 % (FLUSH) 0.9 %
10 SYRINGE (ML) INJECTION PRN
Status: ACTIVE | OUTPATIENT
Start: 2018-10-04 | End: 2018-10-04

## 2018-10-04 RX ORDER — NITROGLYCERIN 0.4 MG/1
0.4 TABLET SUBLINGUAL EVERY 5 MIN PRN
Status: ACTIVE | OUTPATIENT
Start: 2018-10-04 | End: 2018-10-04

## 2018-10-04 RX ORDER — SODIUM CHLORIDE 9 MG/ML
INJECTION, SOLUTION INTRAVENOUS ONCE
Status: DISCONTINUED | OUTPATIENT
Start: 2018-10-04 | End: 2018-10-04 | Stop reason: HOSPADM

## 2018-10-04 RX ORDER — M-VIT,TX,IRON,MINS/CALC/FOLIC 27MG-0.4MG
1 TABLET ORAL DAILY
Status: DISCONTINUED | OUTPATIENT
Start: 2018-10-04 | End: 2018-10-04 | Stop reason: HOSPADM

## 2018-10-04 ASSESSMENT — PAIN SCALES - GENERAL
PAINLEVEL_OUTOF10: 0
PAINLEVEL_OUTOF10: 0

## 2018-10-04 ASSESSMENT — HEART SCORE: ECG: 1

## 2018-10-04 NOTE — ED PROVIDER NOTES
surgical history on file. Social History     Social History    Marital status: Single     Spouse name: N/A    Number of children: N/A    Years of education: N/A     Occupational History    Not on file. Social History Main Topics    Smoking status: Current Every Day Smoker    Smokeless tobacco: Never Used      Comment: pt on nicotine patch    Alcohol use No    Drug use: Yes     Types: Marijuana    Sexual activity: Not Currently     Other Topics Concern    Not on file     Social History Narrative    No narrative on file     History reviewed. No pertinent family history. Allergies:  Patient has no known allergies. Home Medications:  Prior to Admission medications    Medication Sig Start Date End Date Taking? Authorizing Provider   busPIRone (BUSPAR) 15 MG tablet Take 15 mg by mouth 3 times daily 10/3/18   ROBERTO Ward - CNS   ARIPiprazole lauroxil (ARISTADA) 441 MG/1.6ML PRSY injection Inject 3.2 mLs into the muscle every 30 days 10/30/18   ROBERTO Braun   loratadine (ALAVERT) 10 MG tablet Take 10 mg by mouth daily    Historical Provider, MD   propranolol (INDERAL) 10 MG tablet Take 10 mg by mouth 2 times daily    Historical Provider, MD   Multiple Vitamins-Minerals (THERAPEUTIC MULTIVITAMIN-MINERALS) tablet Take 1 tablet by mouth daily    Historical Provider, MD     REVIEW OF SYSTEMS    (2-9 systems for level 4, 10 or more for level 5)      Review of Systems   Constitutional: Negative for chills and fever. Respiratory: Negative for cough and shortness of breath. Cardiovascular: Positive for chest pain. Negative for leg swelling. Gastrointestinal: Positive for nausea and vomiting. Negative for abdominal pain. Neurological: Positive for headaches (once several days ago, resolved). Psychiatric/Behavioral: Negative for hallucinations and suicidal ideas. The patient is nervous/anxious. Negative for homicidal ideation  Negative for drug use.      PHYSICAL EXAM   (up 12 Lead    EKG 12 Lead    EKG 12 Lead    PATIENT STATUS (FROM ED OR OR/PROCEDURAL) Observation     MEDICATIONS ORDERED:  Orders Placed This Encounter   Medications    lactated ringers bolus    LORazepam (ATIVAN) injection 0.5 mg    busPIRone (BUSPAR) tablet 15 mg    therapeutic multivitamin-minerals 1 tablet    propranolol (INDERAL) tablet 10 mg    sodium chloride flush 0.9 % injection 10 mL    sodium chloride flush 0.9 % injection 10 mL    magnesium hydroxide (MILK OF MAGNESIA) 400 MG/5ML suspension 30 mL    ondansetron (ZOFRAN) injection 4 mg    0.9 % sodium chloride infusion    sodium chloride flush 0.9 % injection 10 mL    metoprolol (LOPRESSOR) injection 2.5 mg    nitroGLYCERIN (NITROSTAT) SL tablet 0.4 mg     DDX: Chest Pain:  DDX: Emergent: ACS/NSTEMI/STEMI/angina, arrhythmia, trauma, aortic dissection,  PE, PNA, pneumothroax, esophageal rupture, tamponade, Cocaine use  Nonemergent: pneumonia, pericarditis, GERD, MSK, Endocarditis, anxiety  Evaluated for: diaphoresis, present chest pain, tachypnea, BP both arms, heart sounds, JVD, tender chest wall, wheezing    Initial MDM/Plan: 32 y.o. male who presents with subacute onset of chest pain that began 2 weeks ago. Patient has a very vague history of chest pain and appears anxious on examination. Heart was regular, however was tachycardic. Slightly hypertensive. EKG revealed sinus tach, with some T-wave inversions in the inferior leads. Will obtain CBC, BMP, troponin, chest x-ray, and EKG. We'll administer half milligram of Ativan and give IV fluids.     DIAGNOSTIC RESULTS / EMERGENCY DEPARTMENT COURSE / MDM     LABS:  Labs Reviewed   CBC WITH AUTO DIFFERENTIAL - Abnormal; Notable for the following:        Result Value    WBC 14.7 (*)     NRBC Automated 0.1 (*)     Seg Neutrophils 67 (*)     Segs Absolute 10.02 (*)     All other components within normal limits   BASIC METABOLIC PANEL - Abnormal; Notable for the following:     Glucose 130 (*) Potassium 3.6 (*)     Anion Gap 18 (*)     All other components within normal limits   TROPONIN   POCT TROPONIN   POCT TROPONIN   POCT TROPONIN       RADIOLOGY:  Xr Chest Standard (2 Vw)    Result Date: 10/3/2018  EXAMINATION: TWO VIEWS OF THE CHEST 10/3/2018 10:41 pm COMPARISON: None. HISTORY: ORDERING SYSTEM PROVIDED HISTORY: chest pain TECHNOLOGIST PROVIDED HISTORY: chest pain FINDINGS: Cardiac monitoring leads overlie the chest.  Heart is not enlarged. No pleural effusion, pneumothorax, or focal airspace consolidation. Osseous structures appear grossly intact. Negative chest.       Heart Score    Heart Score for chest pain patients  History: Moderately Suspicious  ECG: Non-Specifc repolarization disturbance/LBTB/PM  Patient Age: < 39 years  *Risk factors for Atherosclerotic disease: Cigarette smoking, Hypertension, Positive family History  Risk Factors: > 3 Risk factors or history of atherosclerotic disease*  Troponin: < 1X normal limit  Heart Score Total: 4    EKG Interpretation    Interpreted by me    Rhythm: Sinus tachycardia   Rate: 125 bpm  Axis: normal  Ectopy: none  Conduction: normal  ST Segments: no acute change  T Waves: T-wave inversion in leads III and aVF  Q Waves: none    Clinical Impression: Nonspecific EKG, none available for comparison. All EKG's are interpreted by the Emergency Department Physician who either signs or Co-signs this chart in the absence of a cardiologist.    EMERGENCY DEPARTMENT COURSE:  Patient evaluated by the attending physician and myself. Patient's heart rate improved with IV Ativan, however remained tachycardic throughout his emergency room stay. Wells criteria otherwise negative, and he denies any shortness of breath or pleuritic component to his chest pain. However, given the patient has been seen several times for chest pain recently, is persistently tachycardic, and has had HEART score of 4, will admit to observation unit for stress testing.  Patient in

## 2018-10-04 NOTE — ED NOTES
Pt. Resting on stretcher, eyes open, RR even and non-labored  Pt.  Updated on POC  Will continue to monitor        Montana Schmitt RN  10/04/18 7171

## 2018-10-04 NOTE — ED PROVIDER NOTES
[address] [facility]     Emergency Department     Faculty Attestation    I performed a history and physical examination of the patient and discussed management with the resident. I reviewed the residents note and agree with the documented findings and plan of care. Any areas of disagreement are noted on the chart. I was personally present for the key portions of any procedures. I have documented in the chart those procedures where I was not present during the key portions. I have reviewed the emergency nurses triage note. I agree with the chief complaint, past medical history, past surgical history, allergies, medications, social and family history as documented unless otherwise noted below. For Physician Assistant/ Nurse Practitioner cases/documentation I have personally evaluated this patient and have completed at least one if not all key elements of the E/M (history, physical exam, and MDM). Additional findings are as noted. Chest clear,  Heart is tachycardic regular rate and rhythm, no murmurs , no pain or swelling on examination of the lower extremities, trachea midline, no pain or swelling to palpation of the thyroid. Abdomen is nontender without pulsatile mass or bruit. Chest pain does not radiate to the back , and the pain is not pleuritic. Patient is pain-free at this time but states when he had the pain earlier was over the left anterior chest.  Patient appears comfortable in no distress, skin is warm and dry. The patient states that he did not take his propranolol for the past 2 days.     Ness Thompson MD [facility]  Attending Physician       EKG Interpretation    Interpreted by me    Rhythm: normal sinus   Rate: 125  Axis: normal 70  Ectopy: none  Conduction: normal  ST Segments: no acute change  T Waves: Inferior T-wave inversion  Q Waves: none    Clinical Impression: Abnormal EKG        Christain Duverney, MD  10/03/18 8007

## 2018-10-04 NOTE — ED PROVIDER NOTES
[address] ED  eMERGENCY dEPARTMENT eNCOUnter    Pt Name: Moe Booth  MRN: [id number]  [place] 1990  Date of evaluation: 10/4/18  CHIEF COMPLAINT       Chief Complaint   Patient presents with   [address] Problem     HISTORY OF PRESENT ILLNESS     Mental Health Problem   Presenting symptoms: bizarre behavior, delusional and paranoid behavior    Presenting symptoms: no aggressive behavior, no agitation, no depression, no disorganized speech, no disorganized thought process, no hallucinations, no homicidal ideas, no self-mutilation, no suicidal thoughts, no suicidal threats and no suicide attempt    Patient accompanied by: . Degree of incapacity (severity): Moderate  Onset quality:  Gradual  Duration:  4 months  Timing:  Constant  Progression:  Unchanged  Chronicity:  Chronic  Context: stressful life event    Context: not alcohol use    Context comment:  Was kicked out of group home, just [address] from [address] yesterday  Treatment compliance:  Untreated  Relieved by:  Nothing  Worsened by:  Nothing  Ineffective treatments:  Antipsychotics  Associated symptoms: anxiety    taking his meds, goes to [address] [place] [address], has Act team , Dr. Starla Hunt is psychiatrists    REVIEW OF SYSTEMS     Review of Systems   Psychiatric/Behavioral: Positive for dysphoric mood, paranoia and sleep disturbance. Negative for agitation, behavioral problems, confusion, decreased concentration, hallucinations, homicidal ideas, self-injury and suicidal ideas. The patient is nervous/anxious. The patient is not hyperactive. All other systems reviewed and are negative. PAST MEDICAL HISTORY     Past Medical History:   Diagnosis Date    Hypertension     Schizophrenia, schizo-affective ([facility] [address]      SURGICAL HISTORY     History reviewed. No pertinent surgical history.   CURRENT MEDICATIONS       Previous Medications    ARIPIPRAZOLE LAUROXIL (ARISTADA) 441 MG/1.6ML PRSY INJECTION    Inject 3.2 mLs into the DECISION MAKING:   Patient is a chronic baseline state. He is currently homeless, living at [facility]. His  from [employer] trying to get him placed in a new group home. He does not meet admission criteria. Patient is not acutely psychotic. He is compliant with medications. Compliant with follow-up.  dropped him off.  contacted his  now. Discussed with patient anticipatory guidance, discharge instructions, follow-up [facility] today. Procedures    DIAGNOSTIC RESULTS     EMERGENCY DEPARTMENT COURSE:   Vitals:    Vitals:    10/04/18 1135   BP: (!) 141/80   Pulse: 78   Resp: 16   Temp: 98 °F (36.7 °C)   SpO2: 99%   Height: 6' (1.829 m)         FINAL IMPRESSION      1. Paranoid schizophrenia [facility])          DISPOSITION/PLAN   DISPOSITION        PATIENT REFERRED TO:  [place] [address]  [phone]  Schedule an appointment as soon as possible for a visit today      Alejo Ortiz MD  Attending Emergency Physician  [employer] voice recognition software used in portions of this document.                     Alejo Ortiz MD  10/04/18 5936

## 2021-12-27 ENCOUNTER — HOSPITAL ENCOUNTER (EMERGENCY)
Age: 31
Discharge: HOME | End: 2021-12-27
Attending: EMERGENCY MEDICINE
Payer: COMMERCIAL

## 2021-12-27 VITALS
TEMPERATURE: 98.3 F | DIASTOLIC BLOOD PRESSURE: 108 MMHG | SYSTOLIC BLOOD PRESSURE: 164 MMHG | RESPIRATION RATE: 16 BRPM | HEART RATE: 148 BPM | OXYGEN SATURATION: 100 %

## 2021-12-27 DIAGNOSIS — F23 ACUTE PSYCHOSIS (HCC): Primary | ICD-10-CM

## 2021-12-27 PROCEDURE — 99282 EMERGENCY DEPT VISIT SF MDM: CPT

## 2021-12-27 ASSESSMENT — ENCOUNTER SYMPTOMS
COLOR CHANGE: 0
VOMITING: 0
SINUS PRESSURE: 0
CONSTIPATION: 0
BLOOD IN STOOL: 0
EYE REDNESS: 0
EYE DISCHARGE: 0
BACK PAIN: 0
DIARRHEA: 0
ABDOMINAL PAIN: 0
EYE PAIN: 0
TROUBLE SWALLOWING: 0
SHORTNESS OF BREATH: 0
NAUSEA: 0
RHINORRHEA: 0
COUGH: 0
FACIAL SWELLING: 0
WHEEZING: 0
CHEST TIGHTNESS: 0
SORE THROAT: 0
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
## 2022-03-31 ENCOUNTER — HOSPITAL ENCOUNTER (EMERGENCY)
Age: 32
Discharge: HOME | End: 2022-03-31
Attending: EMERGENCY MEDICINE
Payer: COMMERCIAL

## 2022-03-31 VITALS
DIASTOLIC BLOOD PRESSURE: 104 MMHG | BODY MASS INDEX: 23.06 KG/M2 | TEMPERATURE: 97.8 F | RESPIRATION RATE: 16 BRPM | OXYGEN SATURATION: 96 % | HEART RATE: 128 BPM | SYSTOLIC BLOOD PRESSURE: 158 MMHG | WEIGHT: 170 LBS

## 2022-03-31 DIAGNOSIS — F20.9 SCHIZOPHRENIA, UNSPECIFIED TYPE (HCC): Primary | ICD-10-CM

## 2022-03-31 LAB
ABSOLUTE EOS #: 0.34 K/UL (ref 0–0.44)
ABSOLUTE IMMATURE GRANULOCYTE: 0.03 K/UL (ref 0–0.3)
ABSOLUTE LYMPH #: 2.71 K/UL (ref 1.1–3.7)
ABSOLUTE MONO #: 0.94 K/UL (ref 0.1–1.2)
ANION GAP SERPL CALCULATED.3IONS-SCNC: 13 MMOL/L (ref 9–17)
BASOPHILS # BLD: 1 % (ref 0–2)
BASOPHILS ABSOLUTE: 0.11 K/UL (ref 0–0.2)
BUN BLDV-MCNC: 15 MG/DL (ref 6–20)
CALCIUM SERPL-MCNC: 9.8 MG/DL (ref 8.6–10.4)
CHLORIDE BLD-SCNC: 95 MMOL/L (ref 98–107)
CO2: 22 MMOL/L (ref 20–31)
CREAT SERPL-MCNC: 0.94 MG/DL (ref 0.7–1.2)
EOSINOPHILS RELATIVE PERCENT: 3 % (ref 1–4)
GFR AFRICAN AMERICAN: >60 ML/MIN
GFR NON-AFRICAN AMERICAN: >60 ML/MIN
GFR SERPL CREATININE-BSD FRML MDRD: ABNORMAL ML/MIN/{1.73_M2}
GLUCOSE BLD-MCNC: 98 MG/DL (ref 70–99)
HCT VFR BLD CALC: 48.6 % (ref 40.7–50.3)
HEMOGLOBIN: 16.5 G/DL (ref 13–17)
IMMATURE GRANULOCYTES: 0 %
LYMPHOCYTES # BLD: 23 % (ref 24–43)
MCH RBC QN AUTO: 31.2 PG (ref 25.2–33.5)
MCHC RBC AUTO-ENTMCNC: 34 G/DL (ref 28.4–34.8)
MCV RBC AUTO: 91.9 FL (ref 82.6–102.9)
MONOCYTES # BLD: 8 % (ref 3–12)
NRBC AUTOMATED: 0 PER 100 WBC
PDW BLD-RTO: 14.7 % (ref 11.8–14.4)
PLATELET # BLD: 338 K/UL (ref 138–453)
PMV BLD AUTO: 9 FL (ref 8.1–13.5)
POTASSIUM SERPL-SCNC: 4.6 MMOL/L (ref 3.7–5.3)
RBC # BLD: 5.29 M/UL (ref 4.21–5.77)
RBC # BLD: ABNORMAL 10*6/UL
SEG NEUTROPHILS: 65 % (ref 36–65)
SEGMENTED NEUTROPHILS ABSOLUTE COUNT: 7.44 K/UL (ref 1.5–8.1)
SODIUM BLD-SCNC: 130 MMOL/L (ref 135–144)
VALPROIC ACID LEVEL: 73 UG/ML (ref 50–125)
WBC # BLD: 11.6 K/UL (ref 3.5–11.3)

## 2022-03-31 PROCEDURE — 85025 COMPLETE CBC W/AUTO DIFF WBC: CPT

## 2022-03-31 PROCEDURE — 99285 EMERGENCY DEPT VISIT HI MDM: CPT

## 2022-03-31 PROCEDURE — 80048 BASIC METABOLIC PNL TOTAL CA: CPT

## 2022-03-31 PROCEDURE — 80164 ASSAY DIPROPYLACETIC ACD TOT: CPT

## 2022-03-31 NOTE — ED PROVIDER NOTES
[facility]  ED     Emergency Department     Faculty Attestation    I performed a history and physical examination of the patient and discussed management with the resident. I reviewed the residents note and agree with the documented findings and plan of care. Any areas of disagreement are noted on the chart. I was personally present for the key portions of any procedures. I have documented in the chart those procedures where I was not present during the key portions. I have reviewed the emergency nurses triage note. I agree with the chief complaint, past medical history, past surgical history, allergies, medications, social and family history as documented unless otherwise noted below. For Physician Assistant/ Nurse Practitioner cases/documentation I have personally evaluated this patient and have completed at least one if not all key elements of the E/M (history, physical exam, and MDM). Additional findings are as noted. Patient with history of schizophrenia presents stating that he is worried that he is having reactions to his medications. It is very difficult to get a history from the patient as he has significant flight of ideas. He is talking about getting laser treatments to his head as well as stories from when he was a young child. On exam, patient is sitting up on the bed and appears well. Lungs are clear to auscultation bilaterally. Heart sounds are mildly tachycardic but regular. Strength and sensation is intact to all extremities. Will check labs including Depakote level. Will reassess.       Marito Rivera MD  Attending Emergency  Physician              Mandi Cox MD  03/31/22 5037
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
## 2022-03-31 NOTE — ED NOTES
Patient requested housing resources, provided. [employer] and [address] information provided.      Pedro Luis [address], [address]  03/31/22 9117

## 2022-03-31 NOTE — ED TRIAGE NOTES
PT having scattered thoughts and states he hasnt been feeling right since he was started on valporic acid and states it has to deal with a laser operation that was done with his head.

## 2022-08-10 ENCOUNTER — HOSPITAL ENCOUNTER (EMERGENCY)
Age: 32
Discharge: HOME | End: 2022-08-10
Attending: EMERGENCY MEDICINE
Payer: COMMERCIAL

## 2022-08-10 VITALS
HEART RATE: 72 BPM | TEMPERATURE: 97.9 F | RESPIRATION RATE: 16 BRPM | SYSTOLIC BLOOD PRESSURE: 143 MMHG | DIASTOLIC BLOOD PRESSURE: 84 MMHG | OXYGEN SATURATION: 98 %

## 2022-08-10 DIAGNOSIS — F20.9 SCHIZOPHRENIA, UNSPECIFIED TYPE (HCC): Primary | ICD-10-CM

## 2022-08-10 LAB
ABSOLUTE EOS #: 0.4 K/UL (ref 0–0.44)
ABSOLUTE IMMATURE GRANULOCYTE: 0.05 K/UL (ref 0–0.3)
ABSOLUTE LYMPH #: 4.87 K/UL (ref 1.1–3.7)
ABSOLUTE MONO #: 0.96 K/UL (ref 0.1–1.2)
ACETAMINOPHEN LEVEL: <5 UG/ML (ref 10–30)
ALBUMIN SERPL-MCNC: 4.6 G/DL (ref 3.5–5.2)
ALBUMIN/GLOBULIN RATIO: 1.8 (ref 1–2.5)
ALP BLD-CCNC: 55 U/L (ref 40–129)
ALT SERPL-CCNC: 9 U/L (ref 5–41)
AMPHETAMINE SCREEN URINE: NEGATIVE
ANION GAP SERPL CALCULATED.3IONS-SCNC: 9 MMOL/L (ref 9–17)
AST SERPL-CCNC: 9 U/L
BARBITURATE SCREEN URINE: NEGATIVE
BASOPHILS # BLD: 1 % (ref 0–2)
BASOPHILS ABSOLUTE: 0.16 K/UL (ref 0–0.2)
BENZODIAZEPINE SCREEN, URINE: NEGATIVE
BILIRUB SERPL-MCNC: 0.16 MG/DL (ref 0.3–1.2)
BUN BLDV-MCNC: 7 MG/DL (ref 6–20)
CALCIUM SERPL-MCNC: 9.5 MG/DL (ref 8.6–10.4)
CANNABINOID SCREEN URINE: POSITIVE
CHLORIDE BLD-SCNC: 105 MMOL/L (ref 98–107)
CO2: 27 MMOL/L (ref 20–31)
COCAINE METABOLITE, URINE: NEGATIVE
CREAT SERPL-MCNC: 0.92 MG/DL (ref 0.7–1.2)
EOSINOPHILS RELATIVE PERCENT: 3 % (ref 1–4)
ETHANOL PERCENT: <0.01 %
ETHANOL: <10 MG/DL
FENTANYL URINE: NEGATIVE
GFR AFRICAN AMERICAN: >60 ML/MIN
GFR NON-AFRICAN AMERICAN: >60 ML/MIN
GFR SERPL CREATININE-BSD FRML MDRD: ABNORMAL ML/MIN/{1.73_M2}
GLUCOSE BLD-MCNC: 76 MG/DL (ref 70–99)
HCT VFR BLD CALC: 46.1 % (ref 40.7–50.3)
HEMOGLOBIN: 16 G/DL (ref 13–17)
IMMATURE GRANULOCYTES: 0 %
LYMPHOCYTES # BLD: 33 % (ref 24–43)
MCH RBC QN AUTO: 32.5 PG (ref 25.2–33.5)
MCHC RBC AUTO-ENTMCNC: 34.7 G/DL (ref 28.4–34.8)
MCV RBC AUTO: 93.5 FL (ref 82.6–102.9)
METHADONE SCREEN, URINE: NEGATIVE
MONOCYTES # BLD: 7 % (ref 3–12)
NRBC AUTOMATED: 0 PER 100 WBC
OPIATES, URINE: NEGATIVE
OXYCODONE SCREEN URINE: NEGATIVE
PDW BLD-RTO: 13.2 % (ref 11.8–14.4)
PHENCYCLIDINE, URINE: NEGATIVE
PLATELET # BLD: 320 K/UL (ref 138–453)
PMV BLD AUTO: 9 FL (ref 8.1–13.5)
POTASSIUM SERPL-SCNC: 3.8 MMOL/L (ref 3.7–5.3)
RBC # BLD: 4.93 M/UL (ref 4.21–5.77)
SALICYLATE LEVEL: <1 MG/DL (ref 3–10)
SEG NEUTROPHILS: 56 % (ref 36–65)
SEGMENTED NEUTROPHILS ABSOLUTE COUNT: 8.19 K/UL (ref 1.5–8.1)
SODIUM BLD-SCNC: 141 MMOL/L (ref 135–144)
TEST INFORMATION: ABNORMAL
TOTAL PROTEIN: 7.1 G/DL (ref 6.4–8.3)
TOXIC TRICYCLIC SC,BLOOD: NEGATIVE
WBC # BLD: 14.6 K/UL (ref 3.5–11.3)

## 2022-08-10 PROCEDURE — 85025 COMPLETE CBC W/AUTO DIFF WBC: CPT

## 2022-08-10 PROCEDURE — G0480 DRUG TEST DEF 1-7 CLASSES: HCPCS

## 2022-08-10 PROCEDURE — 80179 DRUG ASSAY SALICYLATE: CPT

## 2022-08-10 PROCEDURE — 80053 COMPREHEN METABOLIC PANEL: CPT

## 2022-08-10 PROCEDURE — 99283 EMERGENCY DEPT VISIT LOW MDM: CPT

## 2022-08-10 PROCEDURE — 80307 DRUG TEST PRSMV CHEM ANLYZR: CPT

## 2022-08-10 PROCEDURE — 80143 DRUG ASSAY ACETAMINOPHEN: CPT

## 2022-08-10 ASSESSMENT — ENCOUNTER SYMPTOMS
BACK PAIN: 0
ABDOMINAL PAIN: 0
SHORTNESS OF BREATH: 0
ANAL BLEEDING: 0
STRIDOR: 0

## 2022-08-10 ASSESSMENT — PAIN - FUNCTIONAL ASSESSMENT: PAIN_FUNCTIONAL_ASSESSMENT: NONE - DENIES PAIN

## 2022-08-10 NOTE — ED NOTES
Patient on stretcher with no complaints. POC updated.  Will continue to monitor        Radha Matt RN  08/10/22 6594

## 2022-08-10 NOTE — ED NOTES
SW spoke to [employer]. Guardian reports pt is worse then his baseline per symptoms being reported. Per Dr. Rohini Rogel pt is medically cleared by neurology.        KENYA Chamorro  08/10/22 4688

## 2022-08-10 NOTE — CONSULTS
[facility] Neurology   IN-PATIENT SERVICE    NEUROLOGY CONSULT  NOTE            Date:   8/10/2022  Patient name:  Cat Triana  Date of admission:  8/10/2022  YOB: 1990      Chief Complaint:     Chief Complaint   Patient presents with    Other     Central nervous system BLOCKAGE     Reason for Consult:      Neurology clearance for [facility] admission     History of Present Illness: The patient is a 33 y.o. male with history of schizophrenia, psychosis, delusions on aripiprazole injection, presented to the ED coming from a group home. It seems per reports that there was concern for delusions of implanted devices as well as contacts with a terrorist group. He denied suicidal or homicidal ideations. Unclear what his baseline is but during the interaction with the patient in the ED he clearly demonstrated flight of ideas with tangential speech. There was also bizarre delusions stating that he was involved in experiments back in the 90s. When asked if he has any headache or dizziness he went on talking about experiments with lasers and lights in the 90s. Upon entering his room in the ED, he was lying in bed awake alert and oriented. He was not in distress or agitated. he had a calm and a flat affect. He was cooperative. He was opening his eyes and was covering his body with a blanket. He responded to questions when asked but with flight of ideas. His vital signs were unremarkable with no fever, neurological examination was non-focal.  His basic labs with BMP, liver function, CBC were unremarkable. His UDS came back positive for cannabinoids    Past Medical History:     Past Medical History:   Diagnosis Date    Hypertension     Schizophrenia, schizo-affective ([address]         Past Surgical History:     History reviewed. No pertinent surgical history. Medications Prior to Admission:     Prior to Admission medications    Medication Sig Start Date End Date Taking?  Authorizing Provider   busPIRone (BUSPAR) 15 MG tablet Take 15 mg by mouth 3 times daily 10/3/18   Tejal MURPHY Josefina gallego, APRN - CNS   ARIPiprazole lauroxil (ARISTADA) 441 MG/1.6ML PRSY injection Inject 3.2 mLs into the muscle every 30 days 10/30/18   Oziel Joyce, APRN - CNS   loratadine (ALAVERT) 10 MG tablet Take 10 mg by mouth daily    Historical Provider, MD   propranolol (INDERAL) 10 MG tablet Take 10 mg by mouth 2 times daily    Historical Provider, MD   Multiple Vitamins-Minerals (THERAPEUTIC MULTIVITAMIN-MINERALS) tablet Take 1 tablet by mouth daily    Historical Provider, MD        Allergies:     Duloxetine and Tall ragweed    Social History:     Tobacco:    reports that he has been smoking. He has never used smokeless tobacco.  Alcohol:      reports no history of alcohol use. Drug Use:  reports current drug use. Drug: Marijuana Delona Members). Family History:     History reviewed. No pertinent family history. Review of Systems:       Constitutional Negative for fever and chills   HEENT Negative for ear discharge, ear pain, nosebleed   Eyes Negative for photophobia, pain and discharge   Respiratory Negative for hemoptysis and sputum   Cardiovascular Negative for orthopnea, claudication and PND   Gastrointestinal Negative for abdominal pain, diarrhea, blood in stool   Musculoskeletal Negative for joint pain, negative for myalgia   Skin Negative for rash or itching   hematology Negative for ecchymosis, anemia   Psychiatric Positive delusions       Physical Exam:   BP (!) 155/79   Pulse 86   Temp 98.1 °F (36.7 °C) (Oral)   Resp 16   SpO2 100%   Temp (24hrs), Av.1 °F (36.7 °C), Min:98.1 °F (36.7 °C), Max:98.1 °F (36.7 °C)         General Examination    General Resting comfortably in bed   Head Normocephalic, without obvious abnormality   Neck Supple, symmetrical. Good ROM. No midline or paraspinal tenderness. Lungs Respirations unlabored, no wheezing   Chest Wall No deformity   Heart RRR, no murmur   Abdomen Soft.  Non-tender, non-distended Extremities No cyanosis or edema or warmth. Pulses 2+ and symmetric   Skin: Skin  turgor normal, no rashes or lesions       Neurological examination:      Mental status   Alert and oriented x 3; following all commands;   speech is fluent, no dysarthria, aphasia. Abnormal thought content      Cranial nerves   II - visual fields intact to confrontation; pupils reactive  III, IV, VI - extraocular muscles intact; no JOSE E; no nystagmus; no ptosis   V - normal facial sensation                                                               VII - normal facial symmetry                                                             VIII - intact hearing                                                                             IX, X - symmetrical palate elevation                                               XI - symmetrical shoulder shrug                                                       XII - midline tongue without atrophy or fasciculation     Motor function  Strength:   5/5 RUE, 5/5 RLE  5/5 LUE, 5/5  LLE  Normal bulk and tone. Sensory function Intact to touch, pin, vibration, proprioception throughout     Cerebellar Intact finger-nose-finger testing. Intact heel-shin testing. No dysdiadochokinesia present. No tremors                        Reflex function 2/4 symmetric throughout . Downgoing plantar response bilaterally.  (-)Alonso's sign bilaterally      Gait                  Normal station and gait         Diagnostics:      Laboratory Testing:  CBC:   Recent Labs     08/10/22  0404   WBC 14.6*   HGB 16.0        BMP:    Recent Labs     08/10/22  0404      K 3.8      CO2 27   BUN 7   CREATININE 0.92   GLUCOSE 76         Lab Results   Component Value Date    CHOL 111 01/30/2020    LDLCHOLESTEROL 67 08/30/2018    HDL 31 (L) 01/30/2020    TRIG 190 (H) 01/30/2020    ALT 9 08/10/2022    AST 9 08/10/2022    TSH 1.71 08/30/2018    LABA1C 4.9 08/30/2018       Lab Results   Component Value Date VALPROATE 73 03/31/2022         Imaging/Diagnostics:  No results found. Assessment:      33 years old male patient with past hx of psychosis and schizophrenia . Presented on 8/10/2022 with delusions. Neurology were consulted for [facility] clearance. Plan:     No concerns for encephalitis  No concerns for seizures  None focal neurological exam  Patient is clear for [facility] admission from neurological stand point. Case will be discussed with attending. Thank you for your consultation.      Electronically signed by Graciela Colorado MD on 8/10/2022 at 6:50 AM      Electronically signed by   Graciela Colorado MD  8/10/2022  6:50 AM

## 2022-08-10 NOTE — ED NOTES
SW reviewed case with Dr. Velia Fxo and [facility] RN, Africa Rosario, after patient cleared by Neurology. Patient consistently not voicing SI/HI, is active with [facility] appointments, and resides in a group home so plan is for discharge back to the group home. Lorrie Hercules, , is good with this plan as well. SW suggested that due to the fact that this is patient's baseline behavior, with a schizophrenia diagnosis, a better option for patient is [address] [place] [address] for crisis stabilization in the future and Radha is in agreement. Duane Flowers, [address] at [facility], also updated and will meet with patient today. SW let patient's guardian, Duane Flowers, know status of discharge back to the group home by cab. RN and patient updated on plan and Attending, Dr. Caroline Moscoso, also agreeable to discharge with a safety plan in place. Vickie Holcomb.  Rufina Engle, [facility]  08/10/22 3409

## 2022-08-10 NOTE — ED NOTES
Provisional Diagnosis:   Acute Psychosis      Psychosocial and Contextual Factors:   Lives in a group home, reports unsure if he can return       C-SSRS Summary:    Patient: X  Family:   Agency:    Present Suicidal Behavior:    Verbal: Denied    Attempt:Denied    Past Suicidal Behavior:    Verbal: Denied    Attempt: Denied    Self-Injurious/Self-Mutilation: Denied       Protective Factors:  Pt has insurance, pt linked with [facility]      Risk Factors:  Compliancy with taking MH medications as prescribed unknown    Clinical Summary:  Pt is a 23year old male who presents to the ED after reporting he called 911 due to receiving 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 Pt appears with mild paranoia, scattered thoughts, and is fixated on health concerns. He reports prior diagnosis of unspecified schizophrenia, but states only because unspecified psychotic disorder was not in the DSM-5 handbook. Pt reports he takes Abilify and is linked with [address]. Pt is stating that his medication was recently changed from Zyprexa to Abilify. Per [facility] worker pt was last seen on 7/20/22 and was provided an Abilify shot, but she was not able to see if there was a recent change. He reports the group home he stays at is [facility]. SW called [facility] with pt's permission and was provided with phone numbers [phone], and [phone] [facility] and Luis Jerome of possible contacts at the group home. SW called all three numbers and was unable to connect with anyone. Pt admits to daily marijuana use, but denied use of other drugs except alcohol six months ago. Pts drug screen was clear today except for marijuana.  Pt reports prior affiliation with the [employer] but states he was removed from the [employer] due to them questioning his mental health, which he states President Leonel Clark. wrote him a letter telling him that if he can behave for 30 days he would be able to stay in the [employer]. Pt reports he is autistic. Pt appeared to have dirt or possibly feces covered on his arms and in his fingernails. Pt said he has not showered in a few days. Pt denies suicidal and homicidal ideation. Pt does endorse some visual hallucinations that he describes as seeing \"white noise\" in the light panels, but states he has had them since his brain surgery in 1993. Per [facility] pt has a legal guardian [facility] [phone], but worker was unsure if this is still correct. KATYA saw that in 2019 [facility] pt was appointed a guardian and ruled as incompetent. SW called and left message with guardian. Patient reports he does not feel he can keep himself safe. Pt does not want to return to group home due to him stating one of the residents is part of the [facility] or possibly [facility]. Pt has prior legal issues, but denies having current legal concerns. Pt would be voluntary and wants to go inpaitent to address his concerns, however pt will need to be pink slipped if guardian is unable to be reached. Level of Care Disposition:  KATYA collaborated with Dr. Shaneka Diego and due to recent change in medication, prior Attending Doctor concerns, and pt feeling he is unable to keep himself safe SW will contact on call psychiatrist for psychiatric disposition.        Shira Thornton, [facility]  08/10/22 [address], [facility]  08/10/22 [address] [facility], [facility]  08/10/22 [address], [facility]  08/10/22 [address] [facility], [facility]  08/10/22 [address], [facility]  08/10/22 4480

## 2022-08-10 NOTE — ED NOTES
Patient on stretcher with no complaints. POC updated.  Will continue to monitor        Ed Holley RN  08/10/22 4413

## 2022-08-10 NOTE — DISCHARGE INSTRUCTIONS
Continue to follow-up with [facility] for your medication.   You have worsening tremors, gait abnormality, please return to emergency room as soon as possible

## 2022-08-10 NOTE — ED PROVIDER NOTES
Care of Jorge Veloz was assumed from previous attending and is being seen for Other (Central nervous system BLOCKAGE)  . The patient's initial evaluation and plan have been discussed with the prior provider who initially evaluated the patient. Handoff taken on the following patient from prior Attending Physician:    [address]    I was available and discussed any additional care issues that arose and coordinated the management plans with the resident(s) caring for the patient during my duty period. Any areas of disagreement with residents documentation of care or procedures are noted on the chart. I was personally present for the key portions of any/all procedures during my duty period. I have documented in the chart those procedures where I was not present during the key portions. EMERGENCY DEPARTMENT COURSE / MEDICAL DECISION MAKING:       MEDICATIONS GIVEN:  No orders of the defined types were placed in this encounter. LABS / RADIOLOGY:     Labs Reviewed   COMPREHENSIVE METABOLIC PANEL - Abnormal; Notable for the following components:       Result Value    Total Bilirubin 0.16 (*)     All other components within normal limits   CBC WITH AUTO DIFFERENTIAL - Abnormal; Notable for the following components:    WBC 14.6 (*)     Segs Absolute 8.19 (*)     Absolute Lymph # 4.87 (*)     All other components within normal limits   TOX SCR, BLD, ED - Abnormal; Notable for the following components:    Acetaminophen Level <5 (*)     Salicylate Lvl <1 (*)     All other components within normal limits   URINE DRUG SCREEN - Abnormal; Notable for the following components:    Cannabinoid Scrn, Ur POSITIVE (*)     All other components within normal limits       No results found. RECENT VITALS:     Temp: 97.9 °F (36.6 °C),  Heart Rate: 72, Resp: 16, BP: (!) 143/84, SpO2: 98 %    This patient is a 33 y.o. Male with schizophrenia, concern for need for psychiatric evaluation.   Patient evaluated by psychiatrist.  Patient initially stated he did not feel safe at his group home. He denies having any mental health illnesses. He is well-established at a group home managed by staff. Is on medications. Is able to return to his group home. Which patient is agreeable to at this time. No other concerns he denies suicidal ideation. He does exhibit some paranoia. However does not appear to be a danger to himself or others. Social work evaluated patient extensively, and in agreement will cab back to group home.     OUTSTANDING TASKS / RECOMMENDATIONS:    reassess      Hemant Mota DO, DO  Attending Emergency Physician  [facility] ED            La Avlies DO  08/10/22 1029

## 2022-08-10 NOTE — ED NOTES
KATYA consulted with Doris Chen and Per Dr. Ewa Garza he will not consider pt for psychiatric inpatient admission until pt has been cleared from neurology and possible neurological symptoms have been ruled out.       KENYA Monroe  08/10/22 4027

## 2022-08-10 NOTE — ED PROVIDER NOTES
[facility] ED  Emergency Department Encounter  Emergency Medicine Resident     Pt Name: Elda Mary  MRN: [id number]  Radhagfkevan 1990  Date of evaluation: 8/10/22  PCP:  No primary care provider on file. CHIEF COMPLAINT       Chief Complaint   Patient presents with    Other     Central nervous system BLOCKAGE       HISTORY OFPRESENT ILLNESS  (Location/Symptom, Timing/Onset, Context/Setting, Quality, Duration, Modifying Factors,Severity.)      Elda Mary is a 33 y. o.yo male who presents with central nervous system blockade causing him to have tremors. Of note patient has a history of schizophrenia and schizoaffective disorder and reports that he takes his medication as prescribed. He does get all his medication from staff center. Patient states that right now he does not have a place to stay because he does not feel comfortable in his group home. Patient otherwise states that he is not homicidal or suicidal.  He denies any chest pain or shortness of breath or abdominal pain or nausea and vomiting. Denies any gait abnormalities, numbness or tingling in upper and lower extremity. PAST MEDICAL / SURGICAL / SOCIAL / FAMILY HISTORY      has a past medical history of Hypertension and Schizophrenia, schizo-affective ([facility] [address] has no past surgical history on file.      Social History     Socioeconomic History    Marital status: Single     Spouse name: Not on file    Number of children: Not on file    Years of education: Not on file    Highest education level: Not on file   Occupational History    Not on file   Tobacco Use    Smoking status: Every Day    Smokeless tobacco: Never    Tobacco comments:     pt on nicotine patch   Vaping Use    Vaping Use: Never used   Substance and Sexual Activity    Alcohol use: No    Drug use: Yes     Types: Marijuana Charmayne Stai)    Sexual activity: Not Currently   Other Topics Concern    Not on file   Social History Narrative    Not on file     Social Determinants of Health     Financial Resource Strain: Not on file   Food Insecurity: Not on file   Transportation Needs: Not on file   Physical Activity: Not on file   Stress: Not on file   Social Connections: Not on file   Intimate Partner Violence: Not on file   Housing Stability: Not on file       History reviewed. No pertinent family history. Allergies:  Duloxetine and Tall ragweed    Home Medications:  Prior to Admission medications    Medication Sig Start Date End Date Taking? Authorizing Provider   busPIRone (BUSPAR) 15 MG tablet Take 15 mg by mouth 3 times daily 10/3/18   ROBERTO Borrero - CNS   ARIPiprazole lauroxil (ARISTADA) 441 MG/1.6ML PRSY injection Inject 3.2 mLs into the muscle every 30 days 10/30/18   ROBERTO Richardson   loratadine (ALAVERT) 10 MG tablet Take 10 mg by mouth daily    Historical Provider, MD   propranolol (INDERAL) 10 MG tablet Take 10 mg by mouth 2 times daily    Historical Provider, MD   Multiple Vitamins-Minerals (THERAPEUTIC MULTIVITAMIN-MINERALS) tablet Take 1 tablet by mouth daily    Historical Provider, MD       REVIEW OFSYSTEMS    (2-9 systems for level 4, 10 or more for level 5)      Review of Systems   Constitutional:  Negative for fatigue and fever. HENT:  Negative for congestion. Eyes:  Negative for visual disturbance. Respiratory:  Negative for shortness of breath and stridor. Cardiovascular:  Negative for chest pain and leg swelling. Gastrointestinal:  Negative for abdominal pain and anal bleeding. Genitourinary:  Negative for flank pain. Musculoskeletal:  Negative for back pain. Neurological:  Positive for tremors. Baseline since child hold   Psychiatric/Behavioral:  Negative for behavioral problems, confusion, sleep disturbance and suicidal ideas.       PHYSICAL EXAM   (up to 7 for level 4, 8 or more forlevel 5)      INITIAL VITALS:   ED Triage Vitals [08/10/22 0053]   BP Temp Temp Source Heart Rate Resp SpO2 Height Weight   (!) 144/89 98.1 °F (36.7 °C) Oral 99 18 99 % -- --       Physical Exam  Constitutional:       Appearance: Normal appearance. HENT:      Head: Normocephalic and atraumatic. Nose: Nose normal.      Mouth/Throat:      Mouth: Mucous membranes are moist.   Eyes:      Extraocular Movements: Extraocular movements intact. Pupils: Pupils are equal, round, and reactive to light. Cardiovascular:      Rate and Rhythm: Normal rate. Pulmonary:      Effort: Pulmonary effort is normal. No respiratory distress. Abdominal:      General: There is no distension. Palpations: Abdomen is soft. Tenderness: There is no abdominal tenderness. Musculoskeletal:         General: No swelling or tenderness. Cervical back: No rigidity or tenderness. Skin:     General: Skin is warm. Capillary Refill: Capillary refill takes less than 2 seconds. Coloration: Skin is not jaundiced. Neurological:      General: No focal deficit present. Mental Status: He is alert and oriented to person, place, and time. Motor: No weakness. Gait: Gait normal.   Psychiatric:      Comments: Patient with delusional thoughts, tangential speech       DIFFERENTIAL  DIAGNOSIS     PLAN (LABS / IMAGING / EKG):  Orders Placed This Encounter   Procedures    Comprehensive Metabolic Panel    CBC with Auto Differential    TOX SCR, BLD, ED    Urine Drug Screen    Inpatient consult to Social Work    Inpatient consult to Neurology       MEDICATIONS ORDERED:  No orders of the defined types were placed in this encounter. Initial MDM/Plan: 33 y.o. male who presents with tremors.    Patient with tangential thoughts with delusional thoughts, no thoughts of suicide and homicidal.  Will have  for evaluation and care  Patient given box of sandwich  DIAGNOSTIC RESULTS / EMERGENCYDEPARTMENT COURSE / MDM     LABS:  Labs Reviewed   COMPREHENSIVE METABOLIC PANEL - Abnormal; Notable for the following components:       Result Value    Total Bilirubin 0.16 (*)     All other components within normal limits   CBC WITH AUTO DIFFERENTIAL - Abnormal; Notable for the following components:    WBC 14.6 (*)     Segs Absolute 8.19 (*)     Absolute Lymph # 4.87 (*)     All other components within normal limits   TOX SCR, BLD, ED - Abnormal; Notable for the following components:    Acetaminophen Level <5 (*)     Salicylate Lvl <1 (*)     All other components within normal limits   URINE DRUG SCREEN - Abnormal; Notable for the following components:    Cannabinoid Scrn, Ur POSITIVE (*)     All other components within normal limits         RADIOLOGY:  No results found. EKG      All EKG's are interpreted by the Emergency Department Physicianwho either signs or Co-signs this chart in the absence of a cardiologist.    EMERGENCY DEPARTMENT COURSE:  ED Course as of 08/10/22 1743   Wed Aug 10, 2022   2078 Unable to get a hold of patient group home or any other group home per the number was given from [facility]. Due to patient delusional thoughts and tangential worse, will will place him in [facility]. [facility] labs ordered [AN]   0535 Patient is medically clear for transfer to [facility]. [GG]      ED Course User Index  [AN] Pili Hicks MD  [GG] Yesenia Mills MD          PROCEDURES:  None    CONSULTS:  IP CONSULT TO SOCIAL WORK  IP CONSULT TO NEUROLOGY    CRITICAL CARE:      FINAL IMPRESSION      1.  Schizophrenia, unspecified type [facility])          DISPOSITION / PLAN     DISPOSITION Decision To Discharge 08/10/2022 10:19:16 AM      PATIENT REFERRED TO:  [facility] ED  [address] [address]  [phone]        DISCHARGE MEDICATIONS:  Discharge Medication List as of 8/10/2022 10:22 AM          Pili Hicks MD  Emergency Medicine Resident    (Please note that portions of this note were completed with a voice recognition program.Efforts were made to edit the dictations but occasionally words are mis-transcribed.)       Assumpta Campos Dupont MD  Resident  08/10/22 9213

## 2022-08-10 NOTE — ED NOTES
SW called [facility] and left message to initiate possible behavioral health admission.       Abhinav Dejesus, KENYA  08/10/22 8143

## 2022-08-10 NOTE — ED NOTES
Group home leader Radha called back and reported that pt may need medication adjusted and he does not always take his medication as he should. Group home leader reported she was unaware on why pt came to ED. KATYA spoke with Oral Devan who reports being the group home leader and Karl Carey gave verbal permission  for us to speak to Oral Devan or Ansley Merlos about pt when he comes in. Registration notified.       Read Never, KENYA  08/10/22 8365

## 2022-08-10 NOTE — ED NOTES
Patient on stretcher with no complaints. POC updated.  Will continue to monitor        Hedy Munoz RN  08/10/22 8688

## 2022-08-10 NOTE — ED PROVIDER NOTES
[facility] ED  Emergency Department  Emergency Medicine Resident Sign-out     Care of Maddie Graham was assumed from Dr. Candelario Mcdonald and is being seen for Other (Central nervous system BLOCKAGE)  . The patient's initial evaluation and plan have been discussed with the prior provider who initially evaluated the patient. EMERGENCY DEPARTMENT COURSE / MEDICAL DECISION MAKING:       MEDICATIONS GIVEN:  No orders of the defined types were placed in this encounter. LABS / RADIOLOGY:     Labs Reviewed   COMPREHENSIVE METABOLIC PANEL - Abnormal; Notable for the following components:       Result Value    Total Bilirubin 0.16 (*)     All other components within normal limits   CBC WITH AUTO DIFFERENTIAL - Abnormal; Notable for the following components:    WBC 14.6 (*)     Segs Absolute 8.19 (*)     Absolute Lymph # 4.87 (*)     All other components within normal limits   TOX SCR, BLD, ED - Abnormal; Notable for the following components:    Acetaminophen Level <5 (*)     Salicylate Lvl <1 (*)     All other components within normal limits   URINE DRUG SCREEN - Abnormal; Notable for the following components:    Cannabinoid Scrn, Ur POSITIVE (*)     All other components within normal limits       No results found. RECENT VITALS:     Temp: 97.9 °F (36.6 °C),  Heart Rate: 72, Resp: 16, BP: (!) 143/84, SpO2: 98 %      This patient is a 33 y.o. Male with history of schizophrenia schizoaffective disorder compliant with medication. Get medication from [facility]. Stays in a group home where he does not feel safe. Patient with tangential, delusional.  Unable to get patient to group home or any other group home. Plan is to get [facility] labs, get patient transferred or admitted to [facility] once labs have resulted for delusional thoughts. Patient denies SI and HI.       ED Course as of 08/11/22 0737   Wed Aug 10, 2022   3630 Unable to get a hold of patient group home or any other group home per the number was given from [facility]. Due to patient delusional thoughts and tangential worse, will will place him in [facility]. [facility] labs ordered [AN]   0535 Patient is medically clear for transfer to [facility].  [GG]      ED Course User Index  [AN] Mckenzie Calvo MD  [GG] Juan Covington MD       OUTSTANDING TASKS / RECOMMENDATIONS:    F/u Labs  Transfer to [facility]     FINAL IMPRESSION:     1. Schizophrenia, unspecified type ([facility] [address]        DISPOSITION:         DISPOSITION:  []  Discharge   [x]  Transfer - [facility]   []  Admission -     []  Against Medical Advice   []  Eloped   FOLLOW-UP: [facility] ED  [address] [place] [address]  [phone]       DISCHARGE MEDICATIONS: Discharge Medication List as of 8/10/2022 10:22 AM             Juan Covington MD  Emergency Medicine Resident  Kyle Devine 1893        Juan Covington MD  Resident  08/11/22 9660

## 2022-08-10 NOTE — ED PROVIDER NOTES
[facility]     Emergency Department     Faculty Note/ Attestation      Pt Name: Kelly Alfaro                                       MRN: [id number]  [place] 1990  Date of evaluation: 8/10/2022    Patients PCP:    No primary care provider on file. Attestation  I performed a history and physical examination of the patient and discussed management with the resident. I reviewed the residents note and agree with the documented findings and plan of care. Any areas of disagreement are noted on the chart. I was personally present for the key portions of any procedures. I have documented in the chart those procedures where I was not present during the key portions. I have reviewed the emergency nurses triage note. I agree with the chief complaint, past medical history, past surgical history, allergies, medications, social and family history as documented unless otherwise noted below. For Physician Assistant/ Nurse Practitioner cases/documentation I have personally evaluated this patient and have completed at least one if not all key elements of the E/M (history, physical exam, and MDM). Additional findings are as noted.       Initial Screens:        [place] Coma Scale  Eye Opening: Spontaneous  Best Verbal Response: Oriented  Best Motor Response: Obeys commands  Kartik Coma Scale Score: 15    Vitals:    Vitals:    08/10/22 0053   BP: (!) 144/89   Pulse: 99   Resp: 18   Temp: 98.1 °F (36.7 °C)   TempSrc: Oral   SpO2: 99%       CHIEF COMPLAINT       Chief Complaint   Patient presents with    Other     Central nervous system BLOCKAGE             DIAGNOSTIC RESULTS             RADIOLOGY:   No orders to display         LABS:  Labs Reviewed - No data to display      EMERGENCY DEPARTMENT COURSE:     -------------------------  BP: (!) 144/89, Temp: 98.1 °F (36.7 °C), Heart Rate: 99, Resp: 18      Comments    Patient states he lives in a group home, recently had his meds changed on 20

## 2022-09-08 NOTE — PLAN OF CARE
Problem: Altered Mood, Psychotic Behavior:  Goal: Able to verbalize reality based thinking  Able to verbalize reality based thinking   Outcome: Ongoing  Patient refused am medications stating \"i get a shot now\"  You radha are crazy  Writer encouraged patient to attend unit programming to learn new coping skills  Patient denies thoughts of self harm  seclusive to self and room out for meals and needs only - - -

## 2022-09-22 ENCOUNTER — HOSPITAL ENCOUNTER (EMERGENCY)
Age: 32
Discharge: HOME | End: 2022-09-22
Attending: EMERGENCY MEDICINE
Payer: COMMERCIAL

## 2022-09-22 VITALS
DIASTOLIC BLOOD PRESSURE: 85 MMHG | HEART RATE: 90 BPM | OXYGEN SATURATION: 100 % | RESPIRATION RATE: 16 BRPM | TEMPERATURE: 97 F | SYSTOLIC BLOOD PRESSURE: 143 MMHG

## 2022-09-22 DIAGNOSIS — F20.0 PARANOID SCHIZOPHRENIA (HCC): Primary | ICD-10-CM

## 2022-09-22 PROCEDURE — 99282 EMERGENCY DEPT VISIT SF MDM: CPT

## 2022-09-22 ASSESSMENT — ENCOUNTER SYMPTOMS
NAUSEA: 0
ABDOMINAL PAIN: 0
SHORTNESS OF BREATH: 0
VOMITING: 0
CHEST TIGHTNESS: 0
ABDOMINAL DISTENTION: 0
BACK PAIN: 0

## 2022-09-22 NOTE — ED NOTES
Pt to ED for evaluation, reports he thinks he was poisoned by terrorists. Pt reports he took his valproic acid with coffee this morning, states he felt sedated and that he suspects someone \"messed with my medications. \" Pt states he thinks the effects of medication has now worn off. Pt appears very paranoid, denies any suicidal or homicidal ideations. Patient alert and oriented x4, talking in complete sentences. Respirations even and unlabored.  All needs met at this time        Ninetta Cooks, RN  09/22/22 8561

## 2022-09-22 NOTE — ED NOTES
Writer received call from patient's Zepf  Rachel Yao who stated he spoke with Darlene Hercules, patient's , who stated patient had no problems in the home this morning. Jose Yeboah stated he also spoke with patient's guardian, Deisy Hester, & discussed patient's presentation in ED. Jose Yeboah stated he's going to stop by patient's home now to confirm patient's whereabouts & safety. Jose Yeboah stated he takes patient to his [facility] appointments, confirmed patient's next appointment 9/30/22.       LUCA Saeed  09/22/22 1527

## 2022-09-22 NOTE — ED PROVIDER NOTES
[facility] ED     Emergency Department     Faculty Attestation        I performed a history and physical examination of the patient and discussed management with the resident. I reviewed the residents note and agree with the documented findings and plan of care. Any areas of disagreement are noted on the chart. I was personally present for the key portions of any procedures. I have documented in the chart those procedures where I was not present during the key portions. I have reviewed the emergency nurses triage note. I agree with the chief complaint, past medical history, past surgical history, allergies, medications, social and family history as documented unless otherwise noted below. For Physician Assistant/ Nurse Practitioner cases/documentation I have personally evaluated this patient and have completed at least one if not all key elements of the E/M (history, physical exam, and MDM). Additional findings are as noted. Vital Signs: BP: (!) 143/85  Heart Rate: 90  Resp: 16  Temp: 97 °F (36.1 °C) SpO2: 100 %  PCP:  No primary care provider on file. Pertinent Comments:     Patient is a 64-year-old male with longstanding history of schizophrenia taking his medications who earlier this morning is feeling more paranoid than normal towards one of the group home members. Is actually taken his Depakote with coffee this morning and now that is \"kicked in\" he no longer feels that way but wish to talk to some people here. No homicidal or suicidal ideation. Clearly a mild psychosis occurring with some mild to moderate paranoia, however this is improving since earlier this morning so the patient. Denies any other complaints such as chest pain, shortness of breath, abdominal pain, nausea/vomiting/diarrhea. Assessment/plan: Patient with baseline schizophrenia here for brief evaluation but no need for institutionalization at this time. Critical Care  None      (Please note that portions of this note were completed with a voice recognition program. Efforts were made to edit the dictations but occasionally words are mis-transcribed.  Whenever words are used in this note in any gender, they shall be construed as though they were used in the gender appropriate to the circumstances; and whenever words are used in this note in the singular or plural form, they shall be construed as though they were used in the form appropriate to the circumstances.)    Sintia Brown MD Katie Even  Attending Emergency Medicine Physician           Kristen Becerra MD  09/22/22 4845

## 2022-09-22 NOTE — ED NOTES
Writer relayed Eliezer's recommendation to patient who requested transport to the [facility]. While patient was waiting in the ED [facility], writer received call from patient's guardian Jolanta Bridges. Writer relayed patient's upcoming [facility] appointment information & request for transport to [facility]. [place] stated patient does reside in a [facility] group home. Pat attempted unsuccessfully to contact \[facility]\"", . Pat requested writer not assist patient with transport to [facility] as patient has a [facility] group home & is linked with mental health resources in [facility]. Writer to meet with patient in [facility] & strongly encourage patient to return to group [place]. Writer met with patient outside of ED [facility], informed him writer is unable to arrange transport to [facility]. Patient stated he had to leave the group home of his own request & it's not safe for him to return home. Patient stated he will contact his [facility]  for assistance & denied additional needs. Writer twice attempted to contact patient's guardian Bartlett Phu, phone call will not go through.         Hazel Villa, LISW  09/22/22 Yesenia, LISW  09/22/22 9029

## 2022-09-22 NOTE — DISCHARGE INSTRUCTIONS
Seen in the emergency department today due to concern for being poisoned. Please follow-up with your mental health facility. Please take your medications as prescribed and return with any new questions or concerns.

## 2022-09-22 NOTE — ED NOTES
Patient's very pleasant but clearly delusional which is his baseline. Patient denies SI/HI, is agreeable to discharge. Patient requests return to [facility]. Patient displays flight of ideas, delusions about interactions with al-qaeda & relates  history. Writer spoke with Candie Estrella of [employer] who stated patient's not one of their clients. Writer spoke with April of [facility] who stated patient's guardian is Aiden Hollins ([phone]), writer left voicemail requesting return call. April stated patient's  is Radha ([phone]), writer left voicemail requesting return call. April stated patient's [facility]  is Fawn Larson ([phone] ), writer left voicemail requesting return call. Writer contacted [address] [place] [address], spoke with Ria gM who stated they won't take out of [facility] referrals & he's not legally able to confirm/deny a person's residential status. Ria Mg stated patient's best option is to present to their shelter to conduct an intake application. Writer to inform patient.       LUCA Addison  09/22/22 1123

## 2022-09-22 NOTE — ED PROVIDER NOTES
[facility] ED  Emergency Department Encounter  EmergencyMedicine Resident     Pt Name:Anthony Roman  MRN: [id number]  [place] 1990  Date of evaluation: 9/22/22  PCP:  No primary care provider on file. CHIEF COMPLAINT       Chief Complaint   Patient presents with    Psychiatric Evaluation       HISTORY OF PRESENT ILLNESS  (Location/Symptom, Timing/Onset, Context/Setting, Quality, Duration, Modifying Factors, Severity.)      Rosmery Matos is a 33 y.o. male who presents with concerns of being poisoned by a fellow resident home. Patient has a history of schizophrenia and paranoid thoughts. States that he has worked with the [employer], is concerned that the other resident is a member of the [employer] group and has poisoned his liquid medications and coffee. Patient took his regular dose of medication and drank a different kind of coffee this morning than he normally gets. Since then he felt like his brain was blocked. Denies any suicidal ideation or homicidal ideation. Is calm and cooperative. PAST MEDICAL / SURGICAL / SOCIAL / FAMILY HISTORY      has a past medical history of Hypertension and Schizophrenia, schizo-affective ([address] has no past surgical history on file.       Social History     Socioeconomic History    Marital status: Single     Spouse name: Not on file    Number of children: Not on file    Years of education: Not on file    Highest education level: Not on file   Occupational History    Not on file   Tobacco Use    Smoking status: Every Day    Smokeless tobacco: Never    Tobacco comments:     pt on nicotine patch   Vaping Use    Vaping Use: Never used   Substance and Sexual Activity    Alcohol use: No    Drug use: Yes     Types: Marijuana Pallavi Yeyo)    Sexual activity: Not Currently   Other Topics Concern    Not on file   Social History Narrative    Not on file     Social Determinants of Health     Financial Resource Strain: Not on file   Food Insecurity: Not on file   Transportation Needs: Not on file   Physical Activity: Not on file   Stress: Not on file   Social Connections: Not on file   Intimate Partner Violence: Not on file   Housing Stability: Not on file       No family history on file. Allergies:  Duloxetine and Tall ragweed    Home Medications:  Prior to Admission medications    Medication Sig Start Date End Date Taking? Authorizing Provider   busPIRone (BUSPAR) 15 MG tablet Take 15 mg by mouth 3 times daily 10/3/18   ROBERTO Soto - CNS   ARIPiprazole lauroxil (ARISTADA) 441 MG/1.6ML PRSY injection Inject 3.2 mLs into the muscle every 30 days 10/30/18   ROBERTO Fung   loratadine (ALAVERT) 10 MG tablet Take 10 mg by mouth daily    Historical Provider, MD   propranolol (INDERAL) 10 MG tablet Take 10 mg by mouth 2 times daily    Historical Provider, MD   Multiple Vitamins-Minerals (THERAPEUTIC MULTIVITAMIN-MINERALS) tablet Take 1 tablet by mouth daily    Historical Provider, MD       REVIEW OF SYSTEMS    (2-9 systems for level 4, 10 or more for level 5)      Review of Systems   Constitutional:  Negative for activity change, appetite change, chills and fever. Eyes:  Negative for visual disturbance. Respiratory:  Negative for chest tightness and shortness of breath. Cardiovascular:  Negative for chest pain. Gastrointestinal:  Negative for abdominal distention, abdominal pain, nausea and vomiting. Genitourinary:  Negative for difficulty urinating. Musculoskeletal:  Negative for back pain and neck stiffness. Neurological:  Negative for headaches. PHYSICAL EXAM   (up to 7 for level 4, 8 or more for level 5)      INITIAL VITALS:   BP (!) 143/85   Pulse 90   Temp 97 °F (36.1 °C) (Oral)   Resp 16   SpO2 100%     Physical Exam  Constitutional:       General: He is awake. Appearance: Normal appearance. HENT:      Head: Normocephalic and atraumatic.       Right Ear: External ear normal.      Left Ear: External ear normal.      Nose: Nose normal.   Eyes:      General: Lids are normal.      Extraocular Movements: Extraocular movements intact. Cardiovascular:      Rate and Rhythm: Normal rate. Pulses: Normal pulses. Heart sounds: Normal heart sounds. Pulmonary:      Effort: Pulmonary effort is normal.      Breath sounds: Normal breath sounds. Musculoskeletal:      Cervical back: Normal range of motion. Comments: Normal range of motion, strength and sensation intact in all extremities. Neurological:      Mental Status: He is alert and oriented to person, place, and time. Sensory: Sensation is intact. Motor: Motor function is intact. Psychiatric:         Mood and Affect: Mood normal.         Behavior: Behavior is cooperative. DIFFERENTIAL  DIAGNOSIS     PLAN (LABS / IMAGING / EKG):  No orders of the defined types were placed in this encounter. MEDICATIONS ORDERED:  No orders of the defined types were placed in this encounter. DDX: Schizophrenia, paranoid thoughts, delusions    MDM: 33 y.o. male presents today with is being poisoned. Patient's vital signs are within normal limits, he is calm and cooperative. Denies any suicidal ideation or homicidal ideation. We will have social work speak to the patient. Will discharge patient to group home in [address] [place] [address] where he is from. Kartik Coma Scale  Eye Opening: Spontaneous  Best Verbal Response: Oriented  Best Motor Response: Obeys commands  Kartik Coma Scale Score: 15  DIAGNOSTIC RESULTS / EMERGENCY DEPARTMENT COURSE / MDM   LAB RESULTS:  No results found for this visit on 09/22/22. RADIOLOGY:  No orders to display      EMERGENCY DEPARTMENT COURSE:        PROCEDURES:  None    CONSULTS:  None    CRITICAL CARE:  None    FINAL IMPRESSION      1.  Paranoid schizophrenia ([facility]          DISPOSITION / PLAN     DISPOSITION Decision To Discharge 09/22/2022 11:41:15 AM      PATIENT REFERRED TO:  [address] [place] [address] AT [address] [place] [id number] [phone]        DISCHARGE MEDICATIONS:  Discharge Medication List as of 9/22/2022 11:42 AM          Ten Bill MD  Emergency Medicine Resident    (Please note that portions of thisnote were completed with a voice recognition program.  Efforts were made to edit the dictations but occasionally words are mis-transcribed.)       Ten Bill MD  Resident  09/22/22 4892

## 2022-11-18 ENCOUNTER — HOSPITAL ENCOUNTER (EMERGENCY)
Age: 32
Discharge: HOME | End: 2022-11-18
Attending: EMERGENCY MEDICINE
Payer: COMMERCIAL

## 2022-11-18 VITALS
HEIGHT: 72 IN | WEIGHT: 163 LBS | DIASTOLIC BLOOD PRESSURE: 94 MMHG | RESPIRATION RATE: 18 BRPM | TEMPERATURE: 97 F | SYSTOLIC BLOOD PRESSURE: 158 MMHG | HEART RATE: 113 BPM | BODY MASS INDEX: 22.08 KG/M2 | OXYGEN SATURATION: 99 %

## 2022-11-18 DIAGNOSIS — Z71.1 NO PROBLEM, FEARED COMPLAINT UNFOUNDED: Primary | ICD-10-CM

## 2022-11-18 LAB — VALPROIC ACID LEVEL: 41 UG/ML (ref 50–125)

## 2022-11-18 PROCEDURE — 99284 EMERGENCY DEPT VISIT MOD MDM: CPT

## 2022-11-18 PROCEDURE — 93005 ELECTROCARDIOGRAM TRACING: CPT

## 2022-11-18 PROCEDURE — 80164 ASSAY DIPROPYLACETIC ACD TOT: CPT

## 2022-11-18 ASSESSMENT — ENCOUNTER SYMPTOMS
TROUBLE SWALLOWING: 0
ABDOMINAL DISTENTION: 0
SORE THROAT: 0
CHEST TIGHTNESS: 0
WHEEZING: 0
ABDOMINAL PAIN: 0
CONSTIPATION: 0
VOICE CHANGE: 0
BACK PAIN: 0
SHORTNESS OF BREATH: 0
NAUSEA: 0
RHINORRHEA: 0
PHOTOPHOBIA: 0
DIARRHEA: 0
COUGH: 0
VOMITING: 0

## 2022-11-18 ASSESSMENT — LIFESTYLE VARIABLES
HOW MANY STANDARD DRINKS CONTAINING ALCOHOL DO YOU HAVE ON A TYPICAL DAY: PATIENT DOES NOT DRINK
HOW OFTEN DO YOU HAVE A DRINK CONTAINING ALCOHOL: NEVER

## 2022-11-18 ASSESSMENT — PAIN - FUNCTIONAL ASSESSMENT: PAIN_FUNCTIONAL_ASSESSMENT: NONE - DENIES PAIN

## 2022-11-18 NOTE — ED NOTES
Writer set up transportation for patient through his insurance at discharge to the homeless [facility] [place] [place].   Trip number [id number]     LUCA Kessler  11/18/22 4128

## 2022-11-18 NOTE — ED PROVIDER NOTES
[facility]  ED  Emergency Department Encounter  Emergency Medicine Resident     Pt Name:Anthony Alan  MRN: [id number]  Radhagfkevan 1990  Date of evaluation: 11/18/22  PCP:  No primary care provider on file. CHIEF COMPLAINT       Chief Complaint   Patient presents with    Other     Thinks hes receiving too much valporic acid and zoloft at assisted living facility        HISTORY OF PRESENT ILLNESS  (Location/Symptom, Timing/Onset, Context/Setting, Quality, Duration, Modifying Factors, Severity.)      Rosa Phillip is a 27 y.o. male who presents with concerns that he is receiving too much of his medications. Patient has a past medical history of paranoid schizophrenia and lives at an assisted living facility. Patient states that over the past 2 days the people care for him at the facility has been giving him excessive amounts of valproic acid and Zoloft. He states that he sees them squirt too much of the medication's or just every morning. He states that he has not had any symptoms however does state that \"the electrode in his brain is going off and causing weird feelings\". Patient denies chest pain, shortness of breath, abdominal pain, nausea, vomiting, diarrhea, weakness, or any other concerns. PAST MEDICAL / SURGICAL / SOCIAL / FAMILY HISTORY      has a past medical history of Hypertension and Schizophrenia, schizo-affective ([address] has no past surgical history on file.       Social History     Socioeconomic History    Marital status: Single     Spouse name: Not on file    Number of children: Not on file    Years of education: Not on file    Highest education level: Not on file   Occupational History    Not on file   Tobacco Use    Smoking status: Every Day    Smokeless tobacco: Never    Tobacco comments:     pt on nicotine patch   Vaping Use    Vaping Use: Never used   Substance and Sexual Activity    Alcohol use: No    Drug use: Yes     Types: Marijuana Easton Shoemaker) Sexual activity: Not Currently   Other Topics Concern    Not on file   Social History Narrative    Not on file     Social Determinants of Health     Financial Resource Strain: Not on file   Food Insecurity: Not on file   Transportation Needs: Not on file   Physical Activity: Not on file   Stress: Not on file   Social Connections: Not on file   Intimate Partner Violence: Not on file   Housing Stability: Not on file       History reviewed. No pertinent family history. Allergies:  Duloxetine and Tall ragweed    Home Medications:  Prior to Admission medications    Medication Sig Start Date End Date Taking? Authorizing Provider   busPIRone (BUSPAR) 15 MG tablet Take 15 mg by mouth 3 times daily 10/3/18   ROBERTO Guo - CNS   ARIPiprazole lauroxil (ARISTADA) 441 MG/1.6ML PRSY injection Inject 3.2 mLs into the muscle every 30 days 10/30/18   ROBERTO Garcia   loratadine (ALAVERT) 10 MG tablet Take 10 mg by mouth daily    Historical Provider, MD   propranolol (INDERAL) 10 MG tablet Take 10 mg by mouth 2 times daily    Historical Provider, MD   Multiple Vitamins-Minerals (THERAPEUTIC MULTIVITAMIN-MINERALS) tablet Take 1 tablet by mouth daily    Historical Provider, MD       REVIEW OF SYSTEMS    (2-9 systems for level 4, 10 or more for level 5)      Review of Systems   Constitutional:  Negative for chills and fever. HENT:  Negative for congestion, rhinorrhea, sore throat, trouble swallowing and voice change. Eyes:  Negative for photophobia and visual disturbance. Respiratory:  Negative for cough, chest tightness, shortness of breath and wheezing. Cardiovascular:  Negative for chest pain and palpitations. Gastrointestinal:  Negative for abdominal distention, abdominal pain, constipation, diarrhea, nausea and vomiting. Genitourinary:  Negative for dysuria, flank pain and frequency. Musculoskeletal:  Negative for arthralgias, back pain, myalgias and neck pain. Skin:  Negative for wound. Neurological:  Negative for dizziness, syncope, weakness, light-headedness, numbness and headaches. Psychiatric/Behavioral:  Negative for agitation and confusion. PHYSICAL EXAM   (up to 7 for level 4, 8 or more for level 5)      INITIAL VITALS:   BP (!) 158/94   Pulse (!) 113   Temp 97 °F (36.1 °C) (Oral)   Resp 18   Ht 6' (1.829 m)   Wt 163 lb (73.9 kg)   SpO2 99%   BMI 22.11 kg/m²     Physical Exam  Constitutional:       Appearance: Normal appearance. He is normal weight. HENT:      Head: Normocephalic and atraumatic. Nose: Nose normal.      Mouth/Throat:      Mouth: Mucous membranes are moist.      Pharynx: Oropharynx is clear. Eyes:      Extraocular Movements: Extraocular movements intact. Conjunctiva/sclera: Conjunctivae normal.      Pupils: Pupils are equal, round, and reactive to light. Cardiovascular:      Rate and Rhythm: Normal rate and regular rhythm. Pulses: Normal pulses. Heart sounds: Normal heart sounds. Pulmonary:      Effort: Pulmonary effort is normal.      Breath sounds: Normal breath sounds. Abdominal:      General: Abdomen is flat. Palpations: Abdomen is soft. Tenderness: There is no abdominal tenderness. Musculoskeletal:         General: No tenderness. Normal range of motion. Cervical back: Normal range of motion. No tenderness. Skin:     General: Skin is warm and dry. Capillary Refill: Capillary refill takes less than 2 seconds. Neurological:      General: No focal deficit present. Mental Status: He is alert and oriented to person, place, and time. Mental status is at baseline. Psychiatric:         Behavior: Behavior normal.       DIFFERENTIAL  DIAGNOSIS     PLAN (LABS / IMAGING / EKG):  Orders Placed This Encounter   Procedures    Valproic Acid Level, Total    EKG 12 Lead       MEDICATIONS ORDERED:  No orders of the defined types were placed in this encounter.       DDX: Zoloft overdose, valproic acid overdose    MDM: Patient is a 58-year-old male with past medical history of paranoid schizophrenia who presents with concerns of overdosing on Zoloft and valproic acid. States that the people care for him in his home been intentionally giving him too much medication. Patient is GCS 15, nontoxic-appearing no acute stress, speaking full sentences, able to ambulate under his own power. Patient denies any symptoms at this time. We will obtain EKG to investigate for QT prolongation and check valproic acid levels. If those are within normal limits we will discharge patient home with return precautions, follow-up with PCP. DIAGNOSTIC RESULTS / EMERGENCY DEPARTMENT COURSE / MDM   LAB RESULTS:  Results for orders placed or performed during the hospital encounter of 11/18/22   Valproic Acid Level, Total   Result Value Ref Range    Valproic Acid Lvl 41 (L) 50 - 125 ug/mL         RADIOLOGY:  No orders to display         EKG  EKG Interpretation    Interpreted by me    Rhythm: normal sinus tachycardia  Rate: 106  Axis: normal  Ectopy: none  Conduction: QT/Qtc 318/422, QRS 80  ST Segments: normal, no acute infarction evident  T Waves: normal  Q Waves: none    EKG Interpretation:  Sinus tachycardia      All EKG's are interpreted by the Emergency Department Physician who either signs or Co-signs this chart in the absence of a cardiologist.    EMERGENCY DEPARTMENT COURSE:      ED Course as of 11/18/22 1801 Fri Nov 18, 2022   1758 Valproic Acid Level, Total(!):    Valproic Acid Lvl 41(!)  Valproic acid low. EKG shows sinus tachycardia without prolonged QTC. Will discharge patient home with return precautions, follow-up with PCP and reassurance. [AK]      ED Course User Index  [AK] Darrell Velazquez MD       No notes of [facility] Admission Criteria type on file. PROCEDURES:  None    CONSULTS:  None    CRITICAL CARE:  None      FINAL IMPRESSION      1.  No problem, feared complaint unfounded          DISPOSITION / PLAN DISPOSITION Decision To Discharge 11/18/2022 05:58:46 PM      PATIENT REFERRED TO:  [address] [address] [phone]  Schedule an appointment as soon as possible for a visit in 1 day      DISCHARGE MEDICATIONS:  New Prescriptions    No medications on file       Raudel Pederson MD  Emergency Medicine Resident    (Please note that portions of thisnote were completed with a voice recognition program.  Efforts were made to edit the dictations but occasionally words are mis-transcribed.)       Raudel Pederson MD  Resident  11/18/22 0142

## 2022-11-18 NOTE — ED PROVIDER NOTES
[address] [facility]     Emergency Department     Faculty Attestation    I performed a history and physical examination of the patient and discussed management with the resident. I reviewed the resident´s note and agree with the documented findings and plan of care. Any areas of disagreement are noted on the chart. I was personally present for the key portions of any procedures. I have documented in the chart those procedures where I was not present during the key portions. I have reviewed the emergency nurses triage note. I agree with the chief complaint, past medical history, past surgical history, allergies, medications, social and family history as documented unless otherwise noted below. For Physician Assistant/ Nurse Practitioner cases/documentation I have personally evaluated this patient and have completed at least one if not all key elements of the E/M (history, physical exam, and MDM). Additional findings are as noted. Awake alert and oriented, no focal neurodeficits, no ataxia or nystagmus, cerebellar testing normal.  Heart exam normal, no respiratory distress.      Barbara Miller MD  11/18/22 7275       EKG Interpretation    Interpreted by emergency department physician    Rhythm: normal sinus   Rate: 106  Axis: normal  Ectopy: none  Conduction: normal  ST Segments: no acute change  T Waves: no acute change  Q Waves: none    Clinical Impression: Normal EKG except for sinus tachycardia    Barbara Miller, III       Barbara Miller MD  11/18/22 8895

## 2022-11-18 NOTE — DISCHARGE INSTRUCTIONS
You are seen today in the emergency department for concerns that your receiving too much medication. We got an EKG and levels of air valproic acid which were low. We feel you are safe for discharge home. Please return emerged department if you develop signs or symptoms including headache, weakness, chest pain, shortness of breath, abdominal pain, nausea, vomiting, diarrhea, weakness, or any other concerns. Please call the family practice, schedule appointment as a new patient within next 24 to 48 hours.

## 2022-11-22 LAB
EKG ATRIAL RATE: 106 BPM
EKG P AXIS: 67 DEGREES
EKG P-R INTERVAL: 148 MS
EKG Q-T INTERVAL: 318 MS
EKG QRS DURATION: 80 MS
EKG QTC CALCULATION (BAZETT): 422 MS
EKG R AXIS: 83 DEGREES
EKG T AXIS: 58 DEGREES
EKG VENTRICULAR RATE: 106 BPM

## 2023-02-08 ENCOUNTER — HOSPITAL ENCOUNTER (EMERGENCY)
Age: 33
Discharge: PSYCHIATRIC OR SUBSTANCE USE FACILITY | End: 2023-02-08
Attending: EMERGENCY MEDICINE
Payer: COMMERCIAL

## 2023-02-08 ENCOUNTER — HOSPITAL ENCOUNTER (INPATIENT)
Age: 33
LOS: 8 days | Discharge: HOME | End: 2023-02-16
Attending: PSYCHIATRY & NEUROLOGY | Admitting: PSYCHIATRY & NEUROLOGY
Payer: COMMERCIAL

## 2023-02-08 VITALS
BODY MASS INDEX: 22.4 KG/M2 | HEART RATE: 86 BPM | DIASTOLIC BLOOD PRESSURE: 69 MMHG | SYSTOLIC BLOOD PRESSURE: 132 MMHG | HEIGHT: 71 IN | TEMPERATURE: 98.2 F | WEIGHT: 160 LBS | OXYGEN SATURATION: 98 % | RESPIRATION RATE: 16 BRPM

## 2023-02-08 DIAGNOSIS — F22 DELUSIONS (HCC): Primary | ICD-10-CM

## 2023-02-08 PROBLEM — F23 ACUTE PSYCHOSIS (HCC): Status: ACTIVE | Noted: 2023-02-08

## 2023-02-08 LAB
ABSOLUTE EOS #: 0.29 K/UL (ref 0–0.44)
ABSOLUTE IMMATURE GRANULOCYTE: 0.05 K/UL (ref 0–0.3)
ABSOLUTE LYMPH #: 3.18 K/UL (ref 1.1–3.7)
ABSOLUTE MONO #: 0.83 K/UL (ref 0.1–1.2)
ACETAMINOPHEN LEVEL: <5 UG/ML (ref 10–30)
ALBUMIN SERPL-MCNC: 4.6 G/DL (ref 3.5–5.2)
ALBUMIN/GLOBULIN RATIO: 1.5 (ref 1–2.5)
ALP SERPL-CCNC: 69 U/L (ref 40–129)
ALT SERPL-CCNC: 17 U/L (ref 5–41)
AMPHETAMINE SCREEN URINE: NEGATIVE
ANION GAP SERPL CALCULATED.3IONS-SCNC: 9 MMOL/L (ref 9–17)
AST SERPL-CCNC: 15 U/L
BARBITURATE SCREEN URINE: NEGATIVE
BASOPHILS # BLD: 1 % (ref 0–2)
BASOPHILS ABSOLUTE: 0.08 K/UL (ref 0–0.2)
BENZODIAZEPINE SCREEN, URINE: NEGATIVE
BILIRUB SERPL-MCNC: 0.2 MG/DL (ref 0.3–1.2)
BUN SERPL-MCNC: 12 MG/DL (ref 6–20)
CALCIUM SERPL-MCNC: 9.3 MG/DL (ref 8.6–10.4)
CANNABINOID SCREEN URINE: POSITIVE
CHLORIDE SERPL-SCNC: 103 MMOL/L (ref 98–107)
CO2 SERPL-SCNC: 25 MMOL/L (ref 20–31)
COCAINE METABOLITE, URINE: NEGATIVE
CREAT SERPL-MCNC: 0.9 MG/DL (ref 0.7–1.2)
EOSINOPHILS RELATIVE PERCENT: 2 % (ref 1–4)
ETHANOL PERCENT: <0.01 %
ETHANOL: <10 MG/DL
FENTANYL URINE: NEGATIVE
GFR SERPL CREATININE-BSD FRML MDRD: >60 ML/MIN/1.73M2
GLUCOSE SERPL-MCNC: 107 MG/DL (ref 70–99)
HCT VFR BLD AUTO: 48.5 % (ref 40.7–50.3)
HGB BLD-MCNC: 15.9 G/DL (ref 13–17)
IMMATURE GRANULOCYTES: 0 %
LYMPHOCYTES # BLD: 24 % (ref 24–43)
MCH RBC QN AUTO: 31.2 PG (ref 25.2–33.5)
MCHC RBC AUTO-ENTMCNC: 32.8 G/DL (ref 28.4–34.8)
MCV RBC AUTO: 95.3 FL (ref 82.6–102.9)
METHADONE SCREEN, URINE: NEGATIVE
MONOCYTES # BLD: 6 % (ref 3–12)
NRBC AUTOMATED: 0 PER 100 WBC
OPIATES, URINE: NEGATIVE
OXYCODONE SCREEN URINE: NEGATIVE
PDW BLD-RTO: 13.4 % (ref 11.8–14.4)
PHENCYCLIDINE, URINE: NEGATIVE
PLATELET # BLD AUTO: 335 K/UL (ref 138–453)
PMV BLD AUTO: 9 FL (ref 8.1–13.5)
POTASSIUM SERPL-SCNC: 4.8 MMOL/L (ref 3.7–5.3)
PROT SERPL-MCNC: 7.6 G/DL (ref 6.4–8.3)
RBC # BLD: 5.09 M/UL (ref 4.21–5.77)
SALICYLATE LEVEL: <1 MG/DL (ref 3–10)
SEG NEUTROPHILS: 67 % (ref 36–65)
SEGMENTED NEUTROPHILS ABSOLUTE COUNT: 8.65 K/UL (ref 1.5–8.1)
SODIUM SERPL-SCNC: 137 MMOL/L (ref 135–144)
TEST INFORMATION: ABNORMAL
TOXIC TRICYCLIC SC,BLOOD: NEGATIVE
VALPROATE SERPL-MCNC: 28 UG/ML (ref 50–125)
WBC # BLD AUTO: 13.1 K/UL (ref 3.5–11.3)

## 2023-02-08 PROCEDURE — 80179 DRUG ASSAY SALICYLATE: CPT

## 2023-02-08 PROCEDURE — 99285 EMERGENCY DEPT VISIT HI MDM: CPT

## 2023-02-08 PROCEDURE — 1240000000 HC EMOTIONAL WELLNESS R&B

## 2023-02-08 PROCEDURE — 80307 DRUG TEST PRSMV CHEM ANLYZR: CPT

## 2023-02-08 PROCEDURE — 80164 ASSAY DIPROPYLACETIC ACD TOT: CPT

## 2023-02-08 PROCEDURE — 85025 COMPLETE CBC W/AUTO DIFF WBC: CPT

## 2023-02-08 PROCEDURE — 80053 COMPREHEN METABOLIC PANEL: CPT

## 2023-02-08 PROCEDURE — G0480 DRUG TEST DEF 1-7 CLASSES: HCPCS

## 2023-02-08 PROCEDURE — 80143 DRUG ASSAY ACETAMINOPHEN: CPT

## 2023-02-08 RX ORDER — HALOPERIDOL 5 MG/1
5 TABLET ORAL EVERY 6 HOURS PRN
Status: DISCONTINUED | OUTPATIENT
Start: 2023-02-08 | End: 2023-02-16 | Stop reason: HOSPADM

## 2023-02-08 RX ORDER — LORAZEPAM 2 MG/ML
2 INJECTION INTRAMUSCULAR EVERY 6 HOURS PRN
Status: DISCONTINUED | OUTPATIENT
Start: 2023-02-08 | End: 2023-02-16 | Stop reason: HOSPADM

## 2023-02-08 RX ORDER — TRAZODONE HYDROCHLORIDE 50 MG/1
50 TABLET ORAL NIGHTLY PRN
Status: DISCONTINUED | OUTPATIENT
Start: 2023-02-08 | End: 2023-02-16 | Stop reason: HOSPADM

## 2023-02-08 RX ORDER — IBUPROFEN 400 MG/1
400 TABLET ORAL EVERY 6 HOURS PRN
Status: DISCONTINUED | OUTPATIENT
Start: 2023-02-08 | End: 2023-02-16 | Stop reason: HOSPADM

## 2023-02-08 RX ORDER — HYDROXYZINE 50 MG/1
50 TABLET, FILM COATED ORAL 3 TIMES DAILY PRN
Status: DISCONTINUED | OUTPATIENT
Start: 2023-02-08 | End: 2023-02-16 | Stop reason: HOSPADM

## 2023-02-08 RX ORDER — ACETAMINOPHEN 325 MG/1
650 TABLET ORAL EVERY 6 HOURS PRN
Status: DISCONTINUED | OUTPATIENT
Start: 2023-02-08 | End: 2023-02-16 | Stop reason: HOSPADM

## 2023-02-08 RX ORDER — HALOPERIDOL 5 MG/ML
5 INJECTION INTRAMUSCULAR EVERY 6 HOURS PRN
Status: DISCONTINUED | OUTPATIENT
Start: 2023-02-08 | End: 2023-02-16 | Stop reason: HOSPADM

## 2023-02-08 RX ORDER — MAGNESIUM HYDROXIDE/ALUMINUM HYDROXICE/SIMETHICONE 120; 1200; 1200 MG/30ML; MG/30ML; MG/30ML
30 SUSPENSION ORAL EVERY 6 HOURS PRN
Status: DISCONTINUED | OUTPATIENT
Start: 2023-02-08 | End: 2023-02-16 | Stop reason: HOSPADM

## 2023-02-08 RX ORDER — LORAZEPAM 1 MG/1
2 TABLET ORAL EVERY 6 HOURS PRN
Status: DISCONTINUED | OUTPATIENT
Start: 2023-02-08 | End: 2023-02-16 | Stop reason: HOSPADM

## 2023-02-08 RX ORDER — POLYETHYLENE GLYCOL 3350 17 G/17G
17 POWDER, FOR SOLUTION ORAL DAILY PRN
Status: DISCONTINUED | OUTPATIENT
Start: 2023-02-08 | End: 2023-02-16 | Stop reason: HOSPADM

## 2023-02-08 RX ORDER — DIPHENHYDRAMINE HYDROCHLORIDE 50 MG/ML
50 INJECTION INTRAMUSCULAR; INTRAVENOUS EVERY 6 HOURS PRN
Status: DISCONTINUED | OUTPATIENT
Start: 2023-02-08 | End: 2023-02-16 | Stop reason: HOSPADM

## 2023-02-08 ASSESSMENT — SLEEP AND FATIGUE QUESTIONNAIRES
DO YOU HAVE DIFFICULTY SLEEPING: NO
AVERAGE NUMBER OF SLEEP HOURS: 7
DO YOU USE A SLEEP AID: NO

## 2023-02-08 ASSESSMENT — PAIN - FUNCTIONAL ASSESSMENT
PAIN_FUNCTIONAL_ASSESSMENT: NONE - DENIES PAIN
PAIN_FUNCTIONAL_ASSESSMENT: NONE - DENIES PAIN

## 2023-02-08 NOTE — BH NOTE
Patient given tobacco quitline number [phone] at this time, refusing to call at this time, states \" I just dont want to quit now\"- patient given information as to the dangers of long term tobacco use. Continue to reinforce the importance of tobacco cessation.

## 2023-02-08 NOTE — BH NOTE
Provider notified of completed admission. Patient denies suicidal ideation, will continue with patient safety checks q15 and at irregular intervals.

## 2023-02-08 NOTE — ED NOTES
Provisional Diagnosis:    Acute psychosis    Psychosocial and Contextual Factors:     No known factors    C-SSRS Summary:    Patient: X  Family:  Agency:    Present Suicidal Behavior:  denies    Verbal:    Attempt:    Past Suicidal Behavior:  denies    Verbal:    Attempt:    Self-Injurious/Self-Mutilation:      Protective Factors:    Patient has a legal guardian and lives in a group home. Patient is linked with the [facility]. Risk Factors:    Patient presents as delusional, not taking his psychiatric medications per his providers and has been repeatedly calling 911 per dispatch. Clinical Summary:    Patient is a 27year old male that presents to the ED with acute psychosis reporting that there is a bomb in his water heater. Patient has bizarre and delusional thoughts, his speech is pressured and tangential, affect is flat. Patient refers to himself as the \"zodiac boy,\" and is preoccupied with  discussion. He is able to be redirected verbally but is not able to answer questions appropriately. Throughout the conversation, patient does report speaking with \"dispatch\" along with President Melissa, and the [employer]. Writer asked to see patients call log on his phone and it does show a 911 call placed this morning, when asked patient stated there was a bomb in the water heater and people were trying to kill him. He reports \"they sent SWAT. \"  Writer did observe SWAT driving down [employer] at the time patient was arriving to the ED today. Writer contacted [employer] and they confirmed patient has made several calls that are \"ramblings,\" and that police were dispatched to the group home today. Writer contacted patient  at [facility], who confirmed the group has been calling [employer] concerned about patient. Dina Oden reports patient has not been taking his medications and has been locking residents out of the group home.  Dina Oden is in support of an admission and will be communicating with patients providers, including psychiatrist Dr. Alissa Garcia. Patient has a legal guardian, a message was left for guardian asking for a return call. Medical records indicate the last psychiatric admission was in 2018. Level of Care Disposition:     Writer to consult with on call psychiatrist when patient is medically cleared.       LUCA Sandoval  02/08/23 1228

## 2023-02-08 NOTE — BH NOTE
[facility]  Admission Note     Admission Type:   Admission Type: Involuntary    Reason for admission:  Reason for Admission: Patient has delusional thinking, thinks there's a bomb in the water heater. [place] has called [employer] several times, linked with [employer], and has guardian. Patient currently lives in a group home and has locked patients out of the group home several times. Patients denies suicidal/homicidal ideation. Addictive Behavior:   Addictive Behavior  In the Past 3 Months, Have You Felt or Has Someone Told You That You Have a Problem With  : None    Medical Problems:   Past Medical History:   Diagnosis Date    Hypertension     Schizophrenia, schizo-affective (HCC)        Status EXAM:  Mental Status and Behavioral Exam  Normal: No  Level of Assistance: Independent/Self  Facial Expression: Flat  Affect: Appropriate  Level of Consciousness: Alert  Frequency of Checks: 4 times per hour, close  Mood:Normal: No  Mood: Anxious, Suspicious  Motor Activity:Normal: Yes  Eye Contact: Fair  Observed Behavior: Cooperative, Preoccupied  Sexual Misconduct History: Current - no  Preception: [place] to person, [place] to time, [place] to place, [place] to situation  Attention:Normal: No  Attention: Distractible  Thought Processes: Flight of ideas, Tangential  Thought Content:Normal: No  Thought Content: Paranoia, Preoccupations  Depression Symptoms: Impaired concentration, Isolative  Anxiety Symptoms: Generalized  Deana Symptoms: No problems reported or observed.   Hallucinations: None (Patient denied)  Delusions: Yes  Delusions: Paranoid  Memory:Normal: No  Memory: Poor recent  Insight and Judgment: No  Insight and Judgment: Poor judgment, Poor insight    Tobacco Screening:  Practical Counseling, on admission, taryn X, if applicable and completed (first 3 are required if patient doesn't refuse):            ( ) Recognizing danger situations (included triggers and roadblocks)                    ( ) Coping skills (new ways to manage stress,relaxation techniques, changing routine, distraction)                                                           ( ) Basic information about quitting (benefits of quitting, techniques in how to quit, available resources  ( ) Referral for counseling faxed to [facility]                                                                                                                   (x) Patient refused counseling  ( ) Patient has not smoked in the last 30 days    Metabolic Screening:    Lab Results   Component Value Date    LABA1C 4.9 08/30/2018       Lab Results   Component Value Date    CHOL 111 01/30/2020    CHOL 131 08/30/2018     Lab Results   Component Value Date    TRIG 190 (H) 01/30/2020    TRIG 140 08/30/2018     Lab Results   Component Value Date    HDL 31 (L) 01/30/2020    HDL 36 (L) 08/30/2018     No components found for: LDLCAL  No results found for: LABVLDL      Body mass index is 22.73 kg/m². BP Readings from Last 2 Encounters:   02/08/23 119/67   02/08/23 132/69           Pt admitted with followings belongings:  Dental Appliances: None  Vision - Corrective Lenses: None  Hearing Aid: None  Jewelry: None  Body Piercings Removed: No  Clothing: Pants, Shirt, Jacket/Coat, Hat       Patient has guardian. Patient was pinked but Laura Hickman received verbal from guardian. Admitted with acute psychosis and delusional thinking, believed there was a bomb in his hot water heater. Patient lives in a group home, linked with [facility], and has been noncompliant with treatment. Upon admission, patient denies suicidal and homicidal ideation and is calm and controlled.      Asmita Cox RN

## 2023-02-08 NOTE — ED NOTES
Writer received a call back from patient guardian, Tasneem De La Cruz, who is in agreement with plan of hospitalization. Marisabel Pringle states that patient has been hospitalized since 2018 at [facility] in [facility]. Dates of inpatient psychiatric hospitalizations are unknown. Marisabel Pringle states that he will be available for consents when needed.       LUCA Squires  02/08/23 1247

## 2023-02-08 NOTE — ED PROVIDER NOTES
[facility]  ED  EMERGENCY DEPARTMENT ENCOUNTER    Pt Name: Alice Gamez  MRN: [id number]  Teohrlgtt1990  Date of evaluation: 2/8/2023      CHIEF COMPLAINT       Chief Complaint   Patient presents with    Other     Unable to verbalize complaint. Pt having hallucinations. HISTORY OF PRESENT ILLNESS    Alice Gamez is a 27 y.o. male who presents concerned that \"someone is trying to blow my hot water heater. As I enter the room and I asked the patient what brings him to the ED he stated \"I was the first zodiac patient under [employer] for a new procedure here at [facility]. \"  Patient has a extremely disorganized thought unable answer any questions. However he does have an empty body of liquid Depakote that he says he takes for \"leukemia. \"        REVIEW OF SYSTEMS       Review of Systems   Unable to perform ROS: Psychiatric disorder     PAST MEDICAL HISTORY    has a past medical history of Hypertension and Schizophrenia, schizo-affective ([address] SURGICAL HISTORY      has no past surgical history on file. CURRENT MEDICATIONS       Previous Medications    ARIPIPRAZOLE LAUROXIL (ARISTADA) 441 MG/1.6ML PRSY INJECTION    Inject 3.2 mLs into the muscle every 30 days    BUSPIRONE (BUSPAR) 15 MG TABLET    Take 15 mg by mouth 3 times daily    LORATADINE (ALAVERT) 10 MG TABLET    Take 10 mg by mouth daily    MULTIPLE VITAMINS-MINERALS (THERAPEUTIC MULTIVITAMIN-MINERALS) TABLET    Take 1 tablet by mouth daily    PROPRANOLOL (INDERAL) 10 MG TABLET    Take 10 mg by mouth 2 times daily       ALLERGIES     is allergic to duloxetine and tall ragweed. FAMILY HISTORY     has no family status information on file. family history is not on file. SOCIAL HISTORY      reports that he has been smoking. He has never used smokeless tobacco. He reports current drug use. Drug: Marijuana Olya Butler). He reports that he does not drink alcohol.     PHYSICAL EXAM     INITIAL VITALS: height is 5' 11\" (1.803 m) and weight is 160 lb (72.6 kg). His oral temperature is 98.2 °F (36.8 °C). His blood pressure is 132/69 and his pulse is 86. His respiration is 16 and oxygen saturation is 98%. Physical Exam  Constitutional:       Comments: Disheveled appearance but nontoxic   HENT:      Head: Normocephalic and atraumatic. Nose: Nose normal.   Eyes:      Extraocular Movements: Extraocular movements intact. Pupils: Pupils are equal, round, and reactive to light. Cardiovascular:      Rate and Rhythm: Normal rate and regular rhythm. Pulmonary:      Effort: Pulmonary effort is normal. No respiratory distress. Breath sounds: Normal breath sounds. No wheezing. Abdominal:      General: Abdomen is flat. Tenderness: There is no abdominal tenderness. Neurological:      Mental Status: He is alert. Comments: Moving all extremities equally ambulating without difficulty   Psychiatric:      Comments: Speech extremely tangential disorganized flight of ideas delusional hallucinations as well. Denies suicidal or homicidal thoughts       DIFFERENTIAL DIAGNOSIS/ MDM:     Given psychiatric history concern for acute    DIAGNOSTIC RESULTS     EKG: All EKG's are interpreted by the Emergency Department Physician who either signs or Co-signs this chart in the [address] a cardiologist.    Sinus rhythm 92. Normal intervals there is right axis deviation likely limb lead reversal.  No acute ST or T changes.     RADIOLOGY:   I directly visualized the following  images and reviewed theradiologist interpretations:   none      ED BEDSIDE ULTRASOUND:   none    LABS:  Labs Reviewed   TOX SCR, BLD, ED - Abnormal; Notable for the following components:       Result Value    Acetaminophen Level <5 (*)     Salicylate Lvl <1 (*)     All other components within normal limits   CBC WITH AUTO DIFFERENTIAL - Abnormal; Notable for the following components:    WBC 13.1 (*)     Seg Neutrophils 67 (*)     Segs Absolute 8.65 (*)     All other components within normal limits   COMPREHENSIVE METABOLIC PANEL - Abnormal; Notable for the following components:    Glucose 107 (*)     Total Bilirubin 0.2 (*)     All other components within normal limits   URINE DRUG SCREEN - Abnormal; Notable for the following components:    Cannabinoid Scrn, Ur POSITIVE (*)     All other components within normal limits   VALPROIC ACID LEVEL, TOTAL - Abnormal; Notable for the following components:    Valproic Acid Lvl 28 (*)     All other components within normal limits       No acute lab findings    EMERGENCY DEPARTMENT COURSE:   Vitals:    Vitals:    02/08/23 1121 02/08/23 1322   BP: (!) 155/99 132/69   Pulse: (!) 101 86   Resp: 18 16   Temp: 98.8 °F (37.1 °C) 98.2 °F (36.8 °C)   TempSrc: Oral Oral   SpO2: 100% 98%   Weight: 160 lb (72.6 kg)    Height: 5' 11\" (1.803 m)      -------------------------  BP: 132/69, Temp: 98.2 °F (36.8 °C), Heart Rate: 86, Resp: 16    Patient is remained stable throughout ED course. Cooperative. Do feel patient need to be pink slipped. Did find out that patient in fact did call 911 to report upon threat to police to go to the house and that patient has been calling 911 regularly.  also contacted Lona James patient's been noncompliant with medications has been missing appointments. Labs no acute findings, Depakote level is subtherapeutic but not in the toxic range is a possibility for his altered sensorium. Given this do feel patient is medically clear at this time for psychiatric admission    CRITICAL CARE:     none    CONSULTS:  IP CONSULT TO SOCIAL WORK    PROCEDURES:  None    FINAL IMPRESSION      1. Delusions ([address]          DISPOSITION/PLAN       PATIENT REFERRED TO:  No follow-up provider specified.     DISCHARGE MEDICATIONS:  New Prescriptions    No medications on file       (Please note that portions of this note were completed with a voice recognition program.  Efforts were made to edit the dictations butoccasionally words are mis-transcribed. )    Kayla Wilks MD  Attending Emergency Physician                   Kayla Wilks MD  02/08/23 2923

## 2023-02-08 NOTE — ED NOTES
Report given to [facility]. No further questions at this time.       Coral Ghosh, AMINAH  02/08/23 9479

## 2023-02-08 NOTE — ED NOTES
Reviewed pt case with on call psychiatrist who finds that pt meets criteria for inpatient psychiatric admission. Pt to be admitted to the [facility] under the care of Dr. Johanny Fleming with a diagnosis of acute psychosis.       Drew Pool, [address]  02/08/23 3090

## 2023-02-09 PROCEDURE — 6370000000 HC RX 637 (ALT 250 FOR IP): Performed by: PSYCHIATRY & NEUROLOGY

## 2023-02-09 PROCEDURE — 99222 1ST HOSP IP/OBS MODERATE 55: CPT

## 2023-02-09 PROCEDURE — 99222 1ST HOSP IP/OBS MODERATE 55: CPT | Performed by: INTERNAL MEDICINE

## 2023-02-09 PROCEDURE — 6370000000 HC RX 637 (ALT 250 FOR IP)

## 2023-02-09 PROCEDURE — 1240000000 HC EMOTIONAL WELLNESS R&B

## 2023-02-09 RX ORDER — SERTRALINE HYDROCHLORIDE 20 MG/ML
100 SOLUTION ORAL DAILY
Status: ON HOLD | COMMUNITY
End: 2023-02-15 | Stop reason: HOSPADM

## 2023-02-09 RX ORDER — BENZTROPINE MESYLATE 1 MG/1
1 TABLET ORAL 2 TIMES DAILY PRN
Status: ON HOLD | COMMUNITY
End: 2023-02-15 | Stop reason: HOSPADM

## 2023-02-09 RX ORDER — SERTRALINE HYDROCHLORIDE 20 MG/ML
100 SOLUTION ORAL DAILY
Status: DISCONTINUED | OUTPATIENT
Start: 2023-02-09 | End: 2023-02-16 | Stop reason: HOSPADM

## 2023-02-09 RX ORDER — RISPERIDONE 0.5 MG/1
0.5 TABLET, ORALLY DISINTEGRATING ORAL 2 TIMES DAILY
Status: DISCONTINUED | OUTPATIENT
Start: 2023-02-09 | End: 2023-02-10

## 2023-02-09 RX ORDER — VALPROIC ACID 250 MG/5ML
750 SOLUTION ORAL 2 TIMES DAILY
Status: ON HOLD | COMMUNITY
End: 2023-02-15 | Stop reason: SDUPTHER

## 2023-02-09 RX ORDER — OLANZAPINE 20 MG/1
20 TABLET ORAL 2 TIMES DAILY
Status: ON HOLD | COMMUNITY
End: 2023-02-15 | Stop reason: HOSPADM

## 2023-02-09 RX ORDER — VALPROIC ACID 250 MG/5ML
750 SOLUTION ORAL 2 TIMES DAILY
Status: DISCONTINUED | OUTPATIENT
Start: 2023-02-09 | End: 2023-02-16 | Stop reason: HOSPADM

## 2023-02-09 RX ADMIN — RISPERIDONE 0.5 MG: 0.5 TABLET, ORALLY DISINTEGRATING ORAL at 14:01

## 2023-02-09 RX ADMIN — HYDROXYZINE HYDROCHLORIDE 50 MG: 50 TABLET, FILM COATED ORAL at 20:41

## 2023-02-09 RX ADMIN — VALPROIC ACID 750 MG: 250 SOLUTION ORAL at 14:01

## 2023-02-09 RX ADMIN — SERTRALINE HYDROCHLORIDE 100 MG: 20 SOLUTION, CONCENTRATE ORAL at 14:05

## 2023-02-09 RX ADMIN — NICOTINE POLACRILEX 2 MG: 2 GUM, CHEWING BUCCAL at 10:57

## 2023-02-09 RX ADMIN — TRAZODONE HYDROCHLORIDE 50 MG: 50 TABLET ORAL at 20:41

## 2023-02-09 RX ADMIN — RISPERIDONE 0.5 MG: 0.5 TABLET, ORALLY DISINTEGRATING ORAL at 20:41

## 2023-02-09 RX ADMIN — NICOTINE POLACRILEX 2 MG: 2 GUM, CHEWING BUCCAL at 17:53

## 2023-02-09 RX ADMIN — VALPROIC ACID 750 MG: 250 SOLUTION ORAL at 20:40

## 2023-02-09 ASSESSMENT — LIFESTYLE VARIABLES
HOW OFTEN DO YOU HAVE A DRINK CONTAINING ALCOHOL: NEVER
HOW MANY STANDARD DRINKS CONTAINING ALCOHOL DO YOU HAVE ON A TYPICAL DAY: PATIENT DOES NOT DRINK

## 2023-02-09 NOTE — BH NOTE
[facility]  returned call. She informed writer the patient has been compliant with medication until Tuesday, February 7 at nighttime where here he informed her that he no longer wished to compliant with treatment.  reports the patient has been having delusional thoughts for the past 2 days. She states patient was complaining that \"his real name was Sendy Sequeira and will call the FBI and the girl is 15years old and the sex offender charge is not going mistake\".  does clarify the patient needs to go to [facility] to register as a sex offender however he is not willing to comply. She states patient complaining of gross being in the house when he is the only patient living there at the moment. Also she voices that he locked the door would not let her in because he was worried that people were going to kill him. Per  patient is unemployed, no children and possibly has a brother however she was unsure. No other questions at this time.

## 2023-02-09 NOTE — PLAN OF CARE
Problem: Anxiety  Goal: Will report anxiety at manageable levels  Description: INTERVENTIONS:  1. Administer medication as ordered  2. Teach and rehearse alternative coping skills  3. Provide emotional support with 1:1 interaction with staff  Outcome: Progressing   Patient denies anxiety but has some paranoia when talking about the workers where he lives. Problem: Depression/Self Harm  Goal: Effect of psychiatric condition will be minimized and patient will be protected from self harm  Description: INTERVENTIONS:  1. Assess impact of patient's symptoms on level of functioning, self care needs and offer support as indicated  2. Assess patient/family knowledge of depression, impact on illness and need for teaching  3. Provide emotional support, presence and reassurance  4. Assess for possible suicidal thoughts or ideation. If patient expresses suicidal thoughts or statements do not leave alone, initiate Suicide Precautions, move to a room close to the nursing station and obtain sitter  5. Initiate consults as appropriate with Mental Health Professional, Spiritual Care, Psychosocial CNS, and consider a recommendation to the LIP for a Psychiatric Consultation  Outcome: Progressing   Patient denies depression or suicidal thougths. He rests in bed at intervals but was cooperative with interventions  Problem: Psychosis  Goal: Will report no hallucinations or delusions  Description: INTERVENTIONS:  1. Administer medication as  ordered  2. Assist with reality testing to support increasing orientation  3. Assess if patient's hallucinations or delusions are encouraging self harm or harm to others and intervene as appropriate  Outcome: Progressing   Patient makes some paranoid and bizarre statements.    Problem: Risk for Elopement  Goal: Patient will not exit the unit/facility without proper excort  Outcome: Progressing   Patient has not made any attempts to leave the unit

## 2023-02-09 NOTE — H&P
Department of Psychiatry  Attending Physician Psychiatric Assessment     Reason for Admission to Psychiatric Unit:  Acute disordered/bizarre behavior or psychomotor agitation or retardation;interferes with ADLs so that patient cannot function at a less intensive care level of care during evaluation and treatment   Concerns about patient's safety in the community    CHIEF COMPLAINT: Acute psychosis    History obtained from: Patient, electronic medical record          HISTORY OF PRESENT ILLNESS:    Carli Garcia is a 27 y.o. male who has a past medical history of pretension and schizophrenia. Patient presented to the [facility] ED via emergency admission with acute psychosis after reported there was a bomb in his water heater. Per emergency admission, \"patient here after calling 911 for potential bomb threat. Pt extreme disorganized thoughts and speech. Persecutory delusions. Medically cleared. \"    Per ED documentation, \"Patient is a 27year old male that presents to the ED with acute psychosis reporting that there is a bomb in his water heater. Patient has bizarre and delusional thoughts, his speech is pressured and tangential, affect is flat. Patient refers to himself as the \"zodiac boy,\" and is preoccupied with  discussion. He is able to be redirected verbally but is not able to answer questions appropriately. Throughout the conversation, patient does report speaking with \"dispatch\" along with President Melissa, and the NSA. Writer asked to see patients call log on his phone and it does show a 911 call placed this morning, when asked patient stated there was a bomb in the water heater and people were trying to kill him. He reports \"they sent SWAT. \"  Writer did observe SWAT driving down [employer] at the time patient was arriving to the ED today.   Writer contacted [facility] and they confirmed patient has made several calls that are \"ramblings,\" and that police were dispatched to the group home today. Writer contacted patient  at [facility], who confirmed the group has been calling [facility] concerned about patient. Sen Kennedys reports patient has not been taking his medications and has been locking residents out of the group home. Sen Keith is in support of an admission and will be communicating with patients providers, including psychiatrist Dr. Ford Quick. Patient has a legal guardian, a message was left for guardian asking for a return call. Medical records indicate the last psychiatric admission was in 2018. \"    Patient is agreeable to intake assessment on room where he is standing and somewhat pacing. He is bizarre, delusional, pressured, per verbal and tangential speech with flat affect. Patient unable to answer questions appropriately and requires frequent redirection during assessment. He voices being here due to \"gas leak at home and zodiac killer's\". Patient is noted to be restless and sweating and states \"I have ethanol poisoning my blood that is why I am sweating\". Nursing staff does verify the patient was observed working out and on room. Patient believes that he is the \"Anthony the [employer]ondo[employer]\" and \"zodiac boy\". Patient continues to be bizarre and delusional throughout assessment and voices that he was born on life support and reports to have \"marks to right lower abdomen and teeth to prove it\". Poor dentition noted however no marks/scars noted to patient's abdomen upon assessment. Also he believes that he \"killed [employer] or Oumar Lowe" who he reports is \"Alli Kincaid's son\". Patient voices to have had poor sleep and full of energy, on and off for \"16 hours at a time\". He denies any thoughts of self-harm or thoughts of harming others. Patient denies any perceptual disturbances however appears preoccupied and to be responding to internal stimulation.   Per EMR records patient has experienced visual hallucinations in the past.  He endorses paranoia that workers at the group home are out to get him. Writer was unable to complete intake assessment due to patient's current psychotic state. Patient unwilling to disclose information to any family members/friends to help with collateral information. Plan to further assess patient went stable from current acute psychosis. Patient denies any issues with violence, aggressiveness and forensic history. Per guardian patient is a registered sex offender in [facility] who has not been following up with charges. He denies any current legal issues however per prior documentation patient has made multiple marijuana calls. Patient denies any prior or current issues with alcohol abuse. He endorses being in nicotine user, smoking 1 pack daily since age 11. Patient reports occasional cannabis use. He reports history of \"recreational, opioids and Xanax\" use, unable to disclose last use. Alcohol level negative and urine toxin positive for cannabis on admission. PDMP reviewed, no activity noted. Writer called camila Deras at [phone] to gather collateral information. Current reports of known patient for 10 to 15 years and has struggled for paranoid delusions with history of noncompliance. He reports the patient has prior hospitalization at [facility] and violence psychiatric unit in [place]. Last known inpatient hospitalization was about 1-1/2 to 2 years ago per guardian. Guardian reports that patient plans to work for the [employer] however has been unemployed and has lived in a group home. He reports the patient has history of being a sex offender and will be possibly picked up by [facility] due to not following up with the charges. Guardian has been aware of patient's current state and treatment plan.   Current treatment plan reviewed and agreeable to start including medication per guardian: valproic acid 750 mg solution p.o. twice daily, Zoloft 100 mg solution p.o. daily and add Risperdal 0.5 mg disintegrating p.o. twice daily with goal to transition to [facility] [facility]. Guardian agreeable to transition to LOMBARDI due to patient's history of noncompliance. Writer attempted to call Estevan Zaldivar  at [phone] however was unsuccessful, voicemail was left to return call to the nurses station. Patient warrants inpatient hospitalization due to the above mentioned symptoms. He requires lower level of care due to instability and symptoms and safety. History of head trauma: [] Yes [x] No    History of seizures: [] Yes [x] No    History of violence or aggression: [] Yes [x] No         PSYCHIATRIC HISTORY:  [] Yes [] No    Currently follows with [facility]  Unable to assess if lifetime suicide attempts due to patient current psychotic state  Endorses psychiatric hospital admissions, patient has been at [facility] in the past.  Patient last admitted to [facility] in October 2018.   Unknown if any current inpatient hospitalization    Home Medication Compliance: [] Yes [x] No    Past psychiatric medications includes: Aristada, BuSpar  Patient unable to recall current medication however per pharmacy valproic acid 750 mg solution p.o. twice daily   Zoloft 100 mg solution p.o. daily  Cogentin 1 mg twice daily  Zyprexa 20 mg p.o. twice daily  Adverse reactions from psychotropic medications: [] Yes [x] No         Lifetime Psychiatric Review of Systems         Depression: Unable to assess due to patient current state     Anxiety: Unable to assess due to patient current state     Panic Attacks: Unable to assess due to patient current state     Deana or Hypomania: Unable to assess due to patient current state     Phobias: Unable to assess due to patient current state     Obsessions and Compulsions: Unable to assess due to patient current state     Body or Vocal Tics: Unable to assess due to patient current state     Visual Hallucinations: Denies however per EMR endorses history     Auditory Hallucinations: Denies     Delusions/Paranoia: Endorses     PTSD: Unable to assess due to patient current state    Past Medical History:        Diagnosis Date    Hypertension     Schizophrenia, schizo-affective ([address]        Past Surgical History:    History reviewed. No pertinent surgical history. 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\"  Marital Status: Denies  Children: Denies  Residence: Group Home  Stressors: Own mental illness  Patient Assets/Supportive Factors: Linked with outpatient mental health provider, stable housing and willing to seek help         DRUG USE HISTORY  Social History     Tobacco Use   Smoking Status Every Day   Smokeless Tobacco Never   Tobacco Comments    pt on nicotine patch     Social History     Substance and Sexual Activity   Alcohol Use No     Social History     Substance and Sexual Activity   Drug Use Yes    Types: Marijuana (Mckenzie Leaver)     Patient denies any prior or current issues with alcohol abuse. He endorses being in nicotine user, smoking 1 pack daily since age 11. Patient reports occasional cannabis use. He reports history of \"recreational, opioids and Xanax\" use, unable to disclose last use. Alcohol level negative and urine toxin positive for cannabis on admission. PDMP reviewed, no activity noted. LEGAL HISTORY:   HISTORY OF INCARCERATION: [] Yes [x] No    Family History:   History reviewed. No pertinent family history. Psychiatric Family History  Patient denies psychiatric family history.      Suicides in family: [] Yes [x] No    Substance use in family: [] Yes [x] No         PHYSICAL EXAM:  Vitals:  BP (!) 122/59   Pulse 86   Temp 97.9 °F (36.6 °C) (Temporal)   Resp 14   Ht 5' 11\" (1.803 m) Wt 163 lb (73.9 kg)   BMI 22.73 kg/m²   Pain Level: Denies pain or discomfort    LABS:  Labs reviewed: [x] Yes  Metabolic Screening:  [x] Yes [] No  1/30/2020 lipid panel reviewed  8/30/2018 hemoglobin A1c reviewed    Last EKG in EMR reviewed: [x] Yes  11/18/2022 QTc 422       Review of Systems   Constitutional: Negative for chills and weight loss. HENT: Negative for ear pain and nosebleeds. Eyes: Negative for blurred vision and photophobia. Respiratory: Negative for cough, shortness of breath and wheezing. Cardiovascular: Negative for chest pain and palpitations. Gastrointestinal: Negative for abdominal pain, diarrhea and vomiting. Genitourinary: Negative for dysuria and urgency. Musculoskeletal: Negative for falls and joint pain. Skin: Negative for itching and rash. Neurological: Negative for tremors, seizures and weakness. Endo/Heme/Allergies: Does not bruise/bleed easily. Mental Status Examination:    Level of consciousness: Awake and alert  Appearance:  Appropriate attire, standing, fair grooming   Behavior/Motor: Approachable, engages with interviewer, restless, preoccupied   Attitude toward examiner:  Cooperative, inattentive, fair eye contact  Speech: Normal rate, volume, and tone. Mood: Anxious, suspicious  Affect: Flat  Thought processes:  Goal directed, linear  Thought content: Denies suicidal ideations, without current plan or intent, contracts for safety on the unit.                Denies homicidal ideations               Denies hallucinations however appears preoccupied and to be responding to internal stimulation              Endorses delusions              Endorses paranoia  Cognition:  Oriented to self, disorganized to location time and situation  Concentration: Distractible  Memory: Poor recent  Insight &Judgment: Poor         DSM-5 Diagnosis    Principal Problem: Acute psychosis ([facility] [address]    Cannabis use disorder    Psychosocial and Contextual factors:  Financial Occupational   Relationship   Legal   Living situation   Educational X    Past Medical History:   Diagnosis Date    Hypertension     Schizophrenia, schizo-affective ([facility] [address]         PLAN:  Continue inpatient psychiatric treatment. Home medications reviewed. Start Risperdal 0.5 mg disintegrating tablet p.o. twice daily with goal to   transition to LOMBARDI due to noncompliance    Start Zoloft 100 mg solution p.o. daily    Valproic acid 750 mg p.o. solution twice daily    Monitor need and frequency of PRN medications. Attempt to develop insight. Follow-up daily while inpatient. Reviewed medications risks and benefits as well as potential side effects with patient. CONSULT:  [x] Yes [] No  Internal medicine for medical management/medical H&P      Risk Management: close watch per standard protocol      Psychotherapy: participation in milieu and group and individual sessions with Attending Physician,  and Physician Assistant/CNP      Estimated length of stay:  2-14 days      GENERAL PATIENT/FAMILY EDUCATION  Patient will understand basic signs and symptoms, patient will understand benefits/risks and potential side effects from proposed medications, and patient will understand their role in recovery. Family is not active in patient's care. Patient assets that may be helpful during treatment include: Intent to participate and engage in treatment, sufficient fund of knowledge and intellect to understand and utilize treatments. Patient has a legal guardian     Goals:    1) Remission of acute psychosis. 2) Stabilization of symptoms prior to discharge. 3) Establish efficacy and tolerability of medications.          Behavioral Services  Medicare Certification     Admission Day 1  I certify that this patient's inpatient psychiatric hospital admission is medically necessary for:    x (1) treatment which could reasonably be expected to improve this patient's condition, or    x (2) diagnostic study or its equivalent. Time Spent: 60 minutes    Abby Polanco is a 27 y.o. male being evaluated face to face    --ROBERTO Fernandez CNP on 2/9/2023 at 12:01 PM    An electronic signature was used to authenticate this note. Please note that this chart was generated using voice recognition Dragon dictation software. Although every effort was made to ensure the accuracy of this automated transcription, some errors in transcription may have occurred.

## 2023-02-09 NOTE — GROUP NOTE
Group Therapy Note    Date: 2/9/2023    Group Start Time: 4899  Group End Time: 1130  Group Topic: Cognitive Skills    KEYANNA CHRIS    MELIDA Smith        Group Therapy Note    Attendees: 1/10    Cognitive Skills Group Note        Date: February 9, 2023 Start Time: 10:45am  End Time: 11:30am      Number of Participants in Group & Unit Census:  1/10    Topic: interpersonal skills, decision-making, concentration     Goal of Group: To improve interpersonal skills and decision-making skills through collaborating with peers and concentrating on a presented task. Comments:     Patient did not participate in Cognitive Skills group, despite staff encouragement and explanation of benefits. Patient remain seclusive to self. Q15 minute safety checks maintained for patient safety and will continue to encourage patient to attend unit programming.         Signature:  MELIDA Smith

## 2023-02-09 NOTE — PROGRESS NOTES
Behavioral Services  Medicare Certification Upon Admission    I certify that this patient's inpatient psychiatric hospital admission is medically necessary for:    [x] (1) Treatment which could reasonably be expected to improve this patient's condition,       [x] (2) Or for diagnostic study;     AND     [x](2) The inpatient psychiatric services are provided while the individual is under the care of a physician and are included in the individualized plan of care.     Estimated length of stay/service 3-5 days    Plan for post-hospital care hc    Electronically signed by Hoda Gonzales MD on 2/9/2023 at 11:25 AM

## 2023-02-09 NOTE — CARE COORDINATION
BHI Biopsychosocial Assessment    Current Level of Psychosocial Functioning     Independent   Dependent  X  Minimal Assist     Comments:  Legal Guardian: Melvi Sprague  Psychosocial High Risk Factors (check all that apply)    Unable to obtain meds   Chronic illness/pain    Substance abuse   Lack of Family Support   Financial stress   Isolation   Inadequate Community Resources  Suicide attempt(s)   Not taking medications   Victim of crime   Developmental Delay  Unable to manage personal needs    Age 72 or older   Homeless  No transportation   Readmission within 30 days  Unemployment  Traumatic Event    Comments:   Psychiatric Advanced Directives: n/a    Family to Involve in Treatment: guardian    Sexual Orientation:  n/a    Patient Strengths: lives at group home, legal guardian    Patient Barriers: substance use, non-reality based thoughts,       Opiate Education Provided:  no- patient does not use opiates      [facility] health [facility]: [facility]    Plan of Care   medication management, group/individual therapies, family meetings, psycho -education, treatment team meetings to assist with stabilization    Initial Discharge Plan:  to be determined by guardian      Clinical Summary:    Alaina Ji is a 28 y/o male admitted to [place] [address] for symptoms of acute psychosis. Social work attempted to interview patient today in his room for assessment however he was not able to fully participate due to tangential thoughts and delusions. Patient is not oriented to place and believes he is in [place] because he crossed the state line. Per chart patient arrived to the ED stating there is a bomb in the water heater at his home. He has a legal guardian and is currently not linked for mental health services in the community at this time. Plan will be to coordinate with guardian and group home to determine if patient is able to return. Social work will continue to offer support and engage in treatment.

## 2023-02-09 NOTE — BH NOTE
[facility]  Initial Interdisciplinary Treatment Plan NO      Original treatment plan Date & Time: 02/09/23 0900    Admission Type:  Admission Type: Involuntary    Reason for admission:   Reason for Admission: Patient has delusional thinking, thinks there's a bomb in the water heater. Patiet has called polcie several times, linked with Ze, and has guardian. Patient currently lives in a group home and has locked patients out of the group home several times. Patients denies suicidal/homicidal ideation. Estimated Length of Stay:  5-7days  Estimated Discharge Date: to be determined by physician    PATIENT STRENGTHS:  Patient Strengths:   Patient Strengths and Limitations:Limitations: Unrealistic self-view, External locus of control, Difficulty problem solving/relies on others to help solve problems  Addictive Behavior: Addictive Behavior  In the Past 3 Months, Have You Felt or Has Someone Told You That You Have a Problem With  : None  Medical Problems:  Past Medical History:   Diagnosis Date    Hypertension     Schizophrenia, schizo-affective ([facility] [address]      Status EXAM:Mental Status and Behavioral Exam  Normal: Yes  Level of Assistance: Independent/Self  Facial Expression: Flat  Affect: Blunt  Level of Consciousness: Alert  Frequency of Checks: 4 times per hour, close  Mood:Normal: No  Mood: Anxious, Suspicious  Motor Activity:Normal: Yes  Eye Contact: Fair  Observed Behavior: Withdrawn, Cooperative, Preoccupied  Sexual Misconduct History: Past - no  Preception: [place] to person, [place] to place, [place] to situation  Attention:Normal: No  Attention: Distractible  Thought Processes: Flight of ideas, Tangential  Thought Content:Normal: No  Thought Content: Delusions, Paranoia, Preoccupations  Depression Symptoms: Impaired concentration, Isolative  Anxiety Symptoms: Generalized  Deana Symptoms: No problems reported or observed.   Hallucinations: None  Delusions: Yes  Delusions: Paranoid, Persecutory  Memory:Normal: No  Memory: Poor recent  Insight and Judgment: No  Insight and Judgment: Poor judgment, Poor insight    EDUCATION:   Learner Progress Toward Treatment Goals: reviewed group plans and strategies for care    Method:group therapy, medication compliance, individualized assessments and care planning    Outcome: needs reinforcement    PATIENT GOALS: to be discussed with patient within 72 hours    PLAN/TREATMENT RECOMMENDATIONS:     continue group therapy , medications compliance, goal setting, individualized assessments and care, continue to monitor pt on unit      SHORT-TERM GOALS:   Time frame for Short-Term Goals: 5-7 days    LONG-TERM GOALS:  Time frame for Long-Term Goals: 6 months  Members Present in Team Meeting: See Signature Sheet    Vik Martinez RN

## 2023-02-09 NOTE — GROUP NOTE
Group Therapy Note    Date: 2/9/2023    Group Start Time: 1430  Group End Time: 1500  Group Topic: Psychoeducation    KEYANNA CHRIS    MELIDA Campa        Group Therapy Note    Attendees: 1/10    Psych-Ed/Relapse Prevention Group Note        Date: February 9, 2023 Start Time: 2:30pm  End Time: 3:00pm      Number of Participants in Group & Unit Census:  1/10    Topic: interpersonal skills, emotional identification, coping skills    Goal of Group: To improve interpersonal skills and coping skills awareness through collaborating with peers and demonstrating emotional identification. Comments:     Patient did not participate in Psych-Ed/Relapse Prevention group, despite staff encouragement and explanation of benefits. Patient remain seclusive to self. Q15 minute safety checks maintained for patient safety and will continue to encourage patient to attend unit programming.         Signature:  MELIDA Campa

## 2023-02-09 NOTE — PROGRESS NOTES
Received return call from [address] [facility].. The patient uses a 5 mg and 15 mg tablet in the morning (total 20 mg) and 20 mg tablet at bedtime due to insurance not covering two 20 mg per day. Medication list in Epic is complete.

## 2023-02-09 NOTE — PROGRESS NOTES
Pharmacy Medication History Note      List of current medications patient is taking is in progress. Source of information: [facility]), [place], [place]    Changes made to medication list:  Medications removed (include reason, ex. therapy complete or physician discontinued, noncompliance):  Buspar (list clean up), Samantha Keen (list clean up), Loratadine (list clean up), Propranolol (list clean up), Multivitamin (list clean up)    Medications added/doses adjusted: Added Sertraline 20mg/mL; 100 mg (5 mL) daily  Added Valproic acid 250mg/5 mL; 750 mg (15 mL) daily  Added Benztropine 1 mg twice daily as needed for EPS  Added Olanzapine 20 mg twice daily    Other notes (ex. Recent course of antibiotics, Coumadin dosing):  Left message for [facility] to clarify Olanzapine dosing. [facility] reported patient is taking 5 mg and 15 mg tablets in the morning (total 20 mg) and 20 mg table in the evening. Current Home Medication List at Time of Admission:  Prior to Admission medications    Medication Sig   sertraline (ZOLOFT) 20 MG/ML concentrated solution Take 100 mg by mouth daily   valproic acid (DEPAKENE) 250 MG/5ML SOLN oral solution Take 750 mg by mouth 2 times daily   benztropine (COGENTIN) 1 MG tablet Take 1 mg by mouth 2 times daily as needed (EPS)   OLANZapine (ZYPREXA) 20 MG tablet Take 20 mg by mouth in the morning and at bedtime         Please let me know if you have any questions about this encounter. Thank you!     Electronically signed by Peggy Naranjo, [address] [place] [address] on 2/9/2023 at 8:58 AM

## 2023-02-09 NOTE — H&P
[facility] Internal Medicine    HISTORY AND PHYSICAL EXAMINATION/ CONSULT NOTE            Date:   2/9/2023  Patient name:  Isaac Ann  Date of admission:  2/8/2023  3:41 PM  MRN:   [id number]  Account:  [de-identified]  YOB: 1990  PCP:    No primary care provider on file. Room:   [address] [place] [address]  Code Status:    Full Code    Physician Requesting Consult: Jesenia Godoy, *    Reason for Consult: History and physical, medical management    Chief Complaint:     No chief complaint on file. Medical management    History Obtained From:     Patient, EMR, nursing staff    History of Present Illness:     67-year-old male admitted for acute psychosis  Medical history significant for hypertension, blood pressure currently controlled without medication    No other significant medical history  Patient denies any chest pain palpitation cough difficulty breathing nausea vomiting diarrhea or urinary symptoms              Past Medical History:     Past Medical History:   Diagnosis Date    Hypertension     Schizophrenia, schizo-affective ([facility] [address]         Past Surgical History:     History reviewed. No pertinent surgical history. Medications Prior to Admission:     Prior to Admission medications    Medication Sig Start Date End Date Taking? Authorizing Provider   sertraline (ZOLOFT) 20 MG/ML concentrated solution Take 100 mg by mouth daily   Yes Historical Provider, MD   valproic acid (DEPAKENE) 250 MG/5ML SOLN oral solution Take 750 mg by mouth 2 times daily   Yes Historical Provider, MD   benztropine (COGENTIN) 1 MG tablet Take 1 mg by mouth 2 times daily as needed (EPS)   Yes Historical Provider, MD   OLANZapine (ZYPREXA) 20 MG tablet Take 20 mg by mouth in the morning and at bedtime   Yes Historical Provider, MD        Allergies:     Duloxetine and Tall ragweed    Social History:     Tobacco:    reports that he has been smoking.  He has never used smokeless tobacco.  Alcohol:      reports no history of alcohol use. Drug Use:  reports current drug use. Drug: Marijuana Angelina Mixon). Family History:     History reviewed. No pertinent family history. Review of Systems:     Positive and Negative as described in HPI. Denies any shortness of breath or cough  Denies chest pain or palpitations  Denies abdominal pain, diarrhea vomiting  Denies any new numbness tremors or weakness. 10 point review of systems was negative other than mentioned above    Physical Exam:     BP (!) 122/59   Pulse 86   Temp 97.9 °F (36.6 °C) (Temporal)   Resp 14   Ht 5' 11\" (1.803 m)   Wt 163 lb (73.9 kg)   BMI 22.73 kg/m²   Temp (24hrs), Av.2 °F (36.8 °C), Min:97.9 °F (36.6 °C), Max:98.3 °F (36.8 °C)    No results for input(s): POCGLU in the last 72 hours. No intake or output data in the 24 hours ending 23 1124    General Appearance:  alert, well appearing, and in no acute distress  Head:  normocephalic, atraumatic. Eye: no icterus, redness, pupils equal and reactive, extraocular eye movements intact, conjunctiva clear  Ear: normal external ear, no discharge, hearing intact  Nose:  no drainage noted  Mouth: mucous membranes moist, poor oral hygiene  Neck: supple, no carotid bruits, thyroid not palpable  Lungs: Bilateral equal air entry, clear to ausculation, no wheezing, rales or rhonchi, normal effort  Cardiovascular: normal rate, regular rhythm, no murmur, gallop, rub.   Abdomen: Soft, nontender, nondistended, normal bowel sounds, no hepatomegaly or splenomegaly  Neurologic: There are no new focal motor or sensory deficits, normal muscle tone and bulk, no abnormal sensation, normal speech, cranial nerves II through XII grossly intact  Skin: No gross lesions, rashes, bruising or bleeding on exposed skin area  Extremities:  No joint swelling, no pedal edema or calf pain with palpation      Investigations:      Laboratory Testing:  Recent Results (from the past 24 hour(s))   TOX SCR, BLD, ED    Collection Time: 02/08/23 12:11 PM   Result Value Ref Range    Acetaminophen Level <5 (L) 10 - 30 ug/mL    Ethanol <10 <10 mg/dL    Ethanol percent <0.519 <2.165 %    Salicylate Lvl <1 (L) 3 - 10 mg/dL    Toxic Tricyclic Sc,Blood NEGATIVE NEGATIVE   CBC with Auto Differential    Collection Time: 02/08/23 12:11 PM   Result Value Ref Range    WBC 13.1 (H) 3.5 - 11.3 k/uL    RBC 5.09 4. 21 - 5.77 m/uL    Hemoglobin 15.9 13.0 - 17.0 g/dL    Hematocrit 48.5 40.7 - 50.3 %    MCV 95.3 82.6 - 102.9 fL    MCH 31.2 25.2 - 33.5 pg    MCHC 32.8 28.4 - 34.8 g/dL    RDW 13.4 11.8 - 14.4 %    Platelets 494 665 - 256 k/uL    MPV 9.0 8.1 - 13.5 fL    NRBC Automated 0.0 0.0 per 100 WBC    Seg Neutrophils 67 (H) 36 - 65 %    Lymphocytes 24 24 - 43 %    Monocytes 6 3 - 12 %    Eosinophils % 2 1 - 4 %    Basophils 1 0 - 2 %    Immature Granulocytes 0 0 %    Segs Absolute 8.65 (H) 1.50 - 8.10 k/uL    Absolute Lymph # 3.18 1.10 - 3.70 k/uL    Absolute Mono # 0.83 0.10 - 1.20 k/uL    Absolute Eos # 0.29 0.00 - 0.44 k/uL    Basophils Absolute 0.08 0.00 - 0.20 k/uL    Absolute Immature Granulocyte 0.05 0.00 - 0.30 k/uL   Comprehensive Metabolic Panel    Collection Time: 02/08/23 12:11 PM   Result Value Ref Range    Glucose 107 (H) 70 - 99 mg/dL    BUN 12 6 - 20 mg/dL    Creatinine 0.90 0.70 - 1.20 mg/dL    Est, Glom Filt Rate >60 >60 mL/min/1.73m2    Calcium 9.3 8.6 - 10.4 mg/dL    Sodium 137 135 - 144 mmol/L    Potassium 4.8 3.7 - 5.3 mmol/L    Chloride 103 98 - 107 mmol/L    CO2 25 20 - 31 mmol/L    Anion Gap 9 9 - 17 mmol/L    Alkaline Phosphatase 69 40 - 129 U/L    ALT 17 5 - 41 U/L    AST 15 <40 U/L    Total Bilirubin 0.2 (L) 0.3 - 1.2 mg/dL    Total Protein 7.6 6.4 - 8.3 g/dL    Albumin 4.6 3.5 - 5.2 g/dL    Albumin/Globulin Ratio 1.5 1.0 - 2.5   Valproic Acid Level, Total    Collection Time: 02/08/23 12:11 PM   Result Value Ref Range    Valproic Acid Lvl 28 (L) 50 - 125 ug/mL   Urine Drug Screen    Collection Time: 02/08/23 12:28 PM   Result Value Ref Range    Amphetamine Screen, Ur NEGATIVE NEGATIVE    Barbiturate Screen, Ur NEGATIVE NEGATIVE    Benzodiazepine Screen, Urine NEGATIVE NEGATIVE    Cocaine Metabolite, Urine NEGATIVE NEGATIVE    Methadone Screen, Urine NEGATIVE NEGATIVE    Opiates, Urine NEGATIVE NEGATIVE    Phencyclidine, Urine NEGATIVE NEGATIVE    Cannabinoid Scrn, Ur POSITIVE (A) NEGATIVE    Oxycodone Screen, Ur NEGATIVE NEGATIVE    Fentanyl, Ur NEGATIVE NEGATIVE    Test Information       Assay provides medical screening only. The absence of expected drug(s) and/or metabolite(s) may indicate diluted or adulterated urine, limitations of testing or timing of collection. Imaging/Diagonstics:  Recent data reviewed    Assessment :      Primary Problem  <principal problem not specified>    Active Hospital Problems    Diagnosis Date Noted    [facility] [F23] 02/08/2023     Priority: Medium       Plan:     Reason for consult: General medical management     Acute psychosis-management per psychiatry  Leukocytosis-WBC 13-no signs symptom of infection-we will repeat labs tomorrow  Other labs and vitals reviewed, will add TSH rule out organic cause        DVT prophylaxis-not required, patient is mobile    Consultations:   Mejia Khan MD  2/9/2023  11:24 AM    Copy sent to No primary care provider on file. Please note that this chart was generated using voice recognition Dragon dictation software. Although every effort was made to ensure the accuracy of this automated transcription, some errors in transcription may have occurred.

## 2023-02-09 NOTE — PLAN OF CARE
Problem: Anxiety  Goal: Will report anxiety at manageable levels  Description: INTERVENTIONS:  1. Administer medication as ordered  2. Teach and rehearse alternative coping skills  3. Provide emotional support with 1:1 interaction with staff  Outcome: Progressing  Note: 1:1 with pt x ten minutes. Pt encouraged to attend unit programming and interact with peers and staff. Pt also encouraged to tend to hygiene and ADLs. Pt encouraged to discuss feelings with staff and feedback and reassurance provided. Problem: Depression/Self Harm  Goal: Effect of psychiatric condition will be minimized and patient will be protected from self harm  Description: INTERVENTIONS:  1. Assess impact of patient's symptoms on level of functioning, self care needs and offer support as indicated  2. Assess patient/family knowledge of depression, impact on illness and need for teaching  3. Provide emotional support, presence and reassurance  4. Assess for possible suicidal thoughts or ideation. If patient expresses suicidal thoughts or statements do not leave alone, initiate Suicide Precautions, move to a room close to the nursing station and obtain sitter  5. Initiate consults as appropriate with Mental Health Professional, Spiritual Care, Psychosocial CNS, and consider a recommendation to the LIP for a Psychiatric Consultation  Outcome: Progressing  Note: Pt denies thoughts of self harm and is agreeable to seeking out should thoughts of self harm arise. Safe environment maintained. Q15 minute checks for safety cont per unit policy. Will cont to monitor for safety and provides support and reassurance as needed. Problem: Psychosis  Goal: Will report no hallucinations or delusions  Description: INTERVENTIONS:  1. Administer medication as  ordered  2. Assist with reality testing to support increasing orientation  3.  Assess if patient's hallucinations or delusions are encouraging self harm or harm to others and intervene as appropriate  Outcome: Progressing  Note: Patient continues to express tangential delusional statements.

## 2023-02-10 LAB
HCT VFR BLD AUTO: 43 % (ref 41–53)
HGB BLD-MCNC: 14.1 G/DL (ref 13.5–17.5)
MCH RBC QN AUTO: 31 PG (ref 26–34)
MCHC RBC AUTO-ENTMCNC: 32.8 G/DL (ref 31–37)
MCV RBC AUTO: 94.5 FL (ref 80–100)
PDW BLD-RTO: 14.1 % (ref 11.5–14.9)
PLATELET # BLD AUTO: 322 K/UL (ref 150–450)
PMV BLD AUTO: 7.3 FL (ref 6–12)
RBC # BLD: 4.55 M/UL (ref 4.5–5.9)
TSH SERPL-ACNC: 1.44 UIU/ML (ref 0.3–5)
WBC # BLD AUTO: 7.3 K/UL (ref 3.5–11)

## 2023-02-10 PROCEDURE — 85027 COMPLETE CBC AUTOMATED: CPT

## 2023-02-10 PROCEDURE — 84443 ASSAY THYROID STIM HORMONE: CPT

## 2023-02-10 PROCEDURE — 36415 COLL VENOUS BLD VENIPUNCTURE: CPT

## 2023-02-10 PROCEDURE — 99232 SBSQ HOSP IP/OBS MODERATE 35: CPT | Performed by: INTERNAL MEDICINE

## 2023-02-10 PROCEDURE — 1240000000 HC EMOTIONAL WELLNESS R&B

## 2023-02-10 PROCEDURE — 6370000000 HC RX 637 (ALT 250 FOR IP)

## 2023-02-10 PROCEDURE — 99232 SBSQ HOSP IP/OBS MODERATE 35: CPT

## 2023-02-10 PROCEDURE — 6370000000 HC RX 637 (ALT 250 FOR IP): Performed by: PSYCHIATRY & NEUROLOGY

## 2023-02-10 RX ORDER — RISPERIDONE 1 MG/1
1 TABLET, ORALLY DISINTEGRATING ORAL 2 TIMES DAILY
Status: DISCONTINUED | OUTPATIENT
Start: 2023-02-10 | End: 2023-02-12

## 2023-02-10 RX ADMIN — NICOTINE POLACRILEX 2 MG: 2 GUM, CHEWING BUCCAL at 07:21

## 2023-02-10 RX ADMIN — VALPROIC ACID 750 MG: 250 SOLUTION ORAL at 20:32

## 2023-02-10 RX ADMIN — NICOTINE POLACRILEX 2 MG: 2 GUM, CHEWING BUCCAL at 21:08

## 2023-02-10 RX ADMIN — RISPERIDONE 1 MG: 1 TABLET, ORALLY DISINTEGRATING ORAL at 21:07

## 2023-02-10 RX ADMIN — IBUPROFEN 400 MG: 400 TABLET, FILM COATED ORAL at 20:31

## 2023-02-10 RX ADMIN — SERTRALINE HYDROCHLORIDE 100 MG: 20 SOLUTION, CONCENTRATE ORAL at 07:21

## 2023-02-10 RX ADMIN — NICOTINE POLACRILEX 2 MG: 2 GUM, CHEWING BUCCAL at 16:26

## 2023-02-10 RX ADMIN — VALPROIC ACID 750 MG: 250 SOLUTION ORAL at 07:21

## 2023-02-10 RX ADMIN — TRAZODONE HYDROCHLORIDE 50 MG: 50 TABLET ORAL at 20:31

## 2023-02-10 RX ADMIN — RISPERIDONE 0.5 MG: 0.5 TABLET, ORALLY DISINTEGRATING ORAL at 07:21

## 2023-02-10 ASSESSMENT — PAIN SCALES - GENERAL: PAINLEVEL_OUTOF10: 4

## 2023-02-10 ASSESSMENT — PAIN DESCRIPTION - LOCATION: LOCATION: GENERALIZED

## 2023-02-10 NOTE — GROUP NOTE
Group Therapy Note    Date: 2/10/2023    Group Start Time: 1430  Group End Time: 0496  Group Topic: Recreational    [facility]    Eurdot Puls    Recreation Group Note        Date: 2/10/2023   Start Time: 1430  End Time: 1435      Number of Participants in Group & Unit Census:  0/7    Topic: Offered patients a variety of group, 1:1, and individual activities for free time or leisure opportunities    Goal of Group:Demonstrate positive use of time; Increase sense of community; Increase socialization; Normalization of the environment      Comments:     Patient did not participate in Recreation group, despite staff encouragement and explanation of benefits. Was resting upon offer for group, and patient awoke. 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 Patient remain seclusive to self. Q15 minute safety checks maintained for patient safety and will continue to encourage patient to attend unit programming.

## 2023-02-10 NOTE — GROUP NOTE
Group Therapy Note    Date: 2/9/2023    Group Start Time: 2000  Group End Time: 2030  Group Topic: Relaxation    [facility]      Group Therapy Note    Attendees: 3/7       Patient refused to attend group this evening, despite encouragement. Patient was offered alternative material, if desired.     Signature:  Jan Xavier RN

## 2023-02-10 NOTE — PROGRESS NOTES
Daily Progress Note  2/10/2023    Patient Name: Arvin Page    CHIEF COMPLAINT:  Acute psychosis          SUBJECTIVE:      Patient is seen today for a follow up assessment. He has been compliant with scheduled medication. Patient has been in behavioral control and not required emergency medication last 24 hours. Upon approach patient is seen in the dayroom eating a meal however nursing staff reports that he has been seclusive to self and out for needs only. He has not been engaging in group programming on the unit. Patient reports stability in mood. He denies any anxiety or depression at this time. Patient reports improvement in suicidal and homicidal ideation, states less severe intense. He reports improvement in perceptual disturbances but does appear to be preoccupied and responding to internal stimulation. Patient continues to endorse delusions that he \"knows\" that he is \"zodiac boy\" and \"Anthony the Gnosticism\". He shows no insight to own mental illness and behaviors that resulted to hospitalization. When asked if finding current medication beneficial patient was unable to disclose but denies any side effects currently. Plan to titrate Risperdal to 1 mg p.o. twice daily with goal to transition to LOMBARDI due to history of noncompliance. Patient not a candidate for lower level of care due to instability symptoms and safety.     Appetite:  [x] Adequate/Unchanged  [] Increased  [] Decreased      Sleep:       [x] Adequate/Unchanged  [] Fair  [] Poor      Group Attendance on Unit:   [] Yes   [] Selectively    [] No    Compliant with scheduled medications: [x] Yes  [] No    Received emergency medications in past 24 hrs: [] Yes   [x] No    Medication Side Effects: Denies         Mental Status Exam  Level of consciousness: Awake and alert  Appearance:  Appropriate attire, standing, fair grooming   Behavior/Motor: Approachable, engages with interviewer, restless, preoccupied   Attitude toward examiner: Cooperative, inattentive, fair eye contact  Speech: Normal rate, volume, and tone. Mood: suspicious  Affect: Flat  Thought processes:  Goal directed, linear  Thought content: Denies suicidal ideations, without current plan or intent, contracts for safety on the unit. Denies homicidal ideations               Denies hallucinations however appears preoccupied and to be responding to internal stimulation              Endorses delusions              Endorses paranoia  Cognition:  Oriented to self, disorganized to location time and situation  Concentration: Distractible  Memory: Poor recent  Insight &Judgment: Poor    Data   height is 5' 11\" (1.803 m) and weight is 163 lb (73.9 kg). His oral temperature is 98 °F (36.7 °C). His blood pressure is 126/72 and his pulse is 86. His respiration is 14. Labs:   Admission on 02/08/2023   Component Date Value Ref Range Status    TSH 02/10/2023 1.44  0.30 - 5.00 uIU/mL Final    WBC 02/10/2023 7.3  3.5 - 11.0 k/uL Final    RBC 02/10/2023 4.55  4.5 - 5.9 m/uL Final    Hemoglobin 02/10/2023 14.1  13.5 - 17.5 g/dL Final    Hematocrit 02/10/2023 43.0  41 - 53 % Final    MCV 02/10/2023 94.5  80 - 100 fL Final    MCH 02/10/2023 31.0  26 - 34 pg Final    MCHC 02/10/2023 32.8  31 - 37 g/dL Final    RDW 02/10/2023 14.1  11.5 - 14.9 % Final    Platelets 78/50/8735 322  150 - 450 k/uL Final    MPV 02/10/2023 7.3  6.0 - 12.0 fL Final         Reviewed patient's current plan of care and vital signs with nursing staff.     Labs reviewed: [x] Yes    Medications  Current Facility-Administered Medications: sertraline (ZOLOFT) 20 MG/ML concentrated solution 100 mg, 100 mg, Oral, Daily  valproic acid (DEPAKENE) 250 MG/5ML oral solution 750 mg, 750 mg, Oral, BID  risperiDONE (RISPERDAL M-TABS) disintegrating tablet 0.5 mg, 0.5 mg, Oral, BID  acetaminophen (TYLENOL) tablet 650 mg, 650 mg, Oral, Q6H PRN  ibuprofen (ADVIL;MOTRIN) tablet 400 mg, 400 mg, Oral, Q6H PRN  hydrOXYzine HCl (ATARAX) tablet 50 mg, 50 mg, Oral, TID PRN  traZODone (DESYREL) tablet 50 mg, 50 mg, Oral, Nightly PRN  polyethylene glycol (GLYCOLAX) packet 17 g, 17 g, Oral, Daily PRN  aluminum & magnesium hydroxide-simethicone (MAALOX) 200-200-20 MG/5ML suspension 30 mL, 30 mL, Oral, Q6H PRN  nicotine polacrilex (NICORETTE) gum 2 mg, 2 mg, Oral, Q2H PRN  haloperidol (HALDOL) tablet 5 mg, 5 mg, Oral, Q6H PRN **AND** LORazepam (ATIVAN) tablet 2 mg, 2 mg, Oral, Q6H PRN  haloperidol lactate (HALDOL) injection 5 mg, 5 mg, IntraMUSCular, Q6H PRN **AND** LORazepam (ATIVAN) injection 2 mg, 2 mg, IntraMUSCular, Q6H PRN **AND** diphenhydrAMINE (BENADRYL) injection 50 mg, 50 mg, IntraMUSCular, Q6H PRN    ASSESSMENT  Acute psychosis ([facility] [address]         PLAN  Patient symptoms are: Some signs of improvement however unstable  Medications Changed Today. A discussion of risks, benefits, and alternatives was held with the patient and this provider with regards to medication changes. After this discussion we mutually agreed to proceed with the medication changes. Titrate Risperdal to 1 mg p.o. twice daily with plan to transition to [address] [place] [address], guardian agreeable  Monitor need and frequency of PRN medications. Encourage participation in groups and milieu. Attempt to develop insight. Psycho-education conducted. Probable discharge is undetermined at this time  Follow-up daily while inpatient. Patient continues to be monitored in the inpatient psychiatric facility at [facility] for safety and stabilization. Patient continues to need, on a daily basis, active treatment furnished directly by or requiring the supervision of inpatient psychiatric personnel. Electronically signed by ROBERTO Contreras CNP on 2/10/2023 at 3:37 PM    **This report has been created using voice recognition software. It may contain minor errors which are inherent in voice recognition technology. **

## 2023-02-10 NOTE — GROUP NOTE
Group Therapy Note    Date: 2/10/2023    Group Start Time: 0900  Group End Time: 0915  Group Topic: Community Meeting    KEYANNA CHRIS    Bri Engel      Group Therapy Note    Attendees: 6/11       Patient's Goal: Patient will verbalize today's goals. Patient will also offer supportive listening and interact with peers to clarify and                 understand clearly set goals. Notes: Patient is making progress AEB participating in group discussion, actively listening, and supporting other group members. Status After Intervention: Improved      Participation Level:  Active Listener and Interactive      Participation Quality: Appropriate, Attentive, Sharing, and Supportive      Speech: normal      Thought Process/Content: Logical, Linear      Affective Functioning: Congruent      Mood: anxious      Level of consciousness: Oriented x4      Response to Learning: Able to verbalize current knowledge/experience, Able to verbalize/acknowledge new learning,      Able to retain information, and Capable of insight      Endings: None Reported      Modes of Intervention: Education, Support, Socialization, and Problem-solving         Discipline Responsible: Behavorial Health Tech      Signature: Bri Engel

## 2023-02-10 NOTE — GROUP NOTE
Group Therapy Note    Date: 2/10/2023    Group Start Time: 1100  Group End Time: 1187  Group Topic: Music Therapy    KEYANNA Sandhu    Music Therapy Group Note        Date: 2/10/2023   Start Time: 1100  End Time: 5904      Number of Participants in Group & Unit Census:  3/10    Topic: Patients shared preferred music and dedicated songs to important people in their lives. Goal of Group: Goals to reflect on relationships; Increase self-expression; Increase sense of community; Normalization of the environment      Comments:     Patient did not participate in Music Therapy group, despite staff encouragement and explanation of benefits. Patient remain seclusive to self. Q15 minute safety checks maintained for patient safety and will continue to encourage patient to attend unit programming.

## 2023-02-10 NOTE — CARE COORDINATION
Social work met with patient to provide one on one talk time. He was exhibiting racing thoughts and non- reality based thought process. Patient did not have any concerns/requests from social work at this time.

## 2023-02-10 NOTE — GROUP NOTE
Group Therapy Note    Date: 2/10/2023    Group Start Time: 1330  Group End Time: 9970  Group Topic: Recreational    KEYANNA Marin    Recreation Group Note        Date: 2/10/2023   Start Time: 1330  End Time: 9956      Number of Participants in Group & Unit Census:  0/7    Topic: Offered patients a variety of group, 1:1, and individual activities for free time or leisure opportunities    Goal of Group:Demonstrate positive use of time; Increase sense of community; Increase socialization; Normalization of the environment      Comments:     Patient did not participate in Recreation group, despite staff encouragement and explanation of benefits. Patient remain seclusive to self. Q15 minute safety checks maintained for patient safety and will continue to encourage patient to attend unit programming.

## 2023-02-10 NOTE — PLAN OF CARE
Problem: Anxiety  Goal: Will report anxiety at manageable levels  Description: INTERVENTIONS:  1. Administer medication as ordered  2. Teach and rehearse alternative coping skills  3. Provide emotional support with 1:1 interaction with staff  Outcome: Progressing  Patient denies feelings of anxiety but reports fear that his doctor is attempting to murder him. Attempts at injection of doubt are unsuccessful but patient is accepting of talk time and calming activities. Q15min checks for safety. Problem: Depression/Self Harm  Goal: Effect of psychiatric condition will be minimized and patient will be protected from self harm  Description: INTERVENTIONS:  1. Assess impact of patient's symptoms on level of functioning, self care needs and offer support as indicated  2. Assess patient/family knowledge of depression, impact on illness and need for teaching  3. Provide emotional support, presence and reassurance  4. Assess for possible suicidal thoughts or ideation. If patient expresses suicidal thoughts or statements do not leave alone, initiate Suicide Precautions, move to a room close to the nursing station and obtain sitter  5. Initiate consults as appropriate with Mental Health Professional, Spiritual Care, Psychosocial CNS, and consider a recommendation to the LIP for a Psychiatric Consultation  Outcome: Progressing  Safe environment maintained and patient remains free from self-harm. Writer also encouraged patient to attend unit programming and socialize with peers. Agreeable to seeking staff should thoughts to harm self or others arise. Q15min checks for safety. Problem: Psychosis  Goal: Will report no hallucinations or delusions  Description: INTERVENTIONS:  1. Administer medication as  ordered  2. Assist with reality testing to support increasing orientation  3.  Assess if patient's hallucinations or delusions are encouraging self harm or harm to others and intervene as appropriate  Outcome: Progressing  Patient is oriented to self, time and place. He does present with symptoms of altered mental status such as paranoid and persecutory delusions that his doctor is attempting to murder him. Patient is not accepting of reality reorientation but maintains behavioral control. Will continue inject doubt to patients delusions. Q15min checks for safety and reorientation.

## 2023-02-10 NOTE — PLAN OF CARE
Problem: Anxiety  Goal: Will report anxiety at manageable levels  Description: INTERVENTIONS:  1. Administer medication as ordered  2. Teach and rehearse alternative coping skills  3. Provide emotional support with 1:1 interaction with staff  2/9/2023 2051 by Viet Stewart RN  Outcome: Progressing     Problem: Depression/Self Harm  Goal: Effect of psychiatric condition will be minimized and patient will be protected from self harm  Description: INTERVENTIONS:  1. Assess impact of patient's symptoms on level of functioning, self care needs and offer support as indicated  2. Assess patient/family knowledge of depression, impact on illness and need for teaching  3. Provide emotional support, presence and reassurance  4. Assess for possible suicidal thoughts or ideation. If patient expresses suicidal thoughts or statements do not leave alone, initiate Suicide Precautions, move to a room close to the nursing station and obtain sitter  5. Initiate consults as appropriate with Mental Health Professional, Spiritual Care, Psychosocial CNS, and consider a recommendation to the LIP for a Psychiatric Consultation  2/9/2023 2051 by Viet Stewart RN  Outcome: Progressing     Problem: Psychosis  Goal: Will report no hallucinations or delusions  Description: INTERVENTIONS:  1. Administer medication as  ordered  2. Assist with reality testing to support increasing orientation  3. Assess if patient's hallucinations or delusions are encouraging self harm or harm to others and intervene as appropriate  2/9/2023 2051 by Viet Stewart RN  Outcome: Not Progressing     Problem: Risk for Elopement  Goal: Patient will not exit the unit/facility without proper excort  2/9/2023 2051 by Viet Stewart RN  Outcome: Progressing    Patient is cooperative and remains isolative to his room, except for personal needs. Patient is compliant with scheduled medications and has remained in behavioral control thus far.  Patient's mood is anxious and suspicious, affect is blunted. Patient's thought process is tangential and shows flight of ideas. Patient denies hallucinations, but presents with paranoid and somatic delusional thinking. Patient denies suicidal and homicidal ideation, plan or intent, and remains free of self harm. Patient is not showing exit-seeking behavior. Patient's pain is currently WDL per patient and will continue to be assessed. Safety maintained with every 15 minute checks.

## 2023-02-10 NOTE — PROGRESS NOTES
[facility]    HISTORY AND PHYSICAL EXAMINATION/ CONSULT NOTE            Date:   2/10/2023  Patient name:  Zia Romero  Date of admission:  2/8/2023  3:41 PM  MRN:   [id number]  Account:  [de-identified]  YOB: 1990  PCP:    No primary care provider on file. Room:   [address] [place] [address]  Code Status:    Full Code    Physician Requesting Consult: Kimberly Pike, *    Reason for Consult: History and physical, medical management    Chief Complaint:     No chief complaint on file. Medical management    History Obtained From:     Patient, EMR, nursing staff    History of Present Illness:     78-year-old male admitted for acute psychosis  Medical history significant for hypertension, blood pressure currently controlled without medication    No other significant medical history  Patient denies any chest pain palpitation cough difficulty breathing nausea vomiting diarrhea or urinary symptoms              Past Medical History:     Past Medical History:   Diagnosis Date    Hypertension     Schizophrenia, schizo-affective ([facility] [address]         Past Surgical History:     History reviewed. No pertinent surgical history. Medications Prior to Admission:     Prior to Admission medications    Medication Sig Start Date End Date Taking? Authorizing Provider   sertraline (ZOLOFT) 20 MG/ML concentrated solution Take 100 mg by mouth daily   Yes Historical Provider, MD   valproic acid (DEPAKENE) 250 MG/5ML SOLN oral solution Take 750 mg by mouth 2 times daily   Yes Historical Provider, MD   benztropine (COGENTIN) 1 MG tablet Take 1 mg by mouth 2 times daily as needed (EPS)   Yes Historical Provider, MD   OLANZapine (ZYPREXA) 20 MG tablet Take 20 mg by mouth in the morning and at bedtime   Yes Historical Provider, MD        Allergies:     Duloxetine and Tall ragweed    Social History:     Tobacco:    reports that he has been smoking.  He has never used smokeless tobacco.  Alcohol:      reports no history of alcohol use. Drug Use:  reports current drug use. Drug: Marijuana Candiss Littler). Family History:     History reviewed. No pertinent family history. Review of Systems:     Positive and Negative as described in HPI. Denies any shortness of breath or cough  Denies chest pain or palpitations  Denies abdominal pain, diarrhea vomiting  Denies any new numbness tremors or weakness. 10 point review of systems was negative other than mentioned above    Physical Exam:     /72   Pulse 86   Temp 98 °F (36.7 °C) (Oral)   Resp 14   Ht 5' 11\" (1.803 m)   Wt 163 lb (73.9 kg)   BMI 22.73 kg/m²   Temp (24hrs), Av.1 °F (36.7 °C), Min:98 °F (36.7 °C), Max:98.1 °F (36.7 °C)    No results for input(s): POCGLU in the last 72 hours. No intake or output data in the 24 hours ending 02/10/23 1822    General Appearance:  alert, well appearing, and in no acute distress  Head:  normocephalic, atraumatic. Eye: no icterus, redness, pupils equal and reactive, extraocular eye movements intact, conjunctiva clear  Ear: normal external ear, no discharge, hearing intact  Nose:  no drainage noted  Mouth: mucous membranes moist, poor oral hygiene  Neck: supple, no carotid bruits, thyroid not palpable  Lungs: Bilateral equal air entry, clear to ausculation, no wheezing, rales or rhonchi, normal effort  Cardiovascular: normal rate, regular rhythm, no murmur, gallop, rub.   Abdomen: Soft, nontender, nondistended, normal bowel sounds, no hepatomegaly or splenomegaly  Neurologic: There are no new focal motor or sensory deficits, normal muscle tone and bulk, no abnormal sensation, normal speech, cranial nerves II through XII grossly intact  Skin: No gross lesions, rashes, bruising or bleeding on exposed skin area  Extremities:  No joint swelling, no pedal edema or calf pain with palpation      Investigations:      Laboratory Testing:  Recent Results (from the past 24 hour(s))   TSH w/reflex to FT4    Collection Time: 02/10/23  7:14 AM   Result Value Ref Range    TSH 1.44 0.30 - 5.00 uIU/mL   CBC    Collection Time: 02/10/23  7:14 AM   Result Value Ref Range    WBC 7.3 3.5 - 11.0 k/uL    RBC 4.55 4.5 - 5.9 m/uL    Hemoglobin 14.1 13.5 - 17.5 g/dL    Hematocrit 43.0 41 - 53 %    MCV 94.5 80 - 100 fL    MCH 31.0 26 - 34 pg    MCHC 32.8 31 - 37 g/dL    RDW 14.1 11.5 - 14.9 %    Platelets 086 193 - 608 k/uL    MPV 7.3 6.0 - 12.0 fL       Imaging/Diagonstics:  Recent data reviewed    Assessment :      Primary Problem  Acute psychosis ([facility] [address]    Active Hospital Problems    Diagnosis Date Noted    Acute psychosis ([facility] [F23] 02/08/2023     Priority: Medium       Plan:     Reason for consult: General medical management     Acute psychosis-management per psychiatry  Leukocytosis-WBC 13-no signs symptom of infection-we will repeat labs tomorrow  Other labs and vitals reviewed, will add TSH rule out organic cause    Tsh and cbc re;v  Within normal range  Will sign off      DVT prophylaxis-not required, patient is mobile    Consultations:   Teri Trinidad MD  2/10/2023  6:22 PM    Copy sent to No primary care provider on file. Please note that this chart was generated using voice recognition Dragon dictation software. Although every effort was made to ensure the accuracy of this automated transcription, some errors in transcription may have occurred.

## 2023-02-11 PROCEDURE — 99232 SBSQ HOSP IP/OBS MODERATE 35: CPT

## 2023-02-11 PROCEDURE — 6370000000 HC RX 637 (ALT 250 FOR IP): Performed by: PSYCHIATRY & NEUROLOGY

## 2023-02-11 PROCEDURE — 1240000000 HC EMOTIONAL WELLNESS R&B

## 2023-02-11 PROCEDURE — 6370000000 HC RX 637 (ALT 250 FOR IP)

## 2023-02-11 RX ADMIN — HYDROXYZINE HYDROCHLORIDE 50 MG: 50 TABLET, FILM COATED ORAL at 18:36

## 2023-02-11 RX ADMIN — NICOTINE POLACRILEX 2 MG: 2 GUM, CHEWING BUCCAL at 16:28

## 2023-02-11 RX ADMIN — VALPROIC ACID 750 MG: 250 SOLUTION ORAL at 21:12

## 2023-02-11 RX ADMIN — RISPERIDONE 1 MG: 1 TABLET, ORALLY DISINTEGRATING ORAL at 21:16

## 2023-02-11 RX ADMIN — NICOTINE POLACRILEX 2 MG: 2 GUM, CHEWING BUCCAL at 18:36

## 2023-02-11 RX ADMIN — NICOTINE POLACRILEX 2 MG: 2 GUM, CHEWING BUCCAL at 08:17

## 2023-02-11 NOTE — PROGRESS NOTES
Daily Progress Note  2/11/2023    Patient Name: Carmen Stewart    CHIEF COMPLAINT:  Acute psychosis          SUBJECTIVE:      Patient is seen today for a follow up assessment. He was compliant with scheduled medication yesterday after much encouragement from staff, noncompliant with all scheduled medication this morning do to delusional belief and states that \"Dr. Do not know my right medication\". Assessment patient is seen standing in front of bedroom where he is displays paranoid delusions and still believes that he is \"Anthony the Oumar and zodiac boy\". Also he believes that he is his own guardian and is demanding to be discharged. Writer attempted to educate and redirect however patient disorganized and unable to engage in coherent conversation. He states \"I am an [employer] employee and you are cited for $26.6 million for wrong diagnosis\" and closed the door and not wish to engage with writer. Patient shows poor insight to own behaviors thus warrants further inpatient hospitalization due to instability symptoms and safety. Encourage compliant with scheduled medication and consider to reach out to guardian for compelled treatment if refusal of medication continues. Appetite:  [x] Adequate/Unchanged  [] Increased  [] Decreased      Sleep:       [x] Adequate/Unchanged  [] Fair  [] Poor      Group Attendance on Unit:   [] Yes   [] Selectively    [] No    Compliant with scheduled medications: [x] Yes  [] No    Received emergency medications in past 24 hrs: [] Yes   [x] No    Medication Side Effects: Denies         Mental Status Exam  Level of consciousness: Awake and alert  Appearance:  Appropriate attire, standing, fair grooming   Behavior/Motor: Approachable, engages with interviewer, restless, preoccupied   Attitude toward examiner:  Cooperative, inattentive, fair eye contact  Speech: Normal rate, volume, and tone.   Mood: suspicious  Affect: Flat  Thought processes:  Goal directed, linear  Thought content: Denies suicidal ideations, without current plan or intent, contracts for safety on the unit. Denies homicidal ideations               Denies hallucinations however appears preoccupied and to be responding to internal stimulation              Endorses delusions              Endorses paranoia  Cognition:  Oriented to self, disorganized to location time and situation  Concentration: Distractible  Memory: Poor recent  Insight &Judgment: Poor    Data   height is 5' 11\" (1.803 m) and weight is 163 lb (73.9 kg). His oral temperature is 98 °F (36.7 °C). His blood pressure is 126/72 and his pulse is 86. His respiration is 14. Labs:   Admission on 02/08/2023   Component Date Value Ref Range Status    TSH 02/10/2023 1.44  0.30 - 5.00 uIU/mL Final    WBC 02/10/2023 7.3  3.5 - 11.0 k/uL Final    RBC 02/10/2023 4.55  4.5 - 5.9 m/uL Final    Hemoglobin 02/10/2023 14.1  13.5 - 17.5 g/dL Final    Hematocrit 02/10/2023 43.0  41 - 53 % Final    MCV 02/10/2023 94.5  80 - 100 fL Final    MCH 02/10/2023 31.0  26 - 34 pg Final    MCHC 02/10/2023 32.8  31 - 37 g/dL Final    RDW 02/10/2023 14.1  11.5 - 14.9 % Final    Platelets 42/34/1516 322  150 - 450 k/uL Final    MPV 02/10/2023 7.3  6.0 - 12.0 fL Final         Reviewed patient's current plan of care and vital signs with nursing staff.     Labs reviewed: [x] Yes    Medications  Current Facility-Administered Medications: risperiDONE (RISPERDAL M-TABS) disintegrating tablet 1 mg, 1 mg, Oral, BID  sertraline (ZOLOFT) 20 MG/ML concentrated solution 100 mg, 100 mg, Oral, Daily  valproic acid (DEPAKENE) 250 MG/5ML oral solution 750 mg, 750 mg, Oral, BID  acetaminophen (TYLENOL) tablet 650 mg, 650 mg, Oral, Q6H PRN  ibuprofen (ADVIL;MOTRIN) tablet 400 mg, 400 mg, Oral, Q6H PRN  hydrOXYzine HCl (ATARAX) tablet 50 mg, 50 mg, Oral, TID PRN  traZODone (DESYREL) tablet 50 mg, 50 mg, Oral, Nightly PRN  polyethylene glycol (GLYCOLAX) packet 17 g, 17 g, Oral, Daily PRN  aluminum & magnesium hydroxide-simethicone (MAALOX) 200-200-20 MG/5ML suspension 30 mL, 30 mL, Oral, Q6H PRN  nicotine polacrilex (NICORETTE) gum 2 mg, 2 mg, Oral, Q2H PRN  haloperidol (HALDOL) tablet 5 mg, 5 mg, Oral, Q6H PRN **AND** LORazepam (ATIVAN) tablet 2 mg, 2 mg, Oral, Q6H PRN  haloperidol lactate (HALDOL) injection 5 mg, 5 mg, IntraMUSCular, Q6H PRN **AND** LORazepam (ATIVAN) injection 2 mg, 2 mg, IntraMUSCular, Q6H PRN **AND** diphenhydrAMINE (BENADRYL) injection 50 mg, 50 mg, IntraMUSCular, Q6H PRN    ASSESSMENT  Acute psychosis ([facility] [address]         PLAN  Patient symptoms are: Some signs of improvement however unstable  No Medication Changes Today  2/10/23 Titrated Risperdal to 1 mg p.o. twice daily with plan to transition to [address] [place] [address], guardian agreeable  Monitor need and frequency of PRN medications. Encourage participation in groups and milieu. Attempt to develop insight. Psycho-education conducted. Probable discharge is undetermined at this time  Follow-up daily while inpatient. Encourage compliance with scheduled medication, consider reaching out to guardian for compelled medication if noncompliance continues. Patient continues to be monitored in the inpatient psychiatric facility at [facility] for safety and stabilization. Patient continues to need, on a daily basis, active treatment furnished directly by or requiring the supervision of inpatient psychiatric personnel. Electronically signed by ROBERTO De Leon CNP on 2/11/2023 at 4:16 PM    **This report has been created using voice recognition software. It may contain minor errors which are inherent in voice recognition technology. **

## 2023-02-11 NOTE — PLAN OF CARE
[facility]  Day 3 Interdisciplinary Treatment Plan NOTE    Review Date & Time: 2/11/23 0900    Admission Type:   Admission Type: Involuntary    Reason for admission:  Reason for Admission: Patient has delusional thinking, thinks there's a bomb in the water heater. Patiet has called polcie several times, linked with Zef, and has guardian. Patient currently lives in a group home and has locked patients out of the group home several times. Patients denies suicidal/homicidal ideation.   Estimated Length of Stay:  5-7 days  Estimated Discharge Date Update:   to be determined by physician    PATIENT STRENGTHS:  Patient Strengths:   Patient Strengths and Limitations:Limitations: Unrealistic self-view, External locus of control, Difficulty problem solving/relies on others to help solve problems  Addictive Behavior:Addictive Behavior  In the Past 3 Months, Have You Felt or Has Someone Told You That You Have a Problem With  : None  Medical Problems:  Past Medical History:   Diagnosis Date    Hypertension     Schizophrenia, schizo-affective ([facility] [address]        Risk:  Fall Risk   Roque Scale Roque Scale Score: 23  BVC    Change in scores:  No Changes to plan of Care:  No    Status EXAM:   Mental Status and Behavioral Exam  Normal: No  Level of Assistance: Independent/Self  Facial Expression: Hostile, Exaggerated, Worried  Affect: Unstable  Level of Consciousness: Alert  Frequency of Checks: 4 times per hour, close  Mood:Normal: No  Mood: Anxious, Labile, Suspicious, Angry, Irritable  Motor Activity:Normal: Yes  Eye Contact: Fair  Observed Behavior: Threatening, Agitated, Hostile, Impulsive, Guarded, Preoccupied  Sexual Misconduct History: Current - no  Involved In Any Sexual Misconduct With Others? : No  History of Sexually Inappropriate Behavior When Previously Hospitalized?: No  Uncontrollable/Compulsive Masturbation?: No  Difficulty Controlling Sexual Impulses?: No  Preception: [place] to situation, [place] to time  Attention:Normal: No  Attention: Unable to concentrate  Thought Processes: Tangential  Thought Content:Normal: No  Thought Content: Delusions, Paranoia, Preoccupations  Depression Symptoms: Impaired concentration, Increased irritability, Isolative  Anxiety Symptoms: Generalized, Feelings of doom, Unexplained fears  Deana Symptoms: Flight of ideas, Grandiosity, Labile, Poor judgment, Pressured speech  Hallucinations: None  Delusions: Yes  Delusions: Persecutory, Paranoid, Obsessions  Memory:Normal: No  Memory: Confabulation, Poor recent  Insight and Judgment: No  Insight and Judgment: Poor judgment, Poor insight, Unrealistic    Daily Assessment Last Entry:   Daily Sleep (WDL): Within Defined Limits            Daily Nutrition (WDL): Within Defined Limits  Level of Assistance: Independent/Self    Patient Monitoring:  Frequency of Checks: 4 times per hour, close    Psychiatric Symptoms:   Depression Symptoms  Depression Symptoms: Impaired concentration, Increased irritability, Isolative  Anxiety Symptoms  Anxiety Symptoms: Generalized, Feelings of doom, Unexplained fears  Deana Symptoms  Deana Symptoms: Flight of ideas, Grandiosity, Labile, Poor judgment, Pressured speech          Suicide Risk CSSR-S:  1) Within the past month, have you wished you were dead or wished you could go to sleep and not wake up? : No  2) Have you actually had any thoughts of killing yourself? : No  6) Have you ever done anything, started to do anything, or prepared to do anything to end your life?: No  Change in Result:   Change in Plan of care:        EDUCATION:   Learner Progress Toward Treatment Goals:   Reviewed results and recommendations of this team, Reviewed group plan and strategies, Reviewed signs, symptoms and risk of self harm and violent behavior, Reviewed goals and plan of care    Method:  small group, individual verbal education    Outcome:   Verbalized by patient but needs reinforcement to obtain goals    PATIENT GOALS:  Short term:  patient declined  Long term:  patient declined    PLAN/TREATMENT RECOMMENDATIONS UPDATE:  continue with group therapies, increased socialization, continue planning for after discharge goals, continue with medication compliance    SHORT-TERM GOALS UPDATE:   Time frame for Short-Term Goals:  5-7 days    LONG-TERM GOALS UPDATE:   Time frame for Long-Term Goals:  6 months    Members Present in Team Meeting:   See signature sheet  Lida Balbuena RN

## 2023-02-11 NOTE — BH NOTE
Wrap-Up Group Note        Date: February 10, 2023 Start Time: 8pm  End Time:  2015      Number of Participants in Group & Unit Census:  1/8     Topic: goal review    Goal of Group:sharing      Comments:     Patient did not participate in Wrap-Up group, despite staff encouragement and explanation of benefits. Patient remain seclusive to self. Q15 minute safety checks maintained for patient safety and will continue to encourage patient to attend unit programming.

## 2023-02-11 NOTE — PLAN OF CARE
Problem: Anxiety  Goal: Will report anxiety at manageable levels  Description: INTERVENTIONS:  1. Administer medication as ordered  2. Teach and rehearse alternative coping skills  3. Provide emotional support with 1:1 interaction with staff  2/11/2023 1103 by Yayo Ramirez RN  Outcome: Progressing  Note: Patient denies suicidal ideation, anxiety, and depression. Patient is experiencing delusions about his medication and peers, and is paranoid and guarded. Patient refused all scheduled medications. Problem: Depression/Self Harm  Goal: Effect of psychiatric condition will be minimized and patient will be protected from self harm  Description: INTERVENTIONS:  1. Assess impact of patient's symptoms on level of functioning, self care needs and offer support as indicated  2. Assess patient/family knowledge of depression, impact on illness and need for teaching  3. Provide emotional support, presence and reassurance  4. Assess for possible suicidal thoughts or ideation. If patient expresses suicidal thoughts or statements do not leave alone, initiate Suicide Precautions, move to a room close to the nursing station and obtain sitter  5. Initiate consults as appropriate with Mental Health Professional, Spiritual Care, Psychosocial CNS, and consider a recommendation to the LIP for a Psychiatric Consultation  2/11/2023 1103 by Yayo Ramirez RN  Outcome: Progressing  Note: Patient denies suicidal ideation, anxiety, and depression. Patient is experiencing delusions about his medication and peers, and is paranoid and guarded. Patient refused all scheduled medications. Problem: Psychosis  Goal: Will report no hallucinations or delusions  Description: INTERVENTIONS:  1. Administer medication as  ordered  2. Assist with reality testing to support increasing orientation  3.  Assess if patient's hallucinations or delusions are encouraging self harm or harm to others and intervene as appropriate  2/11/2023 1103 by Tonie Dominique RN  Outcome: Progressing  Note: Patient denies suicidal ideation, anxiety, and depression. Patient is experiencing delusions about his medication and peers, and is paranoid and guarded. Patient refused all scheduled medications. Problem: Risk for Elopement  Goal: Patient will not exit the unit/facility without proper excort  2/11/2023 1103 by Tonie Dominique RN  Outcome: Progressing  Note: Patient denies suicidal ideation, anxiety, and depression. Patient is experiencing delusions about his medication and peers, and is paranoid and guarded. Patient refused all scheduled medications. Problem: Anxiety  Goal: Will report anxiety at manageable levels  Description: INTERVENTIONS:  1. Administer medication as ordered  2. Teach and rehearse alternative coping skills  3. Provide emotional support with 1:1 interaction with staff  2/11/2023 1103 by Tonie Dominique RN  Outcome: Progressing  Note: Patient denies suicidal ideation, anxiety, and depression. Patient is experiencing delusions about his medication and peers, and is paranoid and guarded. Patient refused all scheduled medications.    2/11/2023 0936 by Greg Fountain RN  Outcome: Progressing  2/11/2023 0921 by Greg Fountain RN  Outcome: Progressing  2/10/2023 2117 by Rola Balbuena  Outcome: Not Progressing

## 2023-02-11 NOTE — PLAN OF CARE
Problem: Anxiety  Goal: Will report anxiety at manageable levels  Description: INTERVENTIONS:  1. Administer medication as ordered  2. Teach and rehearse alternative coping skills  3. Provide emotional support with 1:1 interaction with staff  2/10/2023 2117 by Marbella Quinteros  Outcome: Not Progressing  Pt remains isolative to room. He is easily agitated upon rounds & any contact. Problem: Depression/Self Harm  Goal: Effect of psychiatric condition will be minimized and patient will be protected from self harm  Description: INTERVENTIONS:  1. Assess impact of patient's symptoms on level of functioning, self care needs and offer support as indicated  2. Assess patient/family knowledge of depression, impact on illness and need for teaching  3. Provide emotional support, presence and reassurance  4. Assess for possible suicidal thoughts or ideation. If patient expresses suicidal thoughts or statements do not leave alone, initiate Suicide Precautions, move to a room close to the nursing station and obtain sitter  5. Initiate consults as appropriate with Mental Health Professional, Spiritual Care, Psychosocial CNS, and consider a recommendation to the [facility]IP for a Psychiatric Consultation  2/10/2023 2117 by Marbella Quinteros  Outcome: Progressing   Pt denies suicidal ideation, no self harm behaviors exhibited. Pt refused talk time, refused vitals. Pt was compliant with medicine, refused HS snack. Pt remains paranoid, & suspicious. Problem: Psychosis  Goal: Will report no hallucinations or delusions  Description: INTERVENTIONS:  1. Administer medication as  ordered  2. Assist with reality testing to support increasing orientation  3. Assess if patient's hallucinations or delusions are encouraging self harm or harm to others and intervene as appropriate  2/10/2023 2117 by Marbella Quinteros  Outcome: Progressing  Pt denies hallucinations.  She continues to be paranoid, suspicious, & unable to maintain a reality based conversation. Pt refused group attendance. Remains agitated on approach.  Mood is Labile

## 2023-02-11 NOTE — PLAN OF CARE
[address] [facility]  Initial Interdisciplinary Treatment Plan NO      Original treatment plan Date & Time: 2/11/23 0900    Admission Type:  Admission Type: Involuntary    Reason for admission:   Reason for Admission: Patient has delusional thinking, thinks there's a bomb in the water heater. Patiet has called polcie several times, linked with Zef, and has guardian. Patient currently lives in a group home and has locked patients out of the group home several times. Patients denies suicidal/homicidal ideation.     Estimated Length of Stay:  5-7days  Estimated Discharge Date: to be determined by physician    PATIENT STRENGTHS:  Patient Strengths:   Patient Strengths and Limitations:Limitations: Unrealistic self-view, External locus of control, Difficulty problem solving/relies on others to help solve problems  Addictive Behavior: Addictive Behavior  In the Past 3 Months, Have You Felt or Has Someone Told You That You Have a Problem With  : None  Medical Problems:  Past Medical History:   Diagnosis Date    Hypertension     Schizophrenia, schizo-affective ([facility] [address]      Status EXAM:Mental Status and Behavioral Exam  Normal: No  Level of Assistance: Independent/Self  Facial Expression: Hostile, Exaggerated, Worried  Affect: Unstable  Level of Consciousness: Alert  Frequency of Checks: 4 times per hour, close  Mood:Normal: No  Mood: Anxious, Labile, Suspicious, Angry, Irritable  Motor Activity:Normal: Yes  Eye Contact: Fair  Observed Behavior: Threatening, Agitated, Hostile, Impulsive, Guarded, Preoccupied  Sexual Misconduct History: Current - no  Involved In Any Sexual Misconduct With Others? : No  History of Sexually Inappropriate Behavior When Previously Hospitalized?: No  Uncontrollable/Compulsive Masturbation?: No  Difficulty Controlling Sexual Impulses?: No  Preception: [place] to situation, [place] to time  Attention:Normal: No  Attention: Unable to concentrate  Thought Processes: Tangential  Thought Content:Normal: No  Thought Content: Delusions, Paranoia, Preoccupations  Depression Symptoms: Impaired concentration, Increased irritability, Isolative  Anxiety Symptoms: Generalized, Feelings of doom, Unexplained fears  Deana Symptoms: Flight of ideas, Grandiosity, Labile, Poor judgment, Pressured speech  Hallucinations: None  Delusions: Yes  Delusions: Persecutory, Paranoid, Obsessions  Memory:Normal: No  Memory: Confabulation, Poor recent  Insight and Judgment: No  Insight and Judgment: Poor judgment, Poor insight, Unrealistic    EDUCATION:   Learner Progress Toward Treatment Goals: reviewed group plans and strategies for care    Method:group therapy, medication compliance, individualized assessments and care planning    Outcome: needs reinforcement    PATIENT GOALS: to be discussed with patient within 72 hours    PLAN/TREATMENT RECOMMENDATIONS:     continue group therapy , medications compliance, goal setting, individualized assessments and care, continue to monitor pt on unit      SHORT-TERM GOALS: Patient declined  Time frame for Short-Term Goals: 5-7 days    LONG-TERM GOALS:Patient declined  Time frame for Long-Term Goals: 6 months  Members Present in Team Meeting: See Signature Sheet    Brit Rey RN

## 2023-02-12 PROCEDURE — 1240000000 HC EMOTIONAL WELLNESS R&B

## 2023-02-12 PROCEDURE — 99232 SBSQ HOSP IP/OBS MODERATE 35: CPT | Performed by: NURSE PRACTITIONER

## 2023-02-12 PROCEDURE — 6370000000 HC RX 637 (ALT 250 FOR IP)

## 2023-02-12 PROCEDURE — 6370000000 HC RX 637 (ALT 250 FOR IP): Performed by: PSYCHIATRY & NEUROLOGY

## 2023-02-12 RX ADMIN — IBUPROFEN 400 MG: 400 TABLET, FILM COATED ORAL at 16:54

## 2023-02-12 RX ADMIN — VALPROIC ACID 750 MG: 250 SOLUTION ORAL at 09:00

## 2023-02-12 RX ADMIN — SERTRALINE HYDROCHLORIDE 100 MG: 20 SOLUTION, CONCENTRATE ORAL at 09:00

## 2023-02-12 RX ADMIN — NICOTINE POLACRILEX 2 MG: 2 GUM, CHEWING BUCCAL at 16:55

## 2023-02-12 RX ADMIN — RISPERIDONE 1 MG: 1 TABLET, ORALLY DISINTEGRATING ORAL at 09:00

## 2023-02-12 RX ADMIN — NICOTINE POLACRILEX 2 MG: 2 GUM, CHEWING BUCCAL at 07:32

## 2023-02-12 ASSESSMENT — LIFESTYLE VARIABLES
HOW MANY STANDARD DRINKS CONTAINING ALCOHOL DO YOU HAVE ON A TYPICAL DAY: PATIENT DOES NOT DRINK
HOW OFTEN DO YOU HAVE A DRINK CONTAINING ALCOHOL: NEVER
HOW OFTEN DO YOU HAVE A DRINK CONTAINING ALCOHOL: NEVER
HOW MANY STANDARD DRINKS CONTAINING ALCOHOL DO YOU HAVE ON A TYPICAL DAY: PATIENT DOES NOT DRINK

## 2023-02-12 ASSESSMENT — PAIN SCALES - GENERAL: PAINLEVEL_OUTOF10: 7

## 2023-02-12 NOTE — PLAN OF CARE
Problem: Anxiety  Goal: Will report anxiety at manageable levels  Description: INTERVENTIONS:  1. Administer medication as ordered  2. Teach and rehearse alternative coping skills  3. Provide emotional support with 1:1 interaction with staff  2/11/2023 2201 by Llana Cowden, RN  Outcome: Progressing     Problem: Psychosis  Goal: Will report no hallucinations or delusions  Description: INTERVENTIONS:  1. Administer medication as  ordered  2. Assist with reality testing to support increasing orientation  3. Assess if patient's hallucinations or delusions are encouraging self harm or harm to others and intervene as appropriate  2/11/2023 2201 by Llana Cowden, RN  Outcome: Progressing   Patient is paranoid. Patient is anxious. Patient is isolative to room except for needs. Patient is compliant with his medications. Problem: Depression/Self Harm  Goal: Effect of psychiatric condition will be minimized and patient will be protected from self harm  Description: INTERVENTIONS:  1. Assess impact of patient's symptoms on level of functioning, self care needs and offer support as indicated  2. Assess patient/family knowledge of depression, impact on illness and need for teaching  3. Provide emotional support, presence and reassurance  4. Assess for possible suicidal thoughts or ideation. If patient expresses suicidal thoughts or statements do not leave alone, initiate Suicide Precautions, move to a room close to the nursing station and obtain sitter  5. Initiate consults as appropriate with Mental Health Professional, Spiritual Care, Psychosocial CNS, and consider a recommendation to the LIP for a Psychiatric Consultation  2/11/2023 2201 by Llana Cowden, RN  Outcome: Progressing   Patient denies thoughts of self harm. Patient remains free from harm.

## 2023-02-12 NOTE — GROUP NOTE
Group Therapy Note    Date: 2/12/2023    Group Start Time: 0900  Group End Time: 0915  Group Topic: Group Documentation    [facility]    Rihc Ceballos RN        Group Therapy Note    Attendees: 4/8       Patient's Goal:  Work towards discharge    Notes:  Goals group    Status After Intervention:  Unchanged    Participation Level:  Active Listener and Interactive    Participation Quality: Appropriate, Attentive, and Sharing      Speech:  normal      Thought Process/Content: Delusional      Affective Functioning: Congruent      Mood: elevated      Level of consciousness:  Alert and Attentive      Response to Learning: Able to verbalize current knowledge/experience, Able to verbalize/acknowledge new learning, and Able to retain information      Endings: None Reported    Modes of Intervention: Support, Socialization, and Exploration      Discipline Responsible: [employer]      Signature:  Rich Ceballos RN

## 2023-02-12 NOTE — GROUP NOTE
Group Therapy Note    Date: 2/12/2023    Group Start Time: 1105  Group End Time: 4358  Group Topic: Cognitive Skills    [facility]    Mannie Osuna    Cognitive Skills Group Note        Date: February 12, 2023 Start Time:  11:05 am    End Time:  11:40 am      Number of Participants in Group & Unit Census:  3/8    Topic: Cognitive skills, communication, interpersonal interactions    Goal of Group: To improve cognitive functioning through improved concentration/focus, decision making and turn taking abilities. To increase interpersonal interaction and communication with others. Comments:     Patient did not participate in Cognitive Skills group, despite staff encouragement and explanation of benefits. Patient remain seclusive to self. Q15 minute safety checks maintained for patient safety and will continue to encourage patient to attend unit programming.           Signature:  Mannie Osuna

## 2023-02-12 NOTE — PLAN OF CARE
Problem: Anxiety  Goal: Will report anxiety at manageable levels  Description: INTERVENTIONS:  1. Administer medication as ordered  2. Teach and rehearse alternative coping skills  3. Provide emotional support with 1:1 interaction with staff  2/12/2023 5976 by David Bean RN  Outcome: Progressing     Problem: Depression/Self Harm  Goal: Effect of psychiatric condition will be minimized and patient will be protected from self harm  2/12/2023 0816 by David Bean RN  Outcome: Progressing     Patient is open to 1:1 talk time with staff. Patient denies depression, anxiety, suicidal ideations, homicidal ideations, auditory hallucinations, and visual hallucinations. Patient is out and isolative to himself in the day area and attends groups. Patient eats his meals and is medication compliant.

## 2023-02-12 NOTE — PROGRESS NOTES
Daily Progress Note  2/12/2023    Patient Name: Azul Dennis    CHIEF COMPLAINT:  Acute psychosis          SUBJECTIVE:    Patient was seen for follow-up assessment today. He has been compliant with 2 consecutive doses of Risperdal M tabs, with most recent dose this morning. He refused scheduled Zoloft yesterday but took it today. He has also been compliant with 2 consecutive doses of Depakene once again after refusing yesterday morning. He has not required any emergency medications this admission. Nursing staff report patient continues to present with delusions and has been mostly isolative to his room coming out for needs, meals, and meds only. He has been cooperative. Patient was approached in his room where he was found to be resting, but awake. He was agreeable to brief conversation. He made no eye contact with writer. Writer inquired as to patient's current mood and he replied with \"well I am tired of the government in here following me around\". He then continued to express multiple government conspiracy theories and persecutory delusions. He believes he has being blackmailed by the government. He denied any suicidal or homicidal thoughts. He describes sleep and appetite is adequate. At this time patient has yet to demonstrate stability and warrants further hospitalization for safety and stabilization.     Appetite:  [x] Adequate/Unchanged  [] Increased  [] Decreased      Sleep:       [x] Adequate/Unchanged  [] Fair  [] Poor      Group Attendance on Unit:   [] Yes   [x] Selectively    [] No    Compliant with scheduled medications: [x] Yes  [] No    Received emergency medications in past 24 hrs: [] Yes   [x] No    Medication Side Effects: Denies         Mental Status Exam  Level of consciousness: Awake and alert  Appearance:  Appropriate attire, resting in bed, fair grooming   Behavior/Motor: Approachable, engages with interviewer, restless, preoccupied   Attitude toward examiner: Cooperative, inattentive, poor eye contact  Speech: Normal rate, increased volume, and irritable tone. Mood: suspicious, irritable  Affect: Flat  Thought processes:  Goal directed, linear, perseverative  Thought content: Denies suicidal ideations               Denies homicidal ideations               Denies hallucinations however appears preoccupied and to be responding to internal stimulation              Endorses delusions              Endorses paranoia  Cognition:  Oriented to self, disorganized to location, time, and situation  Concentration: Distractible  Memory: Poor recent  Insight & Judgment: Poor    Data   height is 5' 11\" (1.803 m) and weight is 163 lb (73.9 kg). His oral temperature is 97.6 °F (36.4 °C). His blood pressure is 132/64 and his pulse is 99. His respiration is 14. Labs:   Admission on 02/08/2023   Component Date Value Ref Range Status    TSH 02/10/2023 1.44  0.30 - 5.00 uIU/mL Final    WBC 02/10/2023 7.3  3.5 - 11.0 k/uL Final    RBC 02/10/2023 4.55  4.5 - 5.9 m/uL Final    Hemoglobin 02/10/2023 14.1  13.5 - 17.5 g/dL Final    Hematocrit 02/10/2023 43.0  41 - 53 % Final    MCV 02/10/2023 94.5  80 - 100 fL Final    MCH 02/10/2023 31.0  26 - 34 pg Final    MCHC 02/10/2023 32.8  31 - 37 g/dL Final    RDW 02/10/2023 14.1  11.5 - 14.9 % Final    Platelets 49/35/5076 322  150 - 450 k/uL Final    MPV 02/10/2023 7.3  6.0 - 12.0 fL Final         Reviewed patient's current plan of care and vital signs with nursing staff.     Labs reviewed: [x] Yes    Medications  Current Facility-Administered Medications: risperiDONE (RISPERDAL M-TABS) disintegrating tablet 1 mg, 1 mg, Oral, BID  sertraline (ZOLOFT) 20 MG/ML concentrated solution 100 mg, 100 mg, Oral, Daily  valproic acid (DEPAKENE) 250 MG/5ML oral solution 750 mg, 750 mg, Oral, BID  acetaminophen (TYLENOL) tablet 650 mg, 650 mg, Oral, Q6H PRN  ibuprofen (ADVIL;MOTRIN) tablet 400 mg, 400 mg, Oral, Q6H PRN  hydrOXYzine HCl (ATARAX) tablet 50 mg, 50 mg, Oral, TID PRN  traZODone (DESYREL) tablet 50 mg, 50 mg, Oral, Nightly PRN  polyethylene glycol (GLYCOLAX) packet 17 g, 17 g, Oral, Daily PRN  aluminum & magnesium hydroxide-simethicone (MAALOX) 200-200-20 MG/5ML suspension 30 mL, 30 mL, Oral, Q6H PRN  nicotine polacrilex (NICORETTE) gum 2 mg, 2 mg, Oral, Q2H PRN  haloperidol (HALDOL) tablet 5 mg, 5 mg, Oral, Q6H PRN **AND** LORazepam (ATIVAN) tablet 2 mg, 2 mg, Oral, Q6H PRN  haloperidol lactate (HALDOL) injection 5 mg, 5 mg, IntraMUSCular, Q6H PRN **AND** LORazepam (ATIVAN) injection 2 mg, 2 mg, IntraMUSCular, Q6H PRN **AND** diphenhydrAMINE (BENADRYL) injection 50 mg, 50 mg, IntraMUSCular, Q6H PRN    ASSESSMENT  Acute psychosis ([facility] [address]         PLAN  Patient symptoms are: Showing minimal improvement  Medications Changed Today. A discussion of risks, benefits, and alternatives was held with the patient and this provider with regards to medication changes. After this discussion we mutually agreed to proceed with the medication changes. Titrate Risperdal M tabs to 1.5 mg by mouth 2 times a day  Monitor need and frequency of PRN medications. Encourage participation in groups and milieu. Attempt to develop insight. Psycho-education conducted. Probable discharge is undetermined at this time  Follow-up daily while inpatient. Encourage compliance with scheduled medication, consider reaching out to guardian for compelled medication if noncompliance continues. Patient continues to be monitored in the inpatient psychiatric facility at [facility] for safety and stabilization. Patient continues to need, on a daily basis, active treatment furnished directly by or requiring the supervision of inpatient psychiatric personnel. Electronically signed by ROBERTO Morris CNP on 2/12/2023 at 4:11 PM    **This report has been created using voice recognition software. It may contain minor errors which are inherent in voice recognition technology. **

## 2023-02-13 PROCEDURE — 6370000000 HC RX 637 (ALT 250 FOR IP): Performed by: NURSE PRACTITIONER

## 2023-02-13 PROCEDURE — 6370000000 HC RX 637 (ALT 250 FOR IP): Performed by: PSYCHIATRY & NEUROLOGY

## 2023-02-13 PROCEDURE — 1240000000 HC EMOTIONAL WELLNESS R&B

## 2023-02-13 PROCEDURE — 6370000000 HC RX 637 (ALT 250 FOR IP)

## 2023-02-13 RX ADMIN — NICOTINE POLACRILEX 2 MG: 2 GUM, CHEWING BUCCAL at 07:25

## 2023-02-13 RX ADMIN — IBUPROFEN 400 MG: 400 TABLET, FILM COATED ORAL at 20:43

## 2023-02-13 RX ADMIN — SERTRALINE HYDROCHLORIDE 100 MG: 20 SOLUTION, CONCENTRATE ORAL at 09:31

## 2023-02-13 RX ADMIN — NICOTINE POLACRILEX 2 MG: 2 GUM, CHEWING BUCCAL at 16:53

## 2023-02-13 RX ADMIN — RISPERIDONE 1.5 MG: 1 TABLET, ORALLY DISINTEGRATING ORAL at 20:43

## 2023-02-13 RX ADMIN — HYDROXYZINE HYDROCHLORIDE 50 MG: 50 TABLET, FILM COATED ORAL at 20:46

## 2023-02-13 RX ADMIN — VALPROIC ACID 750 MG: 250 SOLUTION ORAL at 20:43

## 2023-02-13 RX ADMIN — TRAZODONE HYDROCHLORIDE 50 MG: 50 TABLET ORAL at 20:43

## 2023-02-13 RX ADMIN — VALPROIC ACID 750 MG: 250 SOLUTION ORAL at 09:30

## 2023-02-13 RX ADMIN — RISPERIDONE 1.5 MG: 1 TABLET, ORALLY DISINTEGRATING ORAL at 09:31

## 2023-02-13 ASSESSMENT — PAIN DESCRIPTION - LOCATION: LOCATION: BACK

## 2023-02-13 ASSESSMENT — LIFESTYLE VARIABLES
HOW OFTEN DO YOU HAVE A DRINK CONTAINING ALCOHOL: NEVER
HOW MANY STANDARD DRINKS CONTAINING ALCOHOL DO YOU HAVE ON A TYPICAL DAY: PATIENT DOES NOT DRINK
HOW OFTEN DO YOU HAVE A DRINK CONTAINING ALCOHOL: NEVER
HOW MANY STANDARD DRINKS CONTAINING ALCOHOL DO YOU HAVE ON A TYPICAL DAY: PATIENT DOES NOT DRINK

## 2023-02-13 ASSESSMENT — PAIN SCALES - GENERAL: PAINLEVEL_OUTOF10: 8

## 2023-02-13 NOTE — GROUP NOTE
Group Therapy Note    Date: 2/13/2023    Group Start Time: 1100  Group End Time: 36  Group Topic: Cognitive Skills    [facility] SANJANA Pham, CTRS    Cognitive Skills Group Note        Date: February 13, 2023 Start Time: 11am  End Time: 11:30am      Number of Participants in Group & Unit Census:  4/9    Topic: cognitive skills     Goal of Group: pt will demonstrate improved social skills and improved decision making skilsl       Comments:     Patient did not participate in Cognitive Skills group, despite staff encouragement and explanation of benefits. Patient remain seclusive to self. Q15 minute safety checks maintained for patient safety and will continue to encourage patient to attend unit programming.             Signature:  Teresa Blandon

## 2023-02-13 NOTE — PLAN OF CARE
Problem: Anxiety  Goal: Will report anxiety at manageable levels  Description: INTERVENTIONS:  1. Administer medication as ordered  2. Teach and rehearse alternative coping skills  3. Provide emotional support with 1:1 interaction with staff  2/12/2023 2219 by Marcus Hernandez RN  Outcome: Progressing  Note: Patient denies suicidal ideation, anxiety, and depression. Patient refused medication, stating he wants \"real medicine\" and won't be taking what he's prescribed anymore. Problem: Depression/Self Harm  Goal: Effect of psychiatric condition will be minimized and patient will be protected from self harm  Description: INTERVENTIONS:  1. Assess impact of patient's symptoms on level of functioning, self care needs and offer support as indicated  2. Assess patient/family knowledge of depression, impact on illness and need for teaching  3. Provide emotional support, presence and reassurance  4. Assess for possible suicidal thoughts or ideation. If patient expresses suicidal thoughts or statements do not leave alone, initiate Suicide Precautions, move to a room close to the nursing station and obtain sitter  5. Initiate consults as appropriate with Mental Health Professional, Spiritual Care, Psychosocial CNS, and consider a recommendation to the LIP for a Psychiatric Consultation  2/12/2023 2219 by Marcus Hernandez RN  Outcome: Progressing  Note: Patient denies suicidal ideation, anxiety, and depression. Patient refused medication, stating he wants \"real medicine\" and won't be taking what he's prescribed anymore. Problem: Psychosis  Goal: Will report no hallucinations or delusions  Description: INTERVENTIONS:  1. Administer medication as  ordered  2. Assist with reality testing to support increasing orientation  3.  Assess if patient's hallucinations or delusions are encouraging self harm or harm to others and intervene as appropriate  Outcome: Progressing  Note: Patient denies suicidal ideation, anxiety, and depression. Patient refused medication, stating he wants \"real medicine\" and won't be taking what he's prescribed anymore. Problem: Risk for Elopement  Goal: Patient will not exit the unit/facility without proper excort  Outcome: Progressing  Note: Patient denies suicidal ideation, anxiety, and depression. Patient refused medication, stating he wants \"real medicine\" and won't be taking what he's prescribed anymore.

## 2023-02-13 NOTE — GROUP NOTE
Group Therapy Note    Date: 2/13/2023    Group Start Time: 1400  Group End Time: 6678  Group Topic: Recreational    [facility] SANJANA Pham, CTRS    Recreation Group Note        Date: February 13, 2023 Start Time: 2pm  End Time:  235pm      Number of Participants in Group & Unit Census:  2/8    Topic: recreation therapy group     Goal of Group: to improve concentration / improve communication skills       Comments:     Patient did not participate in Recreation group, despite staff encouragement and explanation of benefits. Patient remain seclusive to self. Q15 minute safety checks maintained for patient safety and will continue to encourage patient to attend unit programming.               Signature:  Teresa Blandon

## 2023-02-13 NOTE — PLAN OF CARE
Problem: Anxiety  Goal: Will report anxiety at manageable levels  Description: INTERVENTIONS:  1. Administer medication as ordered  2. Teach and rehearse alternative coping skills  3. Provide emotional support with 1:1 interaction with staff  2/13/2023 0807 by Lowanda Nyhan, RN  Outcome: Progressing  Note: Patient denies suicidal ideation, anxiety, and depression. Patient was cooperative and accepted medications. Problem: Depression/Self Harm  Goal: Effect of psychiatric condition will be minimized and patient will be protected from self harm  Description: INTERVENTIONS:  1. Assess impact of patient's symptoms on level of functioning, self care needs and offer support as indicated  2. Assess patient/family knowledge of depression, impact on illness and need for teaching  3. Provide emotional support, presence and reassurance  4. Assess for possible suicidal thoughts or ideation. If patient expresses suicidal thoughts or statements do not leave alone, initiate Suicide Precautions, move to a room close to the nursing station and obtain sitter  5. Initiate consults as appropriate with Mental Health Professional, Spiritual Care, Psychosocial CNS, and consider a recommendation to the LIP for a Psychiatric Consultation  2/13/2023 0807 by Lowanda Nyhan, RN  Outcome: Progressing  Note: Patient denies suicidal ideation, anxiety, and depression. Patient was cooperative and accepted medications. Problem: Psychosis  Goal: Will report no hallucinations or delusions  Description: INTERVENTIONS:  1. Administer medication as  ordered  2. Assist with reality testing to support increasing orientation  3. Assess if patient's hallucinations or delusions are encouraging self harm or harm to others and intervene as appropriate  2/13/2023 0807 by Lowanda Nyhan, RN  Outcome: Progressing  Note: Patient denies suicidal ideation, anxiety, and depression. Patient was cooperative and accepted medications.       Problem: Risk for Elopement  Goal: Patient will not exit the unit/facility without proper excort  2/13/2023 0807 by Isabell Durant RN  Outcome: Progressing  Note: Patient denies suicidal ideation, anxiety, and depression. Patient was cooperative and accepted medications.

## 2023-02-14 PROCEDURE — 1240000000 HC EMOTIONAL WELLNESS R&B

## 2023-02-14 PROCEDURE — 6370000000 HC RX 637 (ALT 250 FOR IP): Performed by: PSYCHIATRY & NEUROLOGY

## 2023-02-14 PROCEDURE — 6370000000 HC RX 637 (ALT 250 FOR IP)

## 2023-02-14 PROCEDURE — 6370000000 HC RX 637 (ALT 250 FOR IP): Performed by: NURSE PRACTITIONER

## 2023-02-14 RX ORDER — RISPERIDONE 2 MG/1
2 TABLET, ORALLY DISINTEGRATING ORAL 2 TIMES DAILY
Status: DISCONTINUED | OUTPATIENT
Start: 2023-02-14 | End: 2023-02-16 | Stop reason: HOSPADM

## 2023-02-14 RX ADMIN — SERTRALINE HYDROCHLORIDE 100 MG: 20 SOLUTION, CONCENTRATE ORAL at 08:37

## 2023-02-14 RX ADMIN — NICOTINE POLACRILEX 2 MG: 2 GUM, CHEWING BUCCAL at 18:31

## 2023-02-14 RX ADMIN — VALPROIC ACID 750 MG: 250 SOLUTION ORAL at 08:34

## 2023-02-14 RX ADMIN — RISPERIDONE 2 MG: 2 TABLET, ORALLY DISINTEGRATING ORAL at 20:59

## 2023-02-14 RX ADMIN — NICOTINE POLACRILEX 2 MG: 2 GUM, CHEWING BUCCAL at 14:57

## 2023-02-14 RX ADMIN — VALPROIC ACID 750 MG: 250 SOLUTION ORAL at 20:50

## 2023-02-14 RX ADMIN — RISPERIDONE 1.5 MG: 1 TABLET, ORALLY DISINTEGRATING ORAL at 08:37

## 2023-02-14 RX ADMIN — IBUPROFEN 400 MG: 400 TABLET, FILM COATED ORAL at 16:46

## 2023-02-14 RX ADMIN — NICOTINE POLACRILEX 2 MG: 2 GUM, CHEWING BUCCAL at 16:46

## 2023-02-14 RX ADMIN — IBUPROFEN 400 MG: 400 TABLET, FILM COATED ORAL at 07:41

## 2023-02-14 RX ADMIN — TRAZODONE HYDROCHLORIDE 50 MG: 50 TABLET ORAL at 20:50

## 2023-02-14 RX ADMIN — NICOTINE POLACRILEX 2 MG: 2 GUM, CHEWING BUCCAL at 07:41

## 2023-02-14 ASSESSMENT — PAIN SCALES - GENERAL
PAINLEVEL_OUTOF10: 6
PAINLEVEL_OUTOF10: 6

## 2023-02-14 ASSESSMENT — PAIN DESCRIPTION - LOCATION
LOCATION: BACK
LOCATION: BACK

## 2023-02-14 NOTE — PROGRESS NOTES
Daily Progress Note  2/13/2023    Patient Name: Megan Christopher    CHIEF COMPLAINT:  Acute psychosis          SUBJECTIVE:    Patient was seen for follow-up assessment today. When approached for interview patient continues to present as bizarre and delusional endorsing multiple paranoid beliefs. Patient continues to discuss fears about a bomb being in the water heater and presents with flight of ideas beginning to talk about an IV being flushed through him causing \"black stuff\" to come out of his digestive tract. Patient went on to discuss an injury he had to his back when he was 11years old unrelated to conversation. Patient does state medications have been beneficial to reduce some of the intensity of his paranoia however states it continues and that he will contract for safety outside the hospital.  Patient states his sleep has improved somewhat today. Patient is denying auditory and visual hallucinations however appears to be responding to internal stimuli. Patient is not appropriate for lower level of care at this time. Appetite:  [x] Adequate/Unchanged  [] Increased  [] Decreased      Sleep:       [x] Adequate/Unchanged  [] Fair  [] Poor      Group Attendance on Unit:   [] Yes   [x] Selectively    [] No    Compliant with scheduled medications: [x] Yes  [] No    Received emergency medications in past 24 hrs: [] Yes   [x] No    Medication Side Effects: Denies         Mental Status Exam  Level of consciousness: Awake and alert  Appearance:  Appropriate attire, resting in bed, fair grooming   Behavior/Motor: Approachable, engages with interviewer, restless, preoccupied   Attitude toward examiner:  Cooperative, attentive, poor eye contact  Speech: Normal rate, volume, and tone.   Mood: \"Pretty good\"  Affect: Paranoid, reactive  Thought processes:  Goal directed, linear, perseverative, flight of ideas noted  Thought content: Denies suicidal ideations               Denies homicidal ideations Denies hallucinations however appears preoccupied and to be responding to internal stimulation              Endorses delusions              Endorses paranoia  Cognition:  Oriented to self, disorganized to location, time, and situation  Concentration: Distractible  Memory: Poor recent  Insight & Judgment: Poor    Data   height is 5' 11\" (1.803 m) and weight is 163 lb (73.9 kg). His oral temperature is 97.9 °F (36.6 °C). His blood pressure is 107/60 and his pulse is 91. His respiration is 14. Labs:   Admission on 02/08/2023   Component Date Value Ref Range Status    TSH 02/10/2023 1.44  0.30 - 5.00 uIU/mL Final    WBC 02/10/2023 7.3  3.5 - 11.0 k/uL Final    RBC 02/10/2023 4.55  4.5 - 5.9 m/uL Final    Hemoglobin 02/10/2023 14.1  13.5 - 17.5 g/dL Final    Hematocrit 02/10/2023 43.0  41 - 53 % Final    MCV 02/10/2023 94.5  80 - 100 fL Final    MCH 02/10/2023 31.0  26 - 34 pg Final    MCHC 02/10/2023 32.8  31 - 37 g/dL Final    RDW 02/10/2023 14.1  11.5 - 14.9 % Final    Platelets 12/43/[id number] 322  150 - 450 k/uL Final    MPV 02/10/2023 7.3  6.0 - 12.0 fL Final         Reviewed patient's current plan of care and vital signs with nursing staff.     Labs reviewed: [x] Yes    Medications  Current Facility-Administered Medications: risperiDONE (RISPERDAL M-TABS) disintegrating tablet 1.5 mg, 1.5 mg, Oral, BID  sertraline (ZOLOFT) 20 MG/ML concentrated solution 100 mg, 100 mg, Oral, Daily  valproic acid (DEPAKENE) 250 MG/5ML oral solution 750 mg, 750 mg, Oral, BID  acetaminophen (TYLENOL) tablet 650 mg, 650 mg, Oral, Q6H PRN  ibuprofen (ADVIL;MOTRIN) tablet 400 mg, 400 mg, Oral, Q6H PRN  hydrOXYzine HCl (ATARAX) tablet 50 mg, 50 mg, Oral, TID PRN  traZODone (DESYREL) tablet 50 mg, 50 mg, Oral, Nightly PRN  polyethylene glycol (GLYCOLAX) packet 17 g, 17 g, Oral, Daily PRN  aluminum & magnesium hydroxide-simethicone (MAALOX) 200-200-20 MG/5ML suspension 30 mL, 30 mL, Oral, Q6H PRN  nicotine polacrilex (NICORETTE) gum 2 mg, 2 mg, Oral, Q2H PRN  haloperidol (HALDOL) tablet 5 mg, 5 mg, Oral, Q6H PRN **AND** LORazepam (ATIVAN) tablet 2 mg, 2 mg, Oral, Q6H PRN  haloperidol lactate (HALDOL) injection 5 mg, 5 mg, IntraMUSCular, Q6H PRN **AND** LORazepam (ATIVAN) injection 2 mg, 2 mg, IntraMUSCular, Q6H PRN **AND** diphenhydrAMINE (BENADRYL) injection 50 mg, 50 mg, IntraMUSCular, Q6H PRN    ASSESSMENT  Acute psychosis ([facility])         PLAN  Patient symptoms: Remain unstable  No Medication Changes Today  Monitor need and frequency of PRN medications.  Encourage participation in groups and milieu.  Attempt to develop insight.  Psycho-education conducted.  Probable discharge is TBT.  Follow-up daily while inpatient.        Patient continues to be monitored in the inpatient psychiatric facility at [facility] for safety and stabilization. Patient continues to need, on a daily basis, active treatment furnished directly by or requiring the supervision of inpatient psychiatric personnel.    Electronically signed by ROBERTO Dee CNP on 2/13/2023 at 9:11 PM    **This report has been created using voice recognition software. It may contain minor errors which are inherent in voice recognition technology.**

## 2023-02-14 NOTE — PLAN OF CARE
Problem: Anxiety  Goal: Will report anxiety at manageable levels  Description: INTERVENTIONS:  1. Administer medication as ordered  2. Teach and rehearse alternative coping skills  3. Provide emotional support with 1:1 interaction with staff  Patient denies anxiety but appears to be hyperalert      Problem: Depression/Self Harm  Goal: Effect of psychiatric condition will be minimized and patient will be protected from self harm  Description: INTERVENTIONS:  1. Assess impact of patient's symptoms on level of functioning, self care needs and offer support as indicated  2. Assess patient/family knowledge of depression, impact on illness and need for teaching  3. Provide emotional support, presence and reassurance  4. Assess for possible suicidal thoughts or ideation. If patient expresses suicidal thoughts or statements do not leave alone, initiate Suicide Precautions, move to a room close to the nursing station and obtain sitter  5. Initiate consults as appropriate with Mental Health Professional, Spiritual Care, Psychosocial CNS, and consider a recommendation to the LIP for a Psychiatric Consultation  Effects of psychiatric condition have yet to be minimized. Patient was protected against self-harm. Patient agrees to seek out staff if thoughts to self-injure arise. Q15 min rounding continued. Problem: Psychosis  Goal: Will report no hallucinations or delusions  Description: INTERVENTIONS:  1. Administer medication as  ordered  2. Assist with reality testing to support increasing orientation  3. Assess if patient's hallucinations or delusions are encouraging self harm or harm to others and intervene as appropriate  Patient denies hallucinations but appears to be responding to internal stimuli.  Patient remains delusional.

## 2023-02-14 NOTE — PROGRESS NOTES
Daily Progress Note  2/14/2023    Patient Name: Arvin Page    CHIEF COMPLAINT:  Acute psychosis          SUBJECTIVE:    Patient was seen for follow-up assessment today. When approached patient is calm and cooperative and engages during interview. As interview progressed in conversation shifted to previous paranoid thoughts patient's speech became pressured, tangential and presented with loose associations. Patient continues to discuss someone in the community trying to kill him and states that if he was MEDHAT he would die. Patient does report paranoia somewhat improved today and is less intense when thinking about these thoughts however it continues and he is unable to contract for safety outside the hospital.  Patient is agreeable to further titration of Risperdal.  Patient is not able to contract for safety outside the hospital this time. Pending stability with further titration, discharge is planned for 2 days with a goal of long-acting injection prior to discharge. Appetite:  [x] Adequate/Unchanged  [] Increased  [] Decreased      Sleep:       [x] Adequate/Unchanged  [] Fair  [] Poor      Group Attendance on Unit:   [] Yes   [] Selectively    [x] No    Compliant with scheduled medications: [x] Yes  [] No    Received emergency medications in past 24 hrs: [] Yes   [x] No    Medication Side Effects: Denies         Mental Status Exam  Level of consciousness: Awake and alert  Appearance:  Appropriate attire, resting in bed, fair grooming   Behavior/Motor: Approachable, engages with interviewer, restless, less preoccupied   Attitude toward examiner:  Cooperative, attentive, poor eye contact  Speech: Normal rate, volume, and tone.   Mood: \"Pretty good\"  Affect: Paranoid, reactive  Thought processes:  Goal directed, linear, tangential at times with loose associations noted  Thought content: Denies suicidal ideations               Denies homicidal ideations               Denies hallucinations Endorses delusions              Endorses improvement in paranoia  Cognition:  Oriented to self, disorganized to location, time, and situation  Concentration: Distractible  Memory: Fair  Insight & Judgment: Poor/improving    Data   height is 5' 11\" (1.803 m) and weight is 163 lb (73.9 kg). His oral temperature is 97.7 °F (36.5 °C). His blood pressure is 134/79 and his pulse is 119 (abnormal). His respiration is 14. Labs:   Admission on 02/08/2023   Component Date Value Ref Range Status    TSH 02/10/2023 1.44  0.30 - 5.00 uIU/mL Final    WBC 02/10/2023 7.3  3.5 - 11.0 k/uL Final    RBC 02/10/2023 4.55  4.5 - 5.9 m/uL Final    Hemoglobin 02/10/2023 14.1  13.5 - 17.5 g/dL Final    Hematocrit 02/10/2023 43.0  41 - 53 % Final    MCV 02/10/2023 94.5  80 - 100 fL Final    MCH 02/10/2023 31.0  26 - 34 pg Final    MCHC 02/10/2023 32.8  31 - 37 g/dL Final    RDW 02/10/2023 14.1  11.5 - 14.9 % Final    Platelets 48/[id number] 322  150 - 450 k/uL Final    MPV 02/10/2023 7.3  6.0 - 12.0 fL Final         Reviewed patient's current plan of care and vital signs with nursing staff.     Labs reviewed: [x] Yes    Medications  Current Facility-Administered Medications: risperiDONE (RISPERDAL M-TABS) disintegrating tablet 2 mg, 2 mg, Oral, BID  sertraline (ZOLOFT) 20 MG/ML concentrated solution 100 mg, 100 mg, Oral, Daily  valproic acid (DEPAKENE) 250 MG/5ML oral solution 750 mg, 750 mg, Oral, BID  acetaminophen (TYLENOL) tablet 650 mg, 650 mg, Oral, Q6H PRN  ibuprofen (ADVIL;MOTRIN) tablet 400 mg, 400 mg, Oral, Q6H PRN  hydrOXYzine HCl (ATARAX) tablet 50 mg, 50 mg, Oral, TID PRN  traZODone (DESYREL) tablet 50 mg, 50 mg, Oral, Nightly PRN  polyethylene glycol (GLYCOLAX) packet 17 g, 17 g, Oral, Daily PRN  aluminum & magnesium hydroxide-simethicone (MAALOX) 200-200-20 MG/5ML suspension 30 mL, 30 mL, Oral, Q6H PRN  nicotine polacrilex (NICORETTE) gum 2 mg, 2 mg, Oral, Q2H PRN  haloperidol (HALDOL) tablet 5 mg, 5 mg, Oral, Q6H PRN **AND** LORazepam (ATIVAN) tablet 2 mg, 2 mg, Oral, Q6H PRN  haloperidol lactate (HALDOL) injection 5 mg, 5 mg, IntraMUSCular, Q6H PRN **AND** LORazepam (ATIVAN) injection 2 mg, 2 mg, IntraMUSCular, Q6H PRN **AND** diphenhydrAMINE (BENADRYL) injection 50 mg, 50 mg, IntraMUSCular, Q6H PRN    ASSESSMENT  Acute psychosis ([facility] [address]         PLAN  Patient symptoms: Showing modest improvement  Medications Changed Today. A discussion of risks, benefits, and alternatives was held with the patient and this provider with regards to medication changes. After this discussion we mutually agreed to proceed with the medication changes. Modify order: Titrate Risperdal to 2 mg twice daily  Goal of Risperdal Perseris prior to discharge  Monitor need and frequency of PRN medications. Encourage participation in groups and milieu. Attempt to develop insight. Psycho-education conducted. Probable discharge is 2 days. Follow-up daily while inpatient. Patient continues to be monitored in the inpatient psychiatric facility at [facility] for safety and stabilization. Patient continues to need, on a daily basis, active treatment furnished directly by or requiring the supervision of inpatient psychiatric personnel. Electronically signed by ROBERTO Castellano CNP on 2/14/2023 at 4:33 PM    **This report has been created using voice recognition software. It may contain minor errors which are inherent in voice recognition technology. **

## 2023-02-14 NOTE — GROUP NOTE
Group Therapy Note    Date: 2/14/2023    Group Start Time: 2356  Group End Time: 9411  Group Topic: Recreational    [facility]    Veronica Morning, CTRS    Recreation Group Note        Date: February 14, 2023 Start Time: 215pm End Time:  245pm      Number of Participants in Group & Unit Census:  4/10    Topic: recreation therapy group     Goal of Group: pt will demonstrate improved social skills and improved decision making skills       Comments:     Patient did not participate in Recreation group, despite staff encouragement and explanation of benefits. Patient remain seclusive to self.   Q15 minute safety checks maintained for patient safety and will continue to encourage patient to attend unit programming.   p           Signature:  Yasmeen Olvera

## 2023-02-14 NOTE — GROUP NOTE
Group Therapy Note    Date: 2/14/2023    Group Start Time: 1100  Group End Time: 4379  Group Topic: Cognitive Skills    [facility] SANJANA Camarillo, CTRS    Cognitive Skills Group Note        Date: February 14, 2023 Start Time: 11am  End Time:  1135am      Number of Participants in Group & Unit Census:  2/10    Topic: cognitive skills     Goal of Group: pt demonstrate positive coping skills and improve concentration       Comments:     Patient did not participate in Cognitive Skills group, despite staff encouragement and explanation of benefits. Patient remain seclusive to self. Q15 minute safety checks maintained for patient safety and will continue to encourage patient to attend unit programming.             Signature:  Natacha Akins

## 2023-02-14 NOTE — PLAN OF CARE
Problem: Anxiety  Goal: Will report anxiety at manageable levels  Description: INTERVENTIONS:  1. Administer medication as ordered  2. Teach and rehearse alternative coping skills  3. Provide emotional support with 1:1 interaction with staff  Outcome: Progressing  Note: Patient denies suicidal ideation, anxiety, and depression. Patient was cooperative and accepted medications. Problem: Depression/Self Harm  Goal: Effect of psychiatric condition will be minimized and patient will be protected from self harm  Description: INTERVENTIONS:  1. Assess impact of patient's symptoms on level of functioning, self care needs and offer support as indicated  2. Assess patient/family knowledge of depression, impact on illness and need for teaching  3. Provide emotional support, presence and reassurance  4. Assess for possible suicidal thoughts or ideation. If patient expresses suicidal thoughts or statements do not leave alone, initiate Suicide Precautions, move to a room close to the nursing station and obtain sitter  5. Initiate consults as appropriate with Mental Health Professional, Spiritual Care, Psychosocial CNS, and consider a recommendation to the LIP for a Psychiatric Consultation  Outcome: Progressing  Note:   Patient denies suicidal ideation, anxiety, and depression. Patient was cooperative and accepted medications. Problem: Risk for Elopement  Goal: Patient will not exit the unit/facility without proper excort  Outcome: Progressing  Note: Patient denies suicidal ideation, anxiety, and depression. Patient was cooperative and accepted medications.

## 2023-02-15 PROCEDURE — 1240000000 HC EMOTIONAL WELLNESS R&B

## 2023-02-15 PROCEDURE — 6370000000 HC RX 637 (ALT 250 FOR IP)

## 2023-02-15 PROCEDURE — 6370000000 HC RX 637 (ALT 250 FOR IP): Performed by: PSYCHIATRY & NEUROLOGY

## 2023-02-15 RX ORDER — TRAZODONE HYDROCHLORIDE 50 MG/1
50 TABLET ORAL NIGHTLY PRN
Qty: 30 TABLET | Refills: 0 | Status: SHIPPED | OUTPATIENT
Start: 2023-02-15

## 2023-02-15 RX ORDER — SERTRALINE HYDROCHLORIDE 20 MG/ML
100 SOLUTION ORAL DAILY
Qty: 150 ML | Refills: 0 | Status: SHIPPED | OUTPATIENT
Start: 2023-02-16

## 2023-02-15 RX ORDER — VALPROIC ACID 250 MG/5ML
750 SOLUTION ORAL 2 TIMES DAILY
Qty: 900 ML | Refills: 0 | Status: SHIPPED | OUTPATIENT
Start: 2023-02-15

## 2023-02-15 RX ORDER — HYDROXYZINE 50 MG/1
50 TABLET, FILM COATED ORAL 3 TIMES DAILY PRN
Qty: 30 TABLET | Refills: 0 | Status: SHIPPED | OUTPATIENT
Start: 2023-02-15 | End: 2023-02-25

## 2023-02-15 RX ORDER — NICOTINE 21 MG/24HR
1 PATCH, TRANSDERMAL 24 HOURS TRANSDERMAL DAILY
Qty: 30 PATCH | Refills: 0 | Status: SHIPPED | OUTPATIENT
Start: 2023-02-16

## 2023-02-15 RX ORDER — NICOTINE 21 MG/24HR
1 PATCH, TRANSDERMAL 24 HOURS TRANSDERMAL DAILY
Status: DISCONTINUED | OUTPATIENT
Start: 2023-02-16 | End: 2023-02-16 | Stop reason: HOSPADM

## 2023-02-15 RX ORDER — M-VIT,TX,IRON,MINS/CALC/FOLIC 27MG-0.4MG
1 TABLET ORAL DAILY
Status: DISCONTINUED | OUTPATIENT
Start: 2023-02-16 | End: 2023-02-16 | Stop reason: HOSPADM

## 2023-02-15 RX ADMIN — VALPROIC ACID 750 MG: 250 SOLUTION ORAL at 08:52

## 2023-02-15 RX ADMIN — VALPROIC ACID 750 MG: 250 SOLUTION ORAL at 20:39

## 2023-02-15 RX ADMIN — TRAZODONE HYDROCHLORIDE 50 MG: 50 TABLET ORAL at 20:39

## 2023-02-15 RX ADMIN — NICOTINE POLACRILEX 2 MG: 2 GUM, CHEWING BUCCAL at 10:40

## 2023-02-15 RX ADMIN — NICOTINE POLACRILEX 2 MG: 2 GUM, CHEWING BUCCAL at 20:42

## 2023-02-15 RX ADMIN — NICOTINE POLACRILEX 2 MG: 2 GUM, CHEWING BUCCAL at 18:11

## 2023-02-15 RX ADMIN — RISPERIDONE 2 MG: 2 TABLET, ORALLY DISINTEGRATING ORAL at 20:39

## 2023-02-15 RX ADMIN — SERTRALINE HYDROCHLORIDE 100 MG: 20 SOLUTION, CONCENTRATE ORAL at 08:52

## 2023-02-15 RX ADMIN — IBUPROFEN 400 MG: 400 TABLET, FILM COATED ORAL at 14:11

## 2023-02-15 RX ADMIN — NICOTINE POLACRILEX 2 MG: 2 GUM, CHEWING BUCCAL at 13:01

## 2023-02-15 RX ADMIN — RISPERIDONE 2 MG: 2 TABLET, ORALLY DISINTEGRATING ORAL at 09:00

## 2023-02-15 ASSESSMENT — PAIN DESCRIPTION - ORIENTATION: ORIENTATION: LOWER

## 2023-02-15 ASSESSMENT — PAIN DESCRIPTION - LOCATION: LOCATION: BACK

## 2023-02-15 ASSESSMENT — PAIN SCALES - GENERAL: PAINLEVEL_OUTOF10: 6

## 2023-02-15 NOTE — PLAN OF CARE
Problem: Anxiety  Goal: Will report anxiety at manageable levels  Description: INTERVENTIONS:  1. Administer medication as ordered  2. Teach and rehearse alternative coping skills  3. Provide emotional support with 1:1 interaction with staff  2/14/2023 1944 by Nichelle Pradhan  Outcome: Progressing     Problem: Depression/Self Harm  Goal: Effect of psychiatric condition will be minimized and patient will be protected from self harm  Description: INTERVENTIONS:  1. Assess impact of patient's symptoms on level of functioning, self care needs and offer support as indicated  2. Assess patient/family knowledge of depression, impact on illness and need for teaching  3. Provide emotional support, presence and reassurance  4. Assess for possible suicidal thoughts or ideation. If patient expresses suicidal thoughts or statements do not leave alone, initiate Suicide Precautions, move to a room close to the nursing station and obtain sitter  5.  Initiate consults as appropriate with Mental Health Professional, Spiritual Care, Psychosocial CNS, and consider a recommendation to the LIP for a Psychiatric Consultation  2/14/2023 1944 by Nichelle Pradahn  Outcome: Progressing

## 2023-02-15 NOTE — PROGRESS NOTES
Behavioral Services                                              Medicare Re-Certification    I certify that the inpatient psychiatric hospital services furnished since the previous certification/re-certification were, and continue to be, medically necessary for;    [x] (1) Treatment which could reasonably be expected to improve the patient's condition,    [x] (2) Or for diagnostic study. Estimated length of stay/service 3-5 days    Plan for post-hospital care [facility]    This patient continues to need, on a daily basis, active treatment furnished directly by or requiring the supervision of inpatient psychiatric personnel.     Electronically signed by Lalo Clarke MD on 2/15/2023 at 11:09 AM

## 2023-02-15 NOTE — BH NOTE
Patient did not participate in 9am goals group despite staff encouragement and explanation of benefits. Q15 minute safety checks maintained for patient safety.

## 2023-02-15 NOTE — GROUP NOTE
Group Therapy Note    Date: 2/15/2023    Group Start Time: 1330  Group End Time: 1430  Group Topic: Relaxation    STISAK Srivastava, CTRS    Relaxation Group Note        Date: February 15, 2023 Start Time: 1:30pm  End Time: 2:30pm      Number of Participants in Group & Unit Census:  2/9    Topic: relaxation group     Goal of Group: pt will demonstrate improved relaxation skills using art       Comments:     Patient did not participate in Relaxation group, despite staff encouragement and explanation of benefits. Patient remain seclusive to self. Q15 minute safety checks maintained for patient safety and will continue to encourage patient to attend unit programming.               Signature:  Karl Case

## 2023-02-15 NOTE — GROUP NOTE
Group Therapy Note    Date: 2/15/2023    Group Start Time: 1000  Group End Time: 5471  Group Topic: Psychotherapy    LUCA Aguilar        Group Therapy Note    Attendees: 3/10       Patient was offered group therapy today but declined to participate despite encouragement from staff. 1:1 was offered.       Signature:  LUCA Campbell

## 2023-02-15 NOTE — GROUP NOTE
Group Therapy Note    Date: 2/15/2023    Group Start Time: 1100  Group End Time: 36  Group Topic: Cognitive Skills    [facility] SANJANA Mendez, CTRS    Cognitive Skills Group Note        Date: February 15, 2023 Start Time: 11am  End Time: 11:30am      Number of Participants in Group & Unit Census:  3/10    Topic: cognitive skills    Goal of Group: pt will demonstrate improved social skills and improved concentration      Comments:     Patient did not participate in Cognitive Skills group, despite staff encouragement and explanation of benefits. Patient remain seclusive to self. Q15 minute safety checks maintained for patient safety and will continue to encourage patient to attend unit programming.               Signature:  Jaspreet Carvalho

## 2023-02-15 NOTE — PLAN OF CARE
Problem: Anxiety  Goal: Will report anxiety at manageable levels  Description: INTERVENTIONS:  1. Administer medication as ordered  2. Teach and rehearse alternative coping skills  3. Provide emotional support with 1:1 interaction with staff  Outcome: Progressing  Flowsheets (Taken 2/15/2023 1459)  Will report anxiety at manageable levels:   Administer medication as ordered   Teach and rehearse alternative coping skills   Provide emotional support with 1:1 interaction with staff     Problem: Depression/Self Harm  Goal: Effect of psychiatric condition will be minimized and patient will be protected from self harm  Description: INTERVENTIONS:  1. Assess impact of patient's symptoms on level of functioning, self care needs and offer support as indicated  2. Assess patient/family knowledge of depression, impact on illness and need for teaching  3. Provide emotional support, presence and reassurance  4. Assess for possible suicidal thoughts or ideation. If patient expresses suicidal thoughts or statements do not leave alone, initiate Suicide Precautions, move to a room close to the nursing station and obtain sitter  5. Initiate consults as appropriate with Mental Health Professional, Spiritual Care, Psychosocial CNS, and consider a recommendation to the LIP for a Psychiatric Consultation  Outcome: Progressing  Flowsheets (Taken 2/15/2023 1459)  Effect of psychiatric condition will be minimized and patient will be protected from self harm:   Assess impact of patients symptoms on level of functioning, self care needs and offer support as indicated   Assess patient/family knowledge of depression, impact on illness and need for teaching   Provide emotional support, presence and reassurance     Problem: Psychosis  Goal: Will report no hallucinations or delusions  Description: INTERVENTIONS:  1. Administer medication as  ordered  2. Assist with reality testing to support increasing orientation  3.  Assess if patient's hallucinations or delusions are encouraging self harm or harm to others and intervene as appropriate  Outcome: Progressing  Note: Reports none at this time     Problem: Risk for Elopement  Goal: Patient will not exit the unit/facility without proper excort  Outcome: Progressing  Flowsheets (Taken 2/15/2023 3789)  Nursing Interventions for Elopement Risk:   Assist with personal care needs such as toileting, eating, dressing, as needed to reduce the risk of wandering   Collaborate with family members/caregivers to mitigate the elopement risk   Collaborate with treatment team for drug withdrawal symptoms treatment     Problem: Pain  Goal: Verbalizes/displays adequate comfort level or baseline comfort level  Outcome: Progressing  Flowsheets (Taken 2/15/2023 7959)  Verbalizes/displays adequate comfort level or baseline comfort level:   Encourage patient to monitor pain and request assistance   Assess pain using appropriate pain scale   Administer analgesics based on type and severity of pain and evaluate response

## 2023-02-16 VITALS
BODY MASS INDEX: 22.82 KG/M2 | HEART RATE: 97 BPM | TEMPERATURE: 98.3 F | OXYGEN SATURATION: 100 % | DIASTOLIC BLOOD PRESSURE: 58 MMHG | RESPIRATION RATE: 14 BRPM | HEIGHT: 71 IN | SYSTOLIC BLOOD PRESSURE: 100 MMHG | WEIGHT: 163 LBS

## 2023-02-16 PROCEDURE — 99239 HOSP IP/OBS DSCHRG MGMT >30: CPT | Performed by: NURSE PRACTITIONER

## 2023-02-16 PROCEDURE — 6370000000 HC RX 637 (ALT 250 FOR IP)

## 2023-02-16 RX ADMIN — VALPROIC ACID 750 MG: 250 SOLUTION ORAL at 08:20

## 2023-02-16 RX ADMIN — MULTIPLE VITAMINS W/ MINERALS TAB 1 TABLET: TAB at 08:20

## 2023-02-16 RX ADMIN — RISPERIDONE 2 MG: 2 TABLET, ORALLY DISINTEGRATING ORAL at 08:20

## 2023-02-16 RX ADMIN — SERTRALINE HYDROCHLORIDE 100 MG: 20 SOLUTION, CONCENTRATE ORAL at 08:21

## 2023-02-16 NOTE — BH NOTE
Patient given tobacco quitline number [phone] at this time. With nurse observation patient called number for information and follow up. Continue to reinforce the dangers of long term tobacco use and why tobacco cessation is important to patient.

## 2023-02-16 NOTE — DISCHARGE INSTR - OTHER ORDERS
Make sure to follow up with outpatient facility    Take all medications as directed    Avoid alcohol and drugs while taking medications

## 2023-02-16 NOTE — GROUP NOTE
Group Therapy Note    Date: 2/16/2023    Group Start Time: 1100  Group End Time: 36  Group Topic: Cognitive Skills    [facility] Fabio Gottron, CTRS    Cognitive Skills Group Note        Date: February 16, 2023 Start Time: 11am  End Time: 11:30am      Number of Participants in Group & Unit Census:  4/10    Topic: cognitive skills     Goal of Group: pt will demonstrate improved concentration and improved ability to follow directions       Comments:     Patient did not participate in Cognitive Skills group, despite staff encouragement and explanation of benefits. Patient remain seclusive to self. Q15 minute safety checks maintained for patient safety and will continue to encourage patient to attend unit programming.               Signature:  Claudell Bran

## 2023-02-16 NOTE — BH NOTE
[facility]  Discharge Note    Pt discharged with followings belongings:   Dental Appliances: None  Vision - Corrective Lenses: None  Hearing Aid: None  Jewelry: None  Body Piercings Removed: No  Clothing: Hat, Jacket/Coat, Pants, Shirt  Other Valuables: Keys   Valuables sent home with patient. Patient educated on aftercare instructions: Yes  Information faxed to [facility] by Staff  at 2:59 PM .Patient verbalize understanding of AVS:  Yes. Status EXAM upon discharge:  Mental Status and Behavioral Exam  Normal: No  Level of Assistance: Independent/Self  Facial Expression: Flat  Affect: Appropriate  Level of Consciousness: Alert  Frequency of Checks: 4 times per hour, close  Mood:Normal: No  Mood: Anxious, Depressed  Motor Activity:Normal: No  Motor Activity: Decreased  Eye Contact: Fair  Observed Behavior: Cooperative, Withdrawn, Preoccupied  Sexual Misconduct History: Current - no  Involved In Any Sexual Misconduct With Others? : No  History of Sexually Inappropriate Behavior When Previously Hospitalized?: No  Uncontrollable/Compulsive Masturbation?: No  Difficulty Controlling Sexual Impulses?: No  Preception: [place] to person, [place] to time, [place] to situation, [place] to place  Attention:Normal: No  Attention: Unable to concentrate  Thought Processes: Blocking  Thought Content:Normal: No  Thought Content: Preoccupations  Depression Symptoms: Isolative, Loss of interest, Impaired concentration  Anxiety Symptoms: Generalized  Deana Symptoms: Poor judgment  Hallucinations: None  Delusions: Yes  Delusions: Paranoid  Memory:Normal: No  Memory: Poor recent, Poor remote  Insight and Judgment: No  Insight and Judgment: Poor insight, Poor judgment    Tobacco Screening:  Practical Counseling, on admission, taryn X, if applicable and completed (first 3 are required if patient doesn't refuse):             ( x) Recognizing danger situations (included triggers and roadblocks)                    ( x) Coping skills (new ways to manage stress,relaxation techniques, changing routine, distraction)                                                           (x ) Basic information about quitting (benefits of quitting, techniques in how to quit, available resources  ( ) Referral for counseling faxed to [facility]                                                                                                                   ( ) Patient refused counseling  ( ) Patient refused referral  ( ) Patient refused prescription upon discharge  ( ) Patient has not smoked in the last 30 days    Metabolic Screening:    Lab Results   Component Value Date    LABA1C 4.9 08/30/2018       Lab Results   Component Value Date    CHOL 111 01/30/2020    CHOL 131 08/30/2018     Lab Results   Component Value Date    TRIG 190 (H) 01/30/2020    TRIG 140 08/30/2018     Lab Results   Component Value Date    HDL 31 (L) 01/30/2020    HDL 36 (L) 08/30/2018     No components found for: LDLCAL  No results found for: LABVLDL    Patient was discharged from unit at 1455. Patient ambulatory off unit. Patient was discharged to [address] [place] [address] by [address] [place] [address],[facility]. All belongings were verified and sent along with patient. Patient denied suicidal and homicidal ideations before discharge.      Tee Peña RN

## 2023-02-16 NOTE — PROGRESS NOTES
Daily Progress Note  2/15/2023    Patient Name: Abbie Mcgee    CHIEF COMPLAINT:  Acute psychosis          SUBJECTIVE:    Patient was seen for follow-up assessment today. When approached for interview patient states depression has improved and is denying suicidal or homicidal ideation. Patient reports significant improvement in paranoia and is able to have conversation regarding his water heater without distress. Patient reports it is possible there was not a problem and is challenging previous help delusional beliefs. Patient is agreeable to Risperdal Perseris charging discharge. Plan discussed with patient for discharge tomorrow pending continued stability overnight. Appetite:  [x] Adequate/Unchanged  [] Increased  [] Decreased      Sleep:       [x] Adequate/Unchanged  [] Fair  [] Poor      Group Attendance on Unit:   [] Yes   [] Selectively    [x] No    Compliant with scheduled medications: [x] Yes  [] No    Received emergency medications in past 24 hrs: [] Yes   [x] No    Medication Side Effects: Denies         Mental Status Exam  Level of consciousness: Awake and alert  Appearance:  Appropriate attire, resting in bed, fair grooming   Behavior/Motor: Approachable, engages with interviewer, restless, less preoccupied   Attitude toward examiner:  Cooperative, attentive, poor eye contact  Speech: Normal rate, volume, and tone. Mood: \"good\"  Affect: Congruent  Thought processes:  Goal directed, linear  Thought content: Denies suicidal ideations               Denies homicidal ideations               Denies hallucinations               Reports improvement in delusions              Reports improvement in paranoia  Cognition:  Oriented to self, disorganized to location, time, and situation  Concentration: Intact  Memory: Fair  Insight & Judgment: improving    Data   height is 5' 11\" (1.803 m) and weight is 163 lb (73.9 kg). His oral temperature is 97.9 °F (36.6 °C).  His blood pressure is 132/67 and his pulse is 95. His respiration is 14 and oxygen saturation is 100%. Labs:   Admission on 02/08/2023   Component Date Value Ref Range Status    TSH 02/10/2023 1.44  0.30 - 5.00 uIU/mL Final    WBC 02/10/2023 7.3  3.5 - 11.0 k/uL Final    RBC 02/10/2023 4.55  4.5 - 5.9 m/uL Final    Hemoglobin 02/10/2023 14.1  13.5 - 17.5 g/dL Final    Hematocrit 02/10/2023 43.0  41 - 53 % Final    MCV 02/10/2023 94.5  80 - 100 fL Final    MCH 02/10/2023 31.0  26 - 34 pg Final    MCHC 02/10/2023 32.8  31 - 37 g/dL Final    RDW 02/10/2023 14.1  11.5 - 14.9 % Final    Platelets 59/13/9055 322  150 - 450 k/uL Final    MPV 02/10/2023 7.3  6.0 - 12.0 fL Final         Reviewed patient's current plan of care and vital signs with nursing staff.     Labs reviewed: [x] Yes    Medications  Current Facility-Administered Medications: [START ON 2/16/2023] risperiDONE ER (PERSERIS) subcutaneous injection 120 mg, 120 mg, SubCUTAneous, Once  [START ON 2/16/2023] therapeutic multivitamin-minerals 1 tablet, 1 tablet, Oral, Daily  risperiDONE (RISPERDAL M-TABS) disintegrating tablet 2 mg, 2 mg, Oral, BID  sertraline (ZOLOFT) 20 MG/ML concentrated solution 100 mg, 100 mg, Oral, Daily  valproic acid (DEPAKENE) 250 MG/5ML oral solution 750 mg, 750 mg, Oral, BID  acetaminophen (TYLENOL) tablet 650 mg, 650 mg, Oral, Q6H PRN  ibuprofen (ADVIL;MOTRIN) tablet 400 mg, 400 mg, Oral, Q6H PRN  hydrOXYzine HCl (ATARAX) tablet 50 mg, 50 mg, Oral, TID PRN  traZODone (DESYREL) tablet 50 mg, 50 mg, Oral, Nightly PRN  polyethylene glycol (GLYCOLAX) packet 17 g, 17 g, Oral, Daily PRN  aluminum & magnesium hydroxide-simethicone (MAALOX) 200-200-20 MG/5ML suspension 30 mL, 30 mL, Oral, Q6H PRN  nicotine polacrilex (NICORETTE) gum 2 mg, 2 mg, Oral, Q2H PRN  haloperidol (HALDOL) tablet 5 mg, 5 mg, Oral, Q6H PRN **AND** LORazepam (ATIVAN) tablet 2 mg, 2 mg, Oral, Q6H PRN  haloperidol lactate (HALDOL) injection 5 mg, 5 mg, IntraMUSCular, Q6H PRN **AND** LORazepam (ATIVAN) injection 2 mg, 2 mg, IntraMUSCular, Q6H PRN **AND** diphenhydrAMINE (BENADRYL) injection 50 mg, 50 mg, IntraMUSCular, Q6H PRN    ASSESSMENT  Acute psychosis ([facility]         PLAN  Patient symptoms: Showing improvement  Medications Changed Today. A discussion of risks, benefits, and alternatives was held with the patient and this provider with regards to medication changes. After this discussion we mutually agreed to proceed with the medication changes. New order: Risperdal Perseris 120 mg  Monitor need and frequency of PRN medications. Encourage participation in groups and milieu. Attempt to develop insight. Psycho-education conducted. Probable discharge is tomorrow. Follow-up daily while inpatient. Patient continues to be monitored in the inpatient psychiatric facility at [facility] for safety and stabilization. Patient continues to need, on a daily basis, active treatment furnished directly by or requiring the supervision of inpatient psychiatric personnel. Electronically signed by ROBERTO Pearce CNP on 2/15/2023 at 9:36 PM    **This report has been created using voice recognition software. It may contain minor errors which are inherent in voice recognition technology. **

## 2023-02-16 NOTE — PLAN OF CARE
[facility] Interdisciplinary Treatment Plan Note     Review Date & Time: 2/16/2023   0916    Admission Type:   Admission Type: Involuntary    Reason for admission:  Reason for Admission: Patient has delusional thinking, thinks there's a bomb in the water heater. Patiet has called polcie several times, linked with [employer], and has guardian. Patient currently lives in a group home and has locked patients out of the group home several times. Patients denies suicidal/homicidal ideation.     Estimated Length of Stay:  8-14 days  Estimated Discharge Date Update:   to be determined by physician    PATIENT STRENGTHS:  Patient Strengths:   Patient Strengths and Limitations:Limitations: Unrealistic self-view, External locus of control, Difficulty problem solving/relies on others to help solve problems  Addictive Behavior:Addictive Behavior  In the Past 3 Months, Have You Felt or Has Someone Told You That You Have a Problem With  : None  Medical Problems:   Past Medical History:   Diagnosis Date    Hypertension     Schizophrenia, schizo-affective ([facility]        Risk:  Fall Risk   Roque Scale Roque Scale Score: 22  BVC      Change in scores:  No. Changes to plan of Care:  No    Status EXAM:   Mental Status and Behavioral Exam  Normal: No  Level of Assistance: Independent/Self  Facial Expression: Flat  Affect: Appropriate  Level of Consciousness: Alert  Frequency of Checks: 4 times per hour, close  Mood:Normal: No  Mood: Depressed, Anxious  Motor Activity:Normal: No  Motor Activity: Decreased  Eye Contact: Fair  Observed Behavior: Cooperative, Guarded, Withdrawn  Sexual Misconduct History: Current - no  Involved In Any Sexual Misconduct With Others? : No  History of Sexually Inappropriate Behavior When Previously Hospitalized?: No  Uncontrollable/Compulsive Masturbation?: No  Difficulty Controlling Sexual Impulses?: No  Preception: [place] to person, [place] to time, [place] to place, [place] to situation  Attention:Normal: No  Attention: Unable to concentrate  Thought Processes: Blocking  Thought Content:Normal: No  Thought Content: Paranoia  Depression Symptoms: Isolative, Impaired concentration  Anxiety Symptoms: Generalized  Deana Symptoms: Poor judgment  Hallucinations: None  Delusions: Yes  Delusions: Paranoid  Memory:Normal: No  Memory: Poor recent, Poor remote  Insight and Judgment: No  Insight and Judgment: Poor judgment, Poor insight    Daily Assessment Last Entry:   Daily Sleep (WDL): Within Defined Limits            Daily Nutrition (WDL): Within Defined Limits  Level of Assistance: Independent/Self    Patient Monitoring:  Frequency of Checks: 4 times per hour, close    Psychiatric Symptoms:   Depression Symptoms  Depression Symptoms: Isolative, Impaired concentration  Anxiety Symptoms  Anxiety Symptoms: Generalized  Deana Symptoms  Deana Symptoms: Poor judgment          Suicide Risk CSSR-S:  1) Within the past month, have you wished you were dead or wished you could go to sleep and not wake up? : No  2) Have you actually had any thoughts of killing yourself? : No  6) Have you ever done anything, started to do anything, or prepared to do anything to end your life?: No  Change in result:  No  Change in plan of care:  No    EDUCATION:   Learner Progress Toward Treatment Goals:   Reviewed results and recommendations of this team, reviewed group plan and strategies, reviewed signs, symptoms and risk of self harm and violent behavior, reviewed goals and plan of care. Method:  individual education, small group, verbal education. Outcome:    Pt verbalized understanding, but needs reinforcement to obtain goals.     PATIENT GOALS:  Short term:  Stabilization in environment, avoid environmental triggers, use coping skills  Long term:  Stabilization in environment at home    PLAN/TREATMENT RECOMMENDATIONS UPDATE:   Continue with group therapies, education of coping skills   Continue to monitor patient on unit. Medications provided/medication compliance by patient. Continue for plans to obtain long term goals after discharge.     SHORT-TERM GOALS UPDATE:  Time frame for Short-Term Goals:  8-14days     LONG-TERM GOALS UPDATE:  Time frame for Long-Term Goals:  6 months    Members Present in Team Meeting:   See Signatures Sheet   Tee Peña RN

## 2023-02-16 NOTE — DISCHARGE SUMMARY
Provider Discharge Summary     Patient ID:  [facility]  [id number]  73 y.o.  1990    Admit date: 2/8/2023    Discharge date and time: 2/16/2023  2:48 PM     Admitting Physician: Anne Tirado MD     Discharge Physician: ROBERTO Wynn CNP    Admission Diagnoses: Acute psychosis [facility]) [F23]    Discharge Diagnoses:      Acute psychosis [facility])     Patient Active Problem List   Diagnosis Code    Schizophrenia ([facility] F20.9    Cannabis dependence ([facility] F12.20    Chest pain R07.9    Acute psychosis ([facility] [address]        Admission Condition: poor    Discharged Condition: stable    Indication for Admission: threat to self    History of Present Illnes (present tense wording is of findings from admission exam and are not necessarily indicative of current findings):   [facility] is a [address] y.o. male who has a past medical history of pretension and schizophrenia. Patient presented to the [facility] ED via emergency admission with acute psychosis after reported there was a bomb in his water heater. Per emergency admission, \"patient here after calling 911 for potential bomb threat. Pt extreme disorganized thoughts and speech. Persecutory delusions. Medically cleared. \"     Per ED documentation, \"Patient is a 56 [address] old male that presents to the ED with acute psychosis reporting that there is a bomb in his water heater. Patient has bizarre and delusional thoughts, his speech is pressured and tangential, affect is flat. Patient refers to himself as the \"zodiac boy,\" and is preoccupied with  discussion. He is able to be redirected verbally but is not able to answer questions appropriately. Throughout the conversation, patient does report speaking with \"dispatch\" along with President Melissa, and the NSA.   Writer asked to see patients call log on his phone and it does show a 911 call placed this morning, when asked patient stated there was a bomb in the water heater and people were trying to kill him.  He reports \"they sent SWAT. \"  Writer did observe SWAT driving down [employer] at the time patient was arriving to the ED today. Writer contacted [employer] and they confirmed patient has made several calls that are \"ramblings,\" and that police were dispatched to the group home today. Writer contacted patient  at [facility], who confirmed the group has been calling [employer] concerned about patient. Kash Chapman reports patient has not been taking his medications and has been locking residents out of the group home. Kash Chapman is in support of an admission and will be communicating with patients providers, including psychiatrist Dr. Denise Costello. Patient has a legal guardian, a message was left for guardian asking for a return call. Medical records indicate the last psychiatric admission was in 2018. \"     Patient is agreeable to intake assessment on room where he is standing and somewhat pacing. He is bizarre, delusional, pressured, per verbal and tangential speech with flat affect. Patient unable to answer questions appropriately and requires frequent redirection during assessment. He voices being here due to \"gas leak at home and zodiac killer's\". Patient is noted to be restless and sweating and states \"I have ethanol poisoning my blood that is why I am sweating\". Nursing staff does verify the patient was observed working out and on room. Patient believes that he is the \"[employer] the Faith\" and \"zodiac boy\". Patient continues to be bizarre and delusional throughout assessment and voices that he was born on life support and reports to have \"marks to right lower abdomen and teeth to prove it\". Poor dentition noted however no marks/scars noted to patient's abdomen upon assessment. Also he believes that he \"killed [employer] or Izell Beat" who he reports is \"Alli Kincaid's son\". Patient voices to have had poor sleep and full of energy, on and off for \"16 hours at a time\".   He denies any thoughts of self-harm or thoughts of harming others. Patient denies any perceptual disturbances however appears preoccupied and to be responding to internal stimulation. Per EMR records patient has experienced visual hallucinations in the past.  He endorses paranoia that workers at the group home are out to get him. Writer was unable to complete intake assessment due to patient's current psychotic state. Patient unwilling to disclose information to any family members/friends to help with collateral information. Plan to further assess patient went stable from current acute psychosis. Patient denies any issues with violence, aggressiveness and forensic history. Per guardian patient is a registered sex offender in [place] who has not been following up with charges. He denies any current legal issues however per prior documentation patient has made multiple marijuana calls. Patient denies any prior or current issues with alcohol abuse. He endorses being in nicotine user, smoking 1 pack daily since age 11. Patient reports occasional cannabis use. He reports history of \"recreational, opioids and Xanax\" use, unable to disclose last use. Alcohol level negative and urine toxin positive for cannabis on admission. PDMP reviewed, no activity noted. Writer called camila Allen at [phone] to gather collateral information. Current reports of known patient for 10 to 15 years and has struggled for paranoid delusions with history of noncompliance. He reports the patient has prior hospitalization at [facility] and violence psychiatric unit in [place]. Last known inpatient hospitalization was about 1-1/2 to 2 years ago per guardian. Guardian reports that patient plans to work for the [employer] however has been unemployed and has lived in a group home. He reports the patient has history of being a sex offender and will be possibly picked up by [employer] due to not following up with the charges.   Guardian has been aware of patient's current state and treatment plan. Current treatment plan reviewed and agreeable to start including medication per guardian: valproic acid 750 mg solution p.o. twice daily, Zoloft 100 mg solution p.o. daily and add Risperdal 0.5 mg disintegrating p.o. twice daily with goal to transition to [address] [place] [address]. Guardian agreeable to transition to LOMBARDI due to patient's history of noncompliance. Hospital Course:   Upon admission, Abby Polanco was provided a safe secure environment, introduced to unit milieu. Patient participated in groups and individual therapies. Meds were adjusted as noted below. After few days of hospital care, patient began to feel improvement. Depression lifted, thoughts to harm self ceased. Sleep improved, appetite was good. On morning rounds 2/16/2023, Abby Polanco endorses feeling ready for discharge. Patient denies suicidal or homicidal ideations, denies hallucinations or delusions. Denies SE's from meds. It was decided that maximum benefit from hospital care had been achieved and patient can be discharged. Consults:   Internal medicine for medical management    Significant Diagnostic Studies: Routine labs and diagnostics    Treatments: Psychotropic medications, therapy with group, milieu, and 1:1 with nurses, social workers, PAJuniorC/CNP, and Attending physician.       Discharge Medications:  Current Discharge Medication List        START taking these medications    Details   hydrOXYzine HCl (ATARAX) 50 MG tablet Take 1 tablet by mouth 3 times daily as needed for Anxiety  Qty: 30 tablet, Refills: 0      traZODone (DESYREL) 50 MG tablet Take 1 tablet by mouth nightly as needed for Sleep  Qty: 30 tablet, Refills: 0      risperiDONE ER (PERSERIS) 120 MG PRSY subcutaneous injection Inject 0.8 mLs into the skin every 30 days  Qty: 1 each, Refills: 0      nicotine (NICODERM CQ) 21 MG/24HR Place 1 patch onto the skin daily  Qty: 30 patch, Refills: 0           CONTINUE these medications which have CHANGED    Details   valproic acid (DEPAKENE) 250 MG/5ML SOLN oral solution Take 15 mLs by mouth 2 times daily  Qty: 900 mL, Refills: 0      sertraline (ZOLOFT) 20 MG/ML concentrated solution Take 5 mLs by mouth daily  Qty: 150 mL, Refills: 0           STOP taking these medications       benztropine (COGENTIN) 1 MG tablet Comments:   Reason for Stopping:         OLANZapine (ZYPREXA) 20 MG tablet Comments:   Reason for Stopping:         busPIRone (BUSPAR) 15 MG tablet Comments:   Reason for Stopping:         ARIPiprazole lauroxil (ARISTADA) 441 MG/1.6ML PRSY injection Comments:   Reason for Stopping:         loratadine (ALAVERT) 10 MG tablet Comments:   Reason for Stopping:         propranolol (INDERAL) 10 MG tablet Comments:   Reason for Stopping:         Multiple Vitamins-Minerals (THERAPEUTIC MULTIVITAMIN-MINERALS) tablet Comments:   Reason for Stopping:                Core Measures statement:   Not applicable    Discharge Exam:  Level of consciousness:  Within normal limits  Appearance: Street clothes, seated, with good grooming  Behavior/Motor: No abnormalities noted  Attitude toward examiner:  Cooperative, attentive, good eye contact  Speech:  spontaneous, normal rate, normal volume and well articulated  Mood:  euthymic  Affect:  Full range  Thought processes:  linear, goal directed and coherent  Thought content:  denies homicidal ideation  Suicidal Ideation:  denies suicidal ideation  Delusions:  no evidence of delusions  Perceptual Disturbance:  denies any perceptual disturbance  Cognition:  Intact  Memory: age appropriate  Insight & Judgement: fair  Medication side effects: denies     Disposition: home    Patient Instructions: Activity: activity as tolerated  1. Patient instructed to take medications regularly and follow up with outpatient appointments.      [facility]       Signed:    Electronically signed by ROBERTO Kendall CNP on 2/16/23 at 2:48 PM EST    Time Spent on discharge is more than 30 minutes in the examination, evaluation, counseling and review of medications and discharge plan. An electronic signature was used to authenticate this note. **This report has been created using voice recognition software. It may contain minor errors which are inherent in voice recognition technology. **

## 2023-02-16 NOTE — PROGRESS NOTES
CLINICAL PHARMACY NOTE: MEDS TO BEDS    Total # of Prescriptions Filled: 3   The following medications were delivered to the patient:  Hydroxyzine HCL 50mg  Trazodone HCL 50mg  Nicotine Step 1 21mg/24hr Patch     Additional Documentation:  Delivered Medication to [address] [place] [address] + Transfer to [facility] [address] per guardian)   - Valproic Acid   - Zoloft Liquid Patient is calling stating she has been on paxil for one month now and is having a lot of side effects she states she feels dizzy, nauseated, loss of appetite and sweating.  She wanted to know if she should try something else if so she uses the [employer] [facility] please advise

## 2023-02-16 NOTE — PLAN OF CARE
Problem: Anxiety  Goal: Will report anxiety at manageable levels  Description: INTERVENTIONS:  1. Administer medication as ordered  2. Teach and rehearse alternative coping skills  3. Provide emotional support with 1:1 interaction with staff  2/16/2023 0244 by Lisa Llamas LPN  Outcome: Progressing     Problem: Depression/Self Harm  Goal: Effect of psychiatric condition will be minimized and patient will be protected from self harm  Description: INTERVENTIONS:  1. Assess impact of patient's symptoms on level of functioning, self care needs and offer support as indicated  2. Assess patient/family knowledge of depression, impact on illness and need for teaching  3. Provide emotional support, presence and reassurance  4. Assess for possible suicidal thoughts or ideation. If patient expresses suicidal thoughts or statements do not leave alone, initiate Suicide Precautions, move to a room close to the nursing station and obtain sitter  5. Initiate consults as appropriate with Mental Health Professional, Spiritual Care, Psychosocial CNS, and consider a recommendation to the [facility] for a Psychiatric Consultation  2/16/2023 0244 by Lisa Llamas LPN  Outcome: Progressing     Problem: Psychosis  Goal: Will report no hallucinations or delusions  Description: INTERVENTIONS:  1. Administer medication as  ordered  2. Assist with reality testing to support increasing orientation  3. Assess if patient's hallucinations or delusions are encouraging self harm or harm to others and intervene as appropriate  2/16/2023 0244 by Lisa Llamas LPN  Outcome: Progressing     Problem: Risk for Elopement  Goal: Patient will not exit the unit/facility without proper excort  2/16/2023 0244 by Lisa Llamas LPN  Outcome: Progressing

## 2023-02-16 NOTE — DISCHARGE INSTRUCTIONS
Information:  Medications:   Medication summary provided   I understand that I should take only the medications on my list.     -why and when I need to take each medicine.     -which side effects to watch for.     -that I should carry my medication information at all times in case of     Emergency situations. I will take all of my medicines to follow up appointments.     -check with my physician or pharmacist before taking any new    Medication, over the counter product or drink alcohol.    -Ask about food, drug or dietary supplement interactions.    -discard old lists and update records with medication providers. Notify Physician:  Notify physician if you notice:   Always call 911 if you feel your life is in danger  In case of an emergency call 911 immediately! If 911 is not available call your local emergency medical system for help    Behavioral Health Follow Up:  Original Referral Source:[facility]  Discharge Diagnosis: Acute psychosis ([facility] [address] [F23]  Recommendations for Level of Care: Follow up with outpatient facility, Take medications as instructed  Patient status at discharge: Denies suicidal and homicidal ideations, Verbalizes readiness for discharge  [facility] hospital  was[facility]  Aftercare plan faxed: Yes   -faxed by: Staff   -date: 2/16/23   -time: 1400  Prescriptions: Sent from [address] [facility] [facility]     Smoking: Quit Smoking. Call the [employer]'s smoking quitline at 4-747-76Z-QUIT  Know the signs of a heart attack   If you have any of the following symptoms call 911 immediately, do not wait more    Than five minutes. 1. Pressure, fullness and/ or squeezing in the center of the chest spreading to    The jaw, neck or shoulder. 2. Chest discomfort with light headedness, fainting, sweating, nausea or    Shortness of breath. 3. Upper abdominal pressure or discomfort. 4. Lower chest pain, back pain, unusual fatigue, shortness of breath, nausea   Or dizziness.      General Information:   Questions regarding your bill: Call HELP program [phone]     Suicide Hotline (Issa Albert)  ([phone] [phone]      Recovery Help line- 799.525.3194      To obtain results of pending studies call Medical Records at: [phone]     For emergencies and 24 hour/7 days a week contact information:  [phone]        Learning About COVID-19 and Flu Symptoms  How can you tell COVID-19 from the flu? COVID-19 and the flu have similar symptoms. The two can be hard to tell apart. The only way to know for sure which illness you have is to be tested. If you have questions about COVID-19 testing, ask your doctor or go to cdc.gov to use the COVID-19 Viral Testing Tool. Since the symptoms are so alike, it makes sense to act as if you have COVID-19 until your test results come back. This means staying home and limiting contact with people in your home. You'll need to wash your hands often and disinfect surfaces that you touch. And be sure to wear a mask when you're around other people. This is also good advice if you think you have the flu. COVID-19 and the flu have these symptoms in common:  Fever or chills  Cough  Shortness of breath  Fatigue (tiredness)  Sore throat  Runny or stuffy nose  Muscle and body aches  Headache  Vomiting and diarrhea (more common in children than adults)  COVID-19 has another symptom that also may occur:  New loss of taste or smell  COVID-19 symptoms may appear from 2 to 14 days after infection. Flu symptoms usually appear 1 to 4 days after infection. Why should you get the flu and COVID-19 vaccines? It's important to get your yearly flu vaccine and stay up to date on your COVID-19 vaccines. The flu and COVID-19 can be active at the same time. You can get sick with both infections at once. And having both may make you more sick than getting just one. Getting both vaccines can prevent you and others from getting very sick.  If you do get the flu or COVID-19 after being vaccinated, you're much less likely to get seriously ill. Where can you learn more? Go to http://www.woods.com/ and enter C123 to learn more about \"Learning About COVID-19 and Flu Symptoms. \"  Current as of: October 28, 2022               Content Version: 13.5  © 4255-7453 [employer]. Care instructions adapted under license by [facility] ([facility]). If you have questions about a medical condition or this instruction, always ask your healthcare professional. [address] any warranty or liability for your use of this information. hydroxyzine  Pronunciation:  mitch DROX ee zeen  Brand:  Vistaril  What is the most important information I should know about hydroxyzine? You should not use hydroxyzine if you are pregnant, especially during the first or second trimester. Hydroxyzine can cause a serious heart problem, especially if you use certain medicines at the same time. Tell your doctor about all your current medicines and any you start or stop using. What is hydroxyzine? Hydroxyzine reduces activity in the central nervous system. It also acts as an antihistamine that reduces the effects of natural chemical histamine in the body. Histamine can produce symptoms of itching, or hives on the skin. Hydroxyzine is used as a sedative to treat anxiety and tension. It is also used together with other medications given during and after general anesthesia. Hydroxyzine is also used to treat allergic skin reactions such as hives or contact dermatitis. Hydroxyzine may also be used for purposes not listed in this medication guide. What should I discuss with my healthcare provider before taking hydroxyzine? You should not use hydroxyzine if you are allergic to it, or if:  you have long QT syndrome;  you are allergic to cetirizine (Zyrtec) or levocetirizine (Xyzal); or  you are in the first trimester of pregnancy.   You should not use hydroxyzine if you are pregnant, especially during the first or second trimester. Hydroxyzine could harm the unborn baby or cause birth defects. Use effective birth control to prevent pregnancy while you are using this medicine. To make sure hydroxyzine is safe for you, tell your doctor if you have:  blockage in your digestive tract (stomach or intestines);  bladder obstruction or other urination problems;  glaucoma;  heart disease, slow heartbeats;  personal or family history of long QT syndrome;  an electrolyte imbalance (such as high or low levels of potassium in your blood);  if you have recently had a heart attack. It is not known whether hydroxyzine passes into breast milk or if it could harm a nursing baby. You should not breast-feed while using this medicine. Do not give this medicine to a child without medical advice. How should I take hydroxyzine? Follow all directions on your prescription label. Your doctor may occasionally change your dose. Do not use this medicine in larger or smaller amounts or for longer than recommended. Shake the oral suspension (liquid) well just before you measure a dose. Measure liquid medicine with the dosing syringe provided, or with a special dose-measuring spoon or medicine cup. If you do not have a dose-measuring device, ask your pharmacist for one. Hydroxyzine is for short-term use only. You should not take this medicine for longer than 4 months. Call your doctor if your anxiety symptoms do not improve, or if they get worse. Store at room temperature away from moisture and heat. What happens if I miss a dose? Take the missed dose as soon as you remember. Skip the missed dose if it is almost time for your next scheduled dose. Do not take extra medicine to make up the missed dose. What happens if I overdose? Seek emergency medical attention or call the Poison Help line at 1-685.203.1168.   Overdose symptoms may include severe drowsiness, nausea, vomiting, uncontrolled muscle movements, or seizure (convulsions). What should I avoid while taking hydroxyzine? This medicine may impair your thinking or reactions. Be careful if you drive or do anything that requires you to be alert. Drinking alcohol with this medicine can cause side effects. What are the possible side effects of hydroxyzine? Get emergency medical help if you have signs of an allergic reaction:  hives; difficult breathing; swelling of your face, lips, tongue, or throat. In rare cases, hydroxyzine may cause a severe skin reaction. Stop taking this medicine and call your doctor right away if you have sudden skin redness or a rash that spreads and causes white or yellow pustules, blistering, or peeling. Stop using hydroxyzine and call your doctor at once if you have:  fast or pounding heartbeats;  headache with chest pain;  severe dizziness, fainting; or  a seizure (convulsions). Side effects such as drowsiness and confusion may be more likely in older adults. Common side effects may include:  drowsiness;  headache;  dry mouth; or  skin rash. This is not a complete list of side effects and others may occur. Call your doctor for medical advice about side effects. You may report side effects to FDA at 2-601-FDA-9864. What other drugs will affect hydroxyzine? Taking this medicine with other drugs that make you sleepy can worsen this effect. Ask your doctor before taking hydroxyzine with a sleeping pill, narcotic pain medicine, muscle relaxer, or medicine for anxiety, depression, or seizures. Hydroxyzine can cause a serious heart problem, especially if you use certain medicines at the same time, including antibiotics, antidepressants, heart rhythm medicine, antipsychotic medicines, and medicines to treat cancer, malaria, HIV or AIDS. Tell your doctor about all medicines you use, and those you start or stop using during your treatment with hydroxyzine.   Other drugs may interact with hydroxyzine, including prescription and over-the-counter medicines, vitamins, and herbal products. Not all possible interactions are listed here. Tell each of your health care providers about all medicines you use now and any medicine you start or stop using. Where can I get more information? Your pharmacist can provide more information about hydroxyzine. Remember, keep this and all other medicines out of the reach of children, never share your medicines with others, and use this medication only for the indication prescribed. Every effort has been made to ensure that the information provided by [address] is accurate, up-to-date, and complete, but no guarantee is made to that effect. Drug information contained herein may be time sensitive. [facility] information has been compiled for use by healthcare practitioners and consumers in the [place] and therefore [facility] does not warrant that uses outside of the [place] are appropriate, unless specifically indicated otherwise. [facility]'s drug information does not endorse drugs, diagnose patients or recommend therapy. [facility] information is an informational resource designed to assist licensed healthcare practitioners in caring for their patients and/or to serve consumers viewing this service as a supplement to, and not a substitute for, the expertise, skill, knowledge and judgment of healthcare practitioners. The absence of a warning for a given drug or drug combination in no way should be construed to indicate that the drug or drug combination is safe, effective or appropriate for any given patient. [facility] does not assume any responsibility for any aspect of healthcare administered with the aid of information [facility] provides. The information contained herein is not intended to cover all possible uses, directions, precautions, warnings, drug interactions, allergic reactions, or adverse effects.  If you have questions about the drugs you are taking, check with your doctor, nurse or pharmacist.  Copyright 5634-3438 [employer]. Version: 8.01. Revision date: 3/15/2017. Care instructions adapted under license by [facility] ([facility]). If you have questions about a medical condition or this instruction, always ask your healthcare professional. [address] any warranty or liability for your use of this information. trazodone  Pronunciation:  Baby Samara oh done  Brand:  Isabel  What is the most important information I should know about trazodone? Some young people have thoughts about suicide when first taking an antidepressant. Stay alert to changes in your mood or symptoms. Report any new or worsening symptoms to your doctor. Trazodone is not approved for use by anyone younger than 25years old. What is trazodone? Trazodone is an antidepressant that is used to treat major depressive disorder. Trazodone may also be used for purposes not listed in this medication guide. What should I discuss with my healthcare provider before taking trazodone? You should not use trazodone if you are allergic to it. Do not use trazodone if you have used an MAO inhibitor in the past 14 days. A dangerous drug interaction could occur. MAO inhibitors include isocarboxazid, linezolid, methylene blue injection, phenelzine, tranylcypromine, and others. After you stop taking trazodone, you must wait at least 14 days before you start taking an MAOI. Tell your doctor if you also take stimulant medicine, opioid medicine, herbal products, or medicine for depression, mental illness, Parkinson's disease, migraine headaches, serious infections, or prevention of nausea and vomiting. An interaction with trazodone could cause a serious condition called serotonin syndrome.   Tell your doctor if you have ever had:  liver or kidney disease;  heart disease, or a recent heart attack;  a bleeding or blood clotting disorder;  seizures or epilepsy;  narrow-angle glaucoma;  long QT syndrome;  drug addiction or suicidal thoughts; or  bipolar disorder (manic depression). Some young people have thoughts about suicide when first taking an antidepressant. Your doctor should check your progress at regular visits. Your family or other caregivers should also be alert to changes in your mood or symptoms. Taking this medicine during pregnancy could harm the baby, but stopping the medicine may not be safe for you. Do not start or stop trazodone without asking your doctor. If you are pregnant, your name may be listed on a pregnancy registry to track the effects of trazodone on the baby. Ask a doctor if it is safe to breastfeed while using this medicine. Trazodone is not approved for use by anyone younger than [address] [facility] 7years old. How should I take trazodone? Follow all directions on your prescription label and read all medication guides or instruction sheets. Your doctor may occasionally change your dose. Use the medicine exactly as directed. Take trazodone after a meal or a snack. Your symptoms may not improve for up to 2 weeks. Do not stop using trazodone suddenly, or you could have unpleasant symptoms (such as agitation, confusion, tingling or electric shock feelings). Ask your doctor before stopping the medicine. Store at room temperature away from moisture, heat, and light. What happens if I miss a dose? Take the medicine as soon as you can, but skip the missed dose if it is almost time for your next dose. Do not take two doses at one time. What happens if I overdose? Seek emergency medical attention or call the Poison Help line at 1-265.657.6874. An overdose can be fatal when trazodone is taken with alcohol, barbiturates such as phenobarbital, or sedatives such as diazepam (Valium). Overdose symptoms may include extreme drowsiness, vomiting, penis erection that is painful or prolonged, fast or pounding heartbeat, seizure (black-out or convulsions), or breathing that slows or stops.   What should I avoid while taking trazodone? Do not drink alcohol. Dangerous side effects or death could occur. Ask your doctor before taking a nonsteroidal anti-inflammatory drug (NSAID) such as aspirin, ibuprofen, naproxen, Advil, Aleve, Motrin, and others. Using an NSAID with trazodone may cause you to bruise or bleed easily. Avoid driving or hazardous activity until you know how this medicine will affect you. Your reactions could be impaired. Avoid getting up too fast from a sitting or lying position, or you may feel dizzy. What are the possible side effects of trazodone? Get emergency medical help if you have signs of an allergic reaction:  hives; difficulty breathing; swelling of your face, lips, tongue, or throat. Stop taking trazodone and call your doctor at once if you have a penis erection that is painful or lasts 6 hours or longer. This is a medical emergency and could lead to a serious condition that must be corrected with surgery. Report any new or worsening symptoms to your doctor, such as: mood or behavior changes, anxiety, panic attacks, trouble sleeping, or if you feel impulsive, irritable, agitated, hostile, aggressive, restless, hyperactive (mentally or physically), more depressed, or have thoughts about suicide or hurting yourself. Call your doctor at once if you have:  fast or pounding heartbeats, fluttering in your chest, shortness of breath, and sudden dizziness (like you might pass out);  slow heartbeats;  unusual thoughts or behavior;  easy bruising, unusual bleeding; or  low levels of sodium in the body --headache, confusion, slurred speech, severe weakness, vomiting, loss of coordination, feeling unsteady. Seek medical attention right away if you have symptoms of serotonin syndrome, such as: agitation, hallucinations, fever, sweating, shivering, fast heart rate, muscle stiffness, twitching, loss of coordination, nausea, vomiting, or diarrhea.   Common side effects may include:  drowsiness, dizziness, tiredness;  swelling;  weight loss;  blurred vision;  diarrhea, constipation; or  stuffy nose. This is not a complete list of side effects and others may occur. Call your doctor for medical advice about side effects. You may report side effects to FDA at 8-758-FDA-9711. What other drugs will affect trazodone? Using trazodone with other drugs that make you drowsy can worsen this effect. Ask your doctor before using opioid medication, a sleeping pill, a muscle relaxer, or medicine for anxiety or seizures. Tell your doctor about all your current medicines. Many drugs can affect trazodone, especially:  any other antidepressants;  phenytoin;  Nuha's wort;  tramadol;  a diuretic or \"water pill\";  medicine to treat anxiety, mood disorders, or mental illness such as schizophrenia;  a blood thinner --warfarin, Coumadin, Jantoven; or  migraine headache medicine --sumatriptan, Imitrex, Maxalt, Treximet, and others. This list is not complete and many other drugs may affect trazodone. This includes prescription and over-the-counter medicines, vitamins, and herbal products. Not all possible drug interactions are listed here. Where can I get more information? Your pharmacist can provide more information about trazodone. Remember, keep this and all other medicines out of the reach of children, never share your medicines with others, and use this medication only for the indication prescribed. Every effort has been made to ensure that the information provided by [address] is accurate, up-to-date, and complete, but no guarantee is made to that effect. Drug information contained herein may be time sensitive. [facility] information has been compiled for use by healthcare practitioners and consumers in the [place] and therefore [facility] does not warrant that uses outside of the [place] are appropriate, unless specifically indicated otherwise.  [facility] information does not endorse drugs, diagnose patients or recommend therapy. [facility]'s drug information is an informational resource designed to assist licensed healthcare practitioners in caring for their patients and/or to serve consumers viewing this service as a supplement to, and not a substitute for, the expertise, skill, knowledge and judgment of healthcare practitioners. The absence of a warning for a given drug or drug combination in no way should be construed to indicate that the drug or drug combination is safe, effective or appropriate for any given patient. [facility] does not assume any responsibility for any aspect of healthcare administered with the aid of information [facility] provides. The information contained herein is not intended to cover all possible uses, directions, precautions, warnings, drug interactions, allergic reactions, or adverse effects. If you have questions about the drugs you are taking, check with your doctor, nurse or pharmacist.  Copyright 2153-1941 [address]: 10.04. Revision date: 6/14/2021. Care instructions adapted under license by [facility] ([facility]). If you have questions about a medical condition or this instruction, always ask your healthcare professional. [place] [address] any warranty or liability for your use of this information. risperidone (injection)  Pronunciation:  ris PER i done  Brand:  Perseris, RisperDAL Consta  What is the most important information I should know about risperidone? Risperidone is not approved for use in older adults with dementia-related psychosis. What is risperidone? Risperidone is an antipsychotic medication. It works by changing the effects of chemicals in the brain. Risperidone injection is used to treat schizophrenia and symptoms of bipolar disorder (manic depression). Risperidone injection is sometimes given with lithium or valproate (Depakene). Risperidone may also be used for purposes not listed in this medication guide.   What should I discuss with my healthcare provider before receiving risperidone? You should not be treated with this medicine if you are allergic to risperidone or paliperidone. Risperidone may increase the risk of death in older adults with dementia-related psychosis and is not approved for this use. Tell your doctor if you have ever had:  heart disease, high blood pressure, or a heart attack;  Parkinson's disease;  trouble swallowing;  low white blood cell (WBC) counts;  diabetes (or risk factors such as obesity or family history of diabetes);  liver or kidney disease;  a seizure;  low bone mineral density;  breast cancer; or  if you are dehydrated. Some people with mental illness have thoughts about suicide. Your doctor should check your progress at regular visits. Your family or other caregivers should also be alert to changes in your mood or symptoms. Using antipsychotic medicine in the last 3 months of pregnancy may cause breathing problems, feeding problems, fussiness, tremors, or withdrawal symptoms in the . If you get pregnant, tell your doctor right away. Do not stop using risperidone without your doctor's advice. If you are pregnant, your name may be listed on a pregnancy registry to track the effects of risperidone on the baby. Tell your doctor if you are breast-feeding. Risperidone can pass into breast milk and may cause drowsiness, fussiness, abnormal muscle movements, or feeding problems in a nursing baby. How is risperidone given? If you already use other antipsychotic medication, you may need to keep using it for a short time. Do not change your dose or dosing schedule without your doctor's advice. You may need to take risperidone by mouth (in pill or liquid form) before you start receiving risperidone injections. Follow your doctor's dosing instructions very carefully. Risperidone is injected under the skin or into a muscle. A healthcare provider will give you this injection.   Risperdal Consta is usually given once every 2 weeks. Perseris is usually given once every month. If you use risperidone injections long-term, your doctor will need to check your progress on a regular basis. What happens if I miss a dose? Call your doctor for instructions if you miss a dose. What happens if I overdose? Seek emergency medical attention or call the Poison Help line at 1-363.692.9018. What should I avoid while receiving risperidone? Avoid drinking alcohol. Dangerous side effects could occur. While you are receiving risperidone, you may be more sensitive to very hot conditions. Avoid becoming overheated or dehydrated. Drink plenty of fluids, especially in hot weather and during exercise. Avoid driving or hazardous activity until you know how this medicine will affect you. Avoid getting up too fast from a sitting or lying position, or you may feel dizzy. Dizziness or drowsiness can cause falls, accidents, or severe injuries. What are the possible side effects of risperidone? Get emergency medical help if you have signs of an allergic reaction: hives; difficult breathing; swelling of your face, lips, tongue, or throat. Call your doctor at once if you have:  uncontrolled muscle movements in your face (chewing, lip smacking, frowning, tongue movement, blinking or eye movement);  breast swelling or tenderness (in men or women), nipple discharge, impotence, lack of interest in sex, missed menstrual periods;  severe nervous system reaction --very stiff (rigid) muscles, high fever, sweating, confusion, fast or uneven heartbeats, tremors, feeling like you might pass out;  high blood sugar --increased thirst, increased urination, dry mouth, fruity breath odor;  low blood cell counts --fever, mouth sores, skin sores, sore throat, cough, easy bruising, unusual bleeding, trouble breathing, feeling light-headed; or  penis erection that is painful or lasts 4 hours or longer.   Serious side effects may be more likely in older adults. Common side effects may include:  headache;  dizziness, drowsiness, tired feeling;  tremors, twitching or uncontrollable muscle movements;  depressed mood, agitation, anxiety, restless feeling;  muscle or joint pain;  dry mouth, upset stomach, constipation;  weight gain; or  pain in your arms or legs. This is not a complete list of side effects and others may occur. Call your doctor for medical advice about side effects. You may report side effects to FDA at 5-918-ISI-6011. What other drugs will affect risperidone? Using risperidone with other drugs that make you sleepy or slow your breathing can cause dangerous side effects or death. Ask your doctor before using opioid medication, a sleeping pill, a muscle relaxer, or medicine for anxiety or seizures. Sometimes it is not safe to use certain medications at the same time. Some drugs can affect your blood levels of other drugs you take, which may increase side effects or make the medications less effective. Other drugs may affect risperidone, including prescription and over-the-counter medicines, vitamins, and herbal products. Tell your doctor about all your current medicines and any medicine you start or stop using. Where can I get more information? Your doctor or pharmacist can provide more information about risperidone injection. Remember, keep this and all other medicines out of the reach of children, never share your medicines with others, and use this medication only for the indication prescribed. Every effort has been made to ensure that the information provided by [address] is accurate, up-to-date, and complete, but no guarantee is made to that effect. Drug information contained herein may be time sensitive.  [facility] information has been compiled for use by healthcare practitioners and consumers in the [place] and therefore Gene does not warrant that uses outside of the [place] are appropriate, unless specifically indicated otherwise. [facility] drug information does not endorse drugs, diagnose patients or recommend therapy. [facility] drug information is an informational resource designed to assist licensed healthcare practitioners in caring for their patients and/or to serve consumers viewing this service as a supplement to, and not a substitute for, the expertise, skill, knowledge and judgment of healthcare practitioners. The absence of a warning for a given drug or drug combination in no way should be construed to indicate that the drug or drug combination is safe, effective or appropriate for any given patient. [facility] does not assume any responsibility for any aspect of healthcare administered with the aid of information [facility] provides. The information contained herein is not intended to cover all possible uses, directions, precautions, warnings, drug interactions, allergic reactions, or adverse effects. If you have questions about the drugs you are taking, check with your doctor, nurse or pharmacist.  Copyright 9634-2861 [address]: 2.03. Revision date: 2/5/2020. Care instructions adapted under license by [facility] ([facility]). If you have questions about a medical condition or this instruction, always ask your healthcare professional. [place] [address] any warranty or liability for your use of this information.

## 2023-02-16 NOTE — CARE COORDINATION
Discharge Arrangements:  Patient is scheduled for follow up with [facility].     Guardian notified: yes- by phone    Discharge destination/address:   Group home- address listed on face sheet    Transported by:  cab

## 2023-02-16 NOTE — BH NOTE
Kat Suresh, [address] ([id number]) phoned to unit and spoke with writer. Pharmacy is unable to fill Zoloft 20 mg/ML concentrated solution 100 mg, and Valproic acid 250 mg/5ml oral solution 750 mg. Patient states medications can be sent to [facility]. Writer called patient legal guardian. Legal guardian provided consent to send medications to [facility]. Dr. Param Campbell notified.

## 2023-02-16 NOTE — GROUP NOTE
Group Therapy Note    Date: 2/16/2023    Group Start Time: 7251  Group End Time: 3378  Group Topic: Psychotherapy    [facility] [place], LISW        Group Therapy Note    Attendees: 4/10       Patient was offered group therapy today but declined to participate despite encouragement from staff. 1:1 was offered.       Signature:  LUCA Lobo

## 2023-02-16 NOTE — DISCHARGE INSTR - DIET

## 2023-02-17 NOTE — CARE COORDINATION
Name: Mia Lo    : 1990    Discharge Date: 2023    Primary Auth/Cert #: [id number]    Destination: Group home    Discharge Medications:      Medication List        START taking these medications      hydrOXYzine HCl 50 MG tablet  Commonly known as: ATARAX  Take 1 tablet by mouth 3 times daily as needed for Anxiety  Notes to patient: Helps lower anxiety      nicotine 21 MG/24HR  Commonly known as: [address] [facility] 1 patch onto the skin daily  Notes to patient: Smoking Cessation      risperiDONE  MG Prsy subcutaneous injection  Commonly known as: PERSERIS  Inject 0.8 mLs into the skin every 30 days  Notes to patient: Long acting Risperdal      traZODone 50 MG tablet  Commonly known as: DESYREL  Take 1 tablet by mouth nightly as needed for Sleep  Notes to patient: Helps with sleep            CONTINUE taking these medications      sertraline 20 MG/ML concentrated solution  Commonly known as: ZOLOFT  Take 5 mLs by mouth daily  Notes to patient: Antidepressant, helps lower depression      valproic acid 250 MG/5ML Soln oral solution  Commonly known as: DEPAKENE  Take 15 mLs by mouth 2 times daily  Notes to patient: Mood Stabilizer            STOP taking these medications      benztropine 1 MG tablet  Commonly known as: COGENTIN     OLANZapine 20 MG tablet  Commonly known as: ZYPREXA               Where to Get Your Medications        These medications were sent to [facility] [address] [place] [id number]      Phone: [phone]   hydrOXYzine HCl 50 MG tablet  nicotine 21 MG/24HR  sertraline 20 MG/ML concentrated solution  traZODone 50 MG tablet  valproic acid 250 MG/5ML Soln oral solution       Information about where to get these medications is not yet available    Ask your nurse or doctor about these medications  risperiDONE  MG Prsy subcutaneous injection     Pharmacy Instructions:    Sent from [address] [place] [address]            Follow Up Appointment: [address] [place] [address] [phone]  Follow up on 2/21/2023  You are scheduled at 12:45 PM with nurse Dante Morris

## 2023-04-28 ENCOUNTER — HOSPITAL ENCOUNTER (EMERGENCY)
Age: 33
Discharge: HOME | End: 2023-04-28
Attending: EMERGENCY MEDICINE
Payer: COMMERCIAL

## 2023-04-28 VITALS
WEIGHT: 165 LBS | TEMPERATURE: 98.1 F | HEART RATE: 108 BPM | OXYGEN SATURATION: 100 % | RESPIRATION RATE: 14 BRPM | BODY MASS INDEX: 21.87 KG/M2 | DIASTOLIC BLOOD PRESSURE: 86 MMHG | SYSTOLIC BLOOD PRESSURE: 167 MMHG | HEIGHT: 73 IN

## 2023-04-28 DIAGNOSIS — F20.0 PARANOID SCHIZOPHRENIA (HCC): Primary | ICD-10-CM

## 2023-04-28 PROCEDURE — 99283 EMERGENCY DEPT VISIT LOW MDM: CPT

## 2023-04-28 PROCEDURE — 6370000000 HC RX 637 (ALT 250 FOR IP): Performed by: EMERGENCY MEDICINE

## 2023-04-28 RX ORDER — HALOPERIDOL 2 MG/1
2 TABLET ORAL ONCE
Status: COMPLETED | OUTPATIENT
Start: 2023-04-28 | End: 2023-04-28

## 2023-04-28 RX ADMIN — HALOPERIDOL 2 MG: 2 TABLET ORAL at 17:25

## 2023-04-28 NOTE — ED PROVIDER NOTES
EMERGENCY DEPARTMENT ENCOUNTER    Pt Name: Reggie Reyes  MRN: [id number]  [place] 1990  Date of evaluation: 4/28/23  CHIEF COMPLAINT       Chief Complaint   Patient presents with    Mental Health Problem     HISTORY OF PRESENT ILLNESS   HPI  Here today from his group home  He states ever since 2013 he knows where Joey [place] is in [place]  He states he knows where they keep their chemical weapons and Anthrax. He states he is in a group home, its not a staff group home, they do provide his discretionary money to him. However he states the food is cheap, just a bologna sandwich or noodle Ramen, very inexpensive food. He does not think that they are spending the money appropriately on food for them. Is compliant with his medications, takes trazodone, Atarax, Zoloft, Depakote. He also gets long-acting injections monthly of risperidone. I speak with the patient's legal guardian Arturolizeth Rondon, he is the appointed guardian. He states Celeste Dea is part of the [facility] act team, and they are currently working on getting him into a different group home. His group home is:  [address] [place] [address]  Group home of Juan Jose Guzman  This group home is not staffed, they mostly help with food and needs, he gets discretionary money, and is free to come and go    REVIEW OF SYSTEMS     Review of Systems   All other systems reviewed and are negative. PASTMEDICAL HISTORY     Past Medical History:   Diagnosis Date    Hypertension     Schizophrenia, schizo-affective [facility])      Past Problem List  Patient Active Problem List   Diagnosis Code    Schizophrenia ([facility] F20.9    Cannabis dependence ([facility] F12.20    Chest pain R07.9    Acute psychosis ([facility] F23     SURGICAL HISTORY     History reviewed. No pertinent surgical history.   CURRENT MEDICATIONS       Previous Medications    NICOTINE (NICODERM CQ) 21 MG/24HR    Place 1 patch onto the skin daily    RISPERIDONE ER (PERSERIS) 120 MG PRSY SUBCUTANEOUS INJECTION    Inject 0.8 mLs into the skin every

## 2023-04-28 NOTE — ED NOTES
Pt brought self to ED via cab wanting a psych eval. Pt denies any suicidal/homicidal ideations or auditory/visual hallucinations. States that he is compliant with his medication. Pt reports complaints about his current group home and would like assistance to find a different one. Belongings and patient checked by security. Belongings locked up. Pt in blue gown.         Casper Soulier, CHAYITO  85/79/56 0316

## 2023-05-12 ENCOUNTER — HOSPITAL ENCOUNTER (INPATIENT)
Age: 33
LOS: 1 days | Discharge: HOME | DRG: 750 | End: 2023-05-13
Attending: EMERGENCY MEDICINE | Admitting: PSYCHIATRY & NEUROLOGY
Payer: MEDICAID

## 2023-05-12 DIAGNOSIS — F22 DELUSIONAL DISORDER (HCC): Primary | ICD-10-CM

## 2023-05-12 LAB
ABSOLUTE EOS #: 0.1 K/UL (ref 0–0.4)
ABSOLUTE LYMPH #: 1.8 K/UL (ref 1–4.8)
ABSOLUTE MONO #: 1 K/UL (ref 0.1–1.3)
AMPHETAMINE SCREEN URINE: NEGATIVE
ANION GAP SERPL CALCULATED.3IONS-SCNC: 13 MMOL/L (ref 9–17)
BARBITURATE SCREEN URINE: NEGATIVE
BASOPHILS # BLD: 1 % (ref 0–2)
BASOPHILS ABSOLUTE: 0.1 K/UL (ref 0–0.2)
BENZODIAZEPINE SCREEN, URINE: NEGATIVE
BILIRUBIN URINE: NEGATIVE
BUN SERPL-MCNC: 10 MG/DL (ref 6–20)
CALCIUM SERPL-MCNC: 9.5 MG/DL (ref 8.6–10.4)
CANNABINOID SCREEN URINE: POSITIVE
CHLORIDE SERPL-SCNC: 101 MMOL/L (ref 98–107)
CO2 SERPL-SCNC: 25 MMOL/L (ref 20–31)
COCAINE METABOLITE, URINE: NEGATIVE
COLOR: YELLOW
COMMENT UA: ABNORMAL
CREAT SERPL-MCNC: 0.9 MG/DL (ref 0.7–1.2)
EOSINOPHILS RELATIVE PERCENT: 0 % (ref 0–4)
ETHANOL PERCENT: <0.01 %
ETHANOL: <10 MG/DL
FENTANYL URINE: NEGATIVE
GFR SERPL CREATININE-BSD FRML MDRD: >60 ML/MIN/1.73M2
GLUCOSE SERPL-MCNC: 108 MG/DL (ref 70–99)
GLUCOSE UR STRIP.AUTO-MCNC: NEGATIVE MG/DL
HCT VFR BLD AUTO: 42.3 % (ref 41–53)
HGB BLD-MCNC: 14.6 G/DL (ref 13.5–17.5)
KETONES UR STRIP.AUTO-MCNC: ABNORMAL MG/DL
LEUKOCYTE ESTERASE UR QL STRIP.AUTO: NEGATIVE
LYMPHOCYTES # BLD: 13 % (ref 24–44)
MCH RBC QN AUTO: 32.1 PG (ref 26–34)
MCHC RBC AUTO-ENTMCNC: 34.4 G/DL (ref 31–37)
MCV RBC AUTO: 93.3 FL (ref 80–100)
METHADONE SCREEN, URINE: NEGATIVE
MONOCYTES # BLD: 8 % (ref 1–7)
NITRITE UR QL STRIP.AUTO: NEGATIVE
OPIATES, URINE: NEGATIVE
OXYCODONE SCREEN URINE: NEGATIVE
PDW BLD-RTO: 14.7 % (ref 11.5–14.9)
PHENCYCLIDINE, URINE: NEGATIVE
PLATELET # BLD AUTO: 255 K/UL (ref 150–450)
PMV BLD AUTO: 6.7 FL (ref 6–12)
POTASSIUM SERPL-SCNC: 4.6 MMOL/L (ref 3.7–5.3)
PROT UR STRIP.AUTO-MCNC: 7.5 MG/DL (ref 5–8)
PROT UR STRIP.AUTO-MCNC: NEGATIVE MG/DL
RBC # BLD: 4.54 M/UL (ref 4.5–5.9)
SEG NEUTROPHILS: 78 % (ref 36–66)
SEGMENTED NEUTROPHILS ABSOLUTE COUNT: 10.8 K/UL (ref 1.3–9.1)
SODIUM SERPL-SCNC: 139 MMOL/L (ref 135–144)
SPECIFIC GRAVITY UA: 1.01 (ref 1–1.03)
TEST INFORMATION: ABNORMAL
TURBIDITY: CLEAR
URINE HGB: NEGATIVE
UROBILINOGEN, URINE: NORMAL
WBC # BLD AUTO: 13.8 K/UL (ref 3.5–11)

## 2023-05-12 PROCEDURE — G0480 DRUG TEST DEF 1-7 CLASSES: HCPCS

## 2023-05-12 PROCEDURE — 6370000000 HC RX 637 (ALT 250 FOR IP): Performed by: PSYCHIATRY & NEUROLOGY

## 2023-05-12 PROCEDURE — 1240000000 HC EMOTIONAL WELLNESS R&B

## 2023-05-12 PROCEDURE — 99285 EMERGENCY DEPT VISIT HI MDM: CPT

## 2023-05-12 PROCEDURE — 80048 BASIC METABOLIC PNL TOTAL CA: CPT

## 2023-05-12 PROCEDURE — G0378 HOSPITAL OBSERVATION PER HR: HCPCS

## 2023-05-12 PROCEDURE — 80307 DRUG TEST PRSMV CHEM ANLYZR: CPT

## 2023-05-12 PROCEDURE — 36415 COLL VENOUS BLD VENIPUNCTURE: CPT

## 2023-05-12 PROCEDURE — 85025 COMPLETE CBC W/AUTO DIFF WBC: CPT

## 2023-05-12 PROCEDURE — 81003 URINALYSIS AUTO W/O SCOPE: CPT

## 2023-05-12 RX ORDER — NICOTINE 21 MG/24HR
1 PATCH, TRANSDERMAL 24 HOURS TRANSDERMAL DAILY
Status: DISCONTINUED | OUTPATIENT
Start: 2023-05-13 | End: 2023-05-13 | Stop reason: HOSPADM

## 2023-05-12 RX ORDER — ACETAMINOPHEN 325 MG/1
650 TABLET ORAL EVERY 4 HOURS PRN
Status: DISCONTINUED | OUTPATIENT
Start: 2023-05-12 | End: 2023-05-13 | Stop reason: HOSPADM

## 2023-05-12 RX ORDER — TRAZODONE HYDROCHLORIDE 50 MG/1
50 TABLET ORAL NIGHTLY PRN
Status: DISCONTINUED | OUTPATIENT
Start: 2023-05-13 | End: 2023-05-13 | Stop reason: HOSPADM

## 2023-05-12 RX ORDER — POLYETHYLENE GLYCOL 3350 17 G/17G
17 POWDER, FOR SOLUTION ORAL DAILY PRN
Status: DISCONTINUED | OUTPATIENT
Start: 2023-05-12 | End: 2023-05-13 | Stop reason: HOSPADM

## 2023-05-12 RX ORDER — IBUPROFEN 400 MG/1
400 TABLET ORAL EVERY 6 HOURS PRN
Status: DISCONTINUED | OUTPATIENT
Start: 2023-05-12 | End: 2023-05-13 | Stop reason: HOSPADM

## 2023-05-12 RX ORDER — HYDROXYZINE 50 MG/1
50 TABLET, FILM COATED ORAL 3 TIMES DAILY PRN
Status: DISCONTINUED | OUTPATIENT
Start: 2023-05-12 | End: 2023-05-13 | Stop reason: HOSPADM

## 2023-05-12 RX ORDER — MAGNESIUM HYDROXIDE/ALUMINUM HYDROXICE/SIMETHICONE 120; 1200; 1200 MG/30ML; MG/30ML; MG/30ML
30 SUSPENSION ORAL EVERY 6 HOURS PRN
Status: DISCONTINUED | OUTPATIENT
Start: 2023-05-12 | End: 2023-05-13 | Stop reason: HOSPADM

## 2023-05-12 RX ADMIN — HYDROXYZINE HYDROCHLORIDE 50 MG: 50 TABLET, FILM COATED ORAL at 23:15

## 2023-05-12 ASSESSMENT — ENCOUNTER SYMPTOMS
TROUBLE SWALLOWING: 0
PHOTOPHOBIA: 0
VOMITING: 0
DIARRHEA: 0
COUGH: 0
ABDOMINAL PAIN: 0
SHORTNESS OF BREATH: 0
NAUSEA: 0
COLOR CHANGE: 0

## 2023-05-12 ASSESSMENT — SLEEP AND FATIGUE QUESTIONNAIRES
DO YOU HAVE DIFFICULTY SLEEPING: NO
DO YOU USE A SLEEP AID: NO
AVERAGE NUMBER OF SLEEP HOURS: 6

## 2023-05-12 ASSESSMENT — PAIN - FUNCTIONAL ASSESSMENT: PAIN_FUNCTIONAL_ASSESSMENT: NONE - DENIES PAIN

## 2023-05-12 NOTE — ED TRIAGE NOTES
Mode of arrival (squad #, walk in, police, etc) : [facility]        Chief complaint(s): Mental Health Evaluation/Delusional        Arrival Note (brief scenario, treatment PTA, etc). : pt called the police to bring him to [facility] after having delusional and paranoid thoughts that [employer] ops are following him. Pt denies drug or alcohol use. Denies SI or HI.         C= \"Have you ever felt that you should Cut down on your drinking? \"  No  A= \"Have people Annoyed you by criticizing your drinking? \"  No  G= \"Have you ever felt bad or Guilty about your drinking? \"  No  E= \"Have you ever had a drink as an Eye-opener first thing in the morning to steady your nerves or to help a hangover? \"  No      Deferred []      Reason for deferring: N/A    *If yes to two or more: probable alcohol abuse. *

## 2023-05-13 VITALS
SYSTOLIC BLOOD PRESSURE: 106 MMHG | BODY MASS INDEX: 23.57 KG/M2 | HEIGHT: 72 IN | OXYGEN SATURATION: 98 % | RESPIRATION RATE: 14 BRPM | WEIGHT: 174 LBS | TEMPERATURE: 97.9 F | DIASTOLIC BLOOD PRESSURE: 74 MMHG | HEART RATE: 86 BPM

## 2023-05-13 PROBLEM — F20.0 PARANOID SCHIZOPHRENIA, CHRONIC CONDITION (HCC): Status: ACTIVE | Noted: 2023-05-13

## 2023-05-13 PROCEDURE — 6370000000 HC RX 637 (ALT 250 FOR IP): Performed by: NURSE PRACTITIONER

## 2023-05-13 PROCEDURE — G0378 HOSPITAL OBSERVATION PER HR: HCPCS

## 2023-05-13 PROCEDURE — 6370000000 HC RX 637 (ALT 250 FOR IP): Performed by: PSYCHIATRY & NEUROLOGY

## 2023-05-13 RX ORDER — SERTRALINE HYDROCHLORIDE 20 MG/ML
100 SOLUTION ORAL DAILY
Status: DISCONTINUED | OUTPATIENT
Start: 2023-05-13 | End: 2023-05-13 | Stop reason: HOSPADM

## 2023-05-13 RX ORDER — VALPROIC ACID 250 MG/5ML
750 SOLUTION ORAL 2 TIMES DAILY
Status: DISCONTINUED | OUTPATIENT
Start: 2023-05-13 | End: 2023-05-13 | Stop reason: HOSPADM

## 2023-05-13 RX ADMIN — SERTRALINE HYDROCHLORIDE 100 MG: 20 SOLUTION, CONCENTRATE ORAL at 13:00

## 2023-05-13 RX ADMIN — VALPROIC ACID 750 MG: 250 SOLUTION ORAL at 13:00

## 2023-05-13 NOTE — DISCHARGE INSTRUCTIONS
Line)  [phone]      Recovery Help line- [phone]      To obtain results of pending studies call Medical Records at: [phone]     For emergencies and 24 hour/7 days a week contact information:  [phone]

## 2023-05-13 NOTE — PLAN OF CARE
reinforcement    PATIENT GOALS: to be discussed with patient within 72 hours    PLAN/TREATMENT RECOMMENDATIONS:     continue group therapy , medications compliance, goal setting, individualized assessments and care, continue to monitor pt on unit      SHORT-TERM GOALS:   Time frame for Short-Term Goals: 5-7 days    LONG-TERM GOALS:  Time frame for Long-Term Goals: 6 months  Members Present in Team Meeting: See Signature Sheet    Edward Chaidez RN

## 2023-05-13 NOTE — H&P
Denies homicidal ideations               Denies hallucinations              Presents with fixed delusions              Presents with paranoia  Cognition:  Oriented to self, location, time, situation  Concentration: Clinically adequate  Memory: Intact  Insight & Judgment: Poor to limited         DSM-5 Diagnosis    Principal Problem: Paranoid schizophrenia, chronic condition ([facility]    Psychosocial and Contextual factors:  Financial   Occupational   Relationship   Legal   Living situation X  Educational     Past Medical History:   Diagnosis Date    Hypertension     Schizophrenia, schizo-affective ([facility]         PLAN:  Continue inpatient psychiatric treatment. Home medications reviewed, verification pending   Order valproic acid 750 mg by mouth 2 times daily  Order Zoloft solution 100 mg by mouth daily  Date of most recent Perseris injection pending verification  Monitor need and frequency of PRN medications. Attempt to develop insight. Follow-up daily while inpatient. Reviewed medications risks and benefits as well as potential side effects with patient. CONSULT:  [x] Yes [] No  Internal medicine for medical management/medical H&P    Risk Management: close watch per standard protocol    Psychotherapy: participation in milieu and group and individual sessions with Attending Physician,  and Physician Assistant/CNP    Estimated length of stay:  1-2 days      GENERAL PATIENT/FAMILY EDUCATION  Patient will understand basic signs and symptoms, patient will understand benefits/risks and potential side effects from proposed medications, and patient will understand their role in recovery. Family is not active in patient's care. Patient assets that may be helpful during treatment include: Intent to participate and engage in treatment, sufficient fund of knowledge and intellect to understand and utilize treatments. Goals:    1) Remission of Delusions. 2) Stabilization of symptoms prior to discharge.   3)

## 2023-05-13 NOTE — BH NOTE
Patient given tobacco quitline number [phone] at this time, refusing to call at this time, states \" I just dont want to quit now\"- patient given information as to the dangers of long term tobacco use. Continue to reinforce the importance of tobacco cessation.

## 2023-05-13 NOTE — BH NOTE
[facility]  Admission Note     Admission Type:   Admission Type: Involuntary    Reason for admission:  Reason for Admission: Paranoia, believes GRISELDA is after him and that he is Nancy the Muslim      Addictive Behavior:   Addictive Behavior  In the Past 3 Months, Have You Felt or Has Someone Told You That You Have a Problem With  : None    Medical Problems:   Past Medical History:   Diagnosis Date    Hypertension     Schizophrenia, schizo-affective ([address]        Status EXAM:  Mental Status and Behavioral Exam  Normal: Yes  Level of Assistance: Independent/Self  Facial Expression: Flat, Sad  Affect: Appropriate  Level of Consciousness: Alert  Frequency of Checks: 4 times per hour, close  Mood:Normal: No  Mood: Anxious, Suspicious  Motor Activity:Normal: Yes  Eye Contact: Fair  Observed Behavior: Preoccupied, Impulsive  Sexual Misconduct History: Current - no  Preception: [place] to person, [place] to time, [place] to place, [place] to situation  Attention:Normal: No  Attention: Distractible  Thought Processes: Circumstantial, Loose association  Thought Content:Normal: No  Thought Content: Preoccupations, Paranoia  Depression Symptoms: No problems reported or observed.   Anxiety Symptoms: Generalized, Unexplained fears  Deana Symptoms: Flight of ideas, Poor judgment  Hallucinations: None  Delusions: Yes  Delusions: Paranoid  Memory:Normal: No  Memory: Poor recent  Insight and Judgment: No  Insight and Judgment: Poor judgment, Poor insight    Tobacco Screening:  Practical Counseling, on admission, taryn X, if applicable and completed (first 3 are required if patient doesn't refuse):            ( ) Recognizing danger situations (included triggers and roadblocks)                    ( ) Coping skills (new ways to manage stress,relaxation techniques, changing routine, distraction)                                                           ( ) Basic information about quitting (benefits of quitting, techniques in

## 2023-05-13 NOTE — ED PROVIDER NOTES
EMERGENCY DEPARTMENT ENCOUNTER    Pt Name: Kar Underwood  MRN: [id number]  [place] 1990  Date of evaluation: 5/12/23  CHIEF COMPLAINT       Chief Complaint   Patient presents with    Mental Health Problem     HISTORY OF PRESENT ILLNESS   26-year-old male presenting to the ER in a delusional state. Patient is stating that GRISELDA is in his group home and is there to hurt him. Patient is also stating that he killed the [employer]. Patient called the police himself in order to protect himself from [address] [place] [address]. Patient denies any suicidal or homicidal ideation. The history is provided by the patient. Mental Health Problem  Presenting symptoms: delusional    Presenting symptoms: no agitation and no suicidal thoughts    Associated symptoms: no abdominal pain, no chest pain and no fatigue          REVIEW OF SYSTEMS     Review of Systems   Constitutional:  Negative for activity change, fatigue and fever. HENT:  Negative for congestion, ear pain and trouble swallowing. Eyes:  Negative for photophobia and visual disturbance. Respiratory:  Negative for cough and shortness of breath. Cardiovascular:  Negative for chest pain and palpitations. Gastrointestinal:  Negative for abdominal pain, diarrhea, nausea and vomiting. Genitourinary:  Negative for dysuria, flank pain and urgency. Musculoskeletal:  Negative for arthralgias and myalgias. Skin:  Negative for color change and rash. Neurological:  Negative for dizziness and facial asymmetry. Psychiatric/Behavioral:  Negative for agitation, behavioral problems and suicidal ideas. PASTMEDICAL HISTORY     Past Medical History:   Diagnosis Date    Hypertension     Schizophrenia, schizo-affective [facility])      Past Problem List  Patient Active Problem List   Diagnosis Code    Schizophrenia ([facility] F20.9    Cannabis dependence ([facility] F12.20    Chest pain R07.9    Acute psychosis ([facility] F23     SURGICAL HISTORY     No past surgical history on file.   CURRENT

## 2023-05-13 NOTE — CARE COORDINATION
BHI Biopsychosocial Assessment    Current Level of Psychosocial Functioning     Independent   Dependent  XX   Minimal Assist       Psychosocial High Risk Factors (check all that apply)    Unable to obtain meds   Chronic illness/pain    Substance abuse  XX   Lack of Family Support  XX   Financial stress   Isolation   Inadequate Community Resources  Suicide attempt(s)  Not taking medications    Victim of crime   Developmental Delay  Unable to manage personal needs    Age 72 or older   Homeless  No transportation   Readmission within 30 days  Unemployment  Traumatic Event      Psychiatric Advanced Directives: denies     Family to Involve in Treatment:  Patient has legal guardian Kim Yoko [phone]    Sexual Orientation:  NA    Patient Strengths: Patient has insurance and income;  patient has stable housing; linked with [address];     Patient Barriers:  poor coping skills; legal issues; substance abuse       Opiate Education Provided:  NA      CMHC/mental health history: Patient has multiple previous psychiatric admissions at [place] [address]; [facility]   medication management, group/individual therapies, family meetings, psycho -education, treatment team meetings to assist with stabilization    Initial Discharge Plan:   Patient will return to group home and follow upw with [facility]. Group home  is Rachel [facility] [phone]      Clinical Summary:  Patient is a 27 y.o. male admitted to the [facility] for acute psychosis. Patient presents with paranoia and believes the [employer] opps are following him. Patient has previous admissions to [facility] and hx with [address] Patient reports GRISELDA is in his group home and he is requesting a new group home. Patient denies suicidal ideation or depressive symptoms. Patient is linked with [facility] center and reports compliance with medications. Patient UDS positive for marijuana upon admission. Patient has a legal guardian Kim Yoko [phone].        SW will continue to engage

## 2023-05-13 NOTE — PROGRESS NOTES
Behavioral Services  Medicare Certification Upon Admission    I certify that this patient's inpatient psychiatric hospital admission is medically necessary for:    [x] (1) Treatment which could reasonably be expected to improve this patient's condition,       [x] (2) Or for diagnostic study;     AND     [x](2) The inpatient psychiatric services are provided while the individual is under the care of a physician and are included in the individualized plan of care.     Estimated length of stay/service 4-7 days    Plan for post-hospital care home with outpatient [facility] f/u    Electronically signed by Darline Peña MD on 5/13/2023 at 7:39 AM

## 2023-05-13 NOTE — DISCHARGE INSTR - DIET

## 2023-05-13 NOTE — DISCHARGE SUMMARY
Provider Discharge Summary     Patient ID:  Hilario Mcelroy  [id number]  1 y.o.  1990    Admit date: 5/12/2023    Discharge date and time: 5/13/2023  6:16 PM     Admitting Physician: Yaakov Arenas MD     Discharge Physician: ROBERTO Hou - CNP    Admission Diagnoses: Delusional disorder ([address] [F22]  Acute psychosis ([address] [F23]    Discharge Diagnoses:   Paranoid schizophrenia, chronic condition ([address]     Patient Active Problem List   Diagnosis Code    Schizophrenia ([address] F20.9    Cannabis dependence ([address] F12.20    Chest pain R07.9    Acute psychosis ([address] F23    Paranoid schizophrenia, chronic condition ([facility] F20.0        Admission Condition: poor    Discharged Condition: stable    Indication for Admission: threat to self    History of Present Illnes (present tense wording is of findings from admission exam and are not necessarily indicative of current findings):   Hilario Mcelroy is a 27 y.o. male who has a past medical history of hypertension and schizophrenia. Patient presented to the ED via [facility] police after calling them to his group home requesting to be taken to the hospital.  Patient presented with several delusional and paranoid beliefs regarding believing that Tj Mcdermott is in his group home and neighborhood and that he killed the [employer]. Patient denied suicidal and homicidal ideation. He denied auditory and visual hallucinations. Patient is known to this facility and was recently inpatient at [facility] in February 2023. Patient was seen for initial evaluation today. He was resting in bed on approach but awake and initially agreeable to conversation. Writer asked patient to revisit events which led to his hospitalization. He expressed delusional statements that he is a special operative in the [facility] that he called \"[employer] service\" and that he is being targeted by [employer] in his neighborhood.   He reports he killed a \"jihadist\" in 2017 that was actually the [employer] and now

## 2023-05-13 NOTE — BH NOTE
Patient given tobacco quitline number 14250358923 at this time, refusing to call at this time, states \" I just dont want to quit now\"- patient given information as to the dangers of long term tobacco use. Continue to reinforce the importance of tobacco cessation.

## 2023-05-13 NOTE — ED NOTES
Provisional Diagnosis:     Patient presented to ED via [employer] for a mental health evaluation. Patient has history of schizoaffective disorder. Psychosocial and Contextual Factors:     Patient has guardian    C-SSRS Summary:    Patient denies    Patient: X   Family:   Agency: X ([employer])    Substance Abuse  Patient denies    Present Suicidal Behavior:    Patient denies    Verbal:     Attempt:    Past Suicidal Behavior:   Patient denies    Verbal:    Attempt:    Self-Injurious/Self-Mutilation:  Patient denies    Violence Current or Past   None documented or identified. Trauma Identified:    Patient's delusion is that he is being followed/attacked by [employer]    Protective Factors:    Patient has guardian. Patient has housing. Patient linked with [employer]. Patient med compliant. Risk Factors:    Patient has poor insight. Patient delusional.    Clinical Summary:    Patient is a 27year old  male who presented to ED via [employer] for a mental health evaluation. Patient has history of schizoaffective disorder and is linked with [address] [facility] [facility].. Patient reports he is med compliant. Patient has a history of delusional thoughts, believing [employer] is after him. Patient is presenting at his baseline. Patient is oriented to place and time. Patient denies SI or HI. Patient is able to meet his basic needs. Patient lives in a group home with staffing. Patient reports typical sleep and typical appetite. Patient does report some feelings of being unsafe at home, relative to his delusion. Police brought him to [facility], allegedly at his request.  Patient denies any other complaints at this time. Level of Care Disposition: This writer consulted with psychiatry who recommended inpatient hospitalization for safety and stabilization. Patient placed on application for emergency admission due to not being able to reach his guardian.

## 2023-05-15 NOTE — CARE COORDINATION
Name: Delmus Gowers    : 1990    Discharge Date: 23    Primary Auth/Cert #:     Destination: group home     Discharge Medications:      Medication List        CONTINUE taking these medications      risperiDONE  MG Prsy subcutaneous injection  Commonly known as: PERSERIS  Inject 0.8 mLs into the skin every 30 days  Notes to patient: Clear thoughts     sertraline 20 MG/ML concentrated solution  Commonly known as: ZOLOFT  Take 5 mLs by mouth daily  Notes to patient: Improve mood     traZODone 50 MG tablet  Commonly known as: DESYREL  Take 1 tablet by mouth nightly as needed for Sleep     valproic acid 250 MG/5ML Soln oral solution  Commonly known as: DEPAKENE  Take 15 mLs by mouth 2 times daily  Notes to patient: Stabilize mood            STOP taking these medications      nicotine 21 MG/24HR  Commonly known as: [address]              Follow Up Appointment: [address] [place] [address] 86 [phone]  Follow up on 5/15/2023  Social work will contact you on Monday 5/15 with your follow up appointment

## 2023-11-12 ENCOUNTER — HOSPITAL ENCOUNTER (EMERGENCY)
Age: 33
Discharge: HOME | End: 2023-11-12
Attending: EMERGENCY MEDICINE
Payer: MEDICAID

## 2023-11-12 VITALS
SYSTOLIC BLOOD PRESSURE: 168 MMHG | DIASTOLIC BLOOD PRESSURE: 89 MMHG | RESPIRATION RATE: 18 BRPM | OXYGEN SATURATION: 96 % | HEART RATE: 115 BPM | WEIGHT: 164 LBS | TEMPERATURE: 98 F | BODY MASS INDEX: 22.24 KG/M2

## 2023-11-12 DIAGNOSIS — F20.9 SCHIZOPHRENIA, UNSPECIFIED TYPE (HCC): Primary | ICD-10-CM

## 2023-11-12 LAB — VALPROATE SERPL-MCNC: 13 UG/ML (ref 50–125)

## 2023-11-12 PROCEDURE — 80164 ASSAY DIPROPYLACETIC ACD TOT: CPT

## 2023-11-12 PROCEDURE — 99283 EMERGENCY DEPT VISIT LOW MDM: CPT

## 2023-11-12 RX ORDER — VALPROIC ACID 250 MG/5ML
750 SOLUTION ORAL ONCE
Status: DISCONTINUED | OUTPATIENT
Start: 2023-11-12 | End: 2023-11-12 | Stop reason: HOSPADM

## 2023-11-12 ASSESSMENT — ENCOUNTER SYMPTOMS
ABDOMINAL PAIN: 0
SHORTNESS OF BREATH: 0
BACK PAIN: 0
ABDOMINAL DISTENTION: 0

## 2023-11-12 NOTE — ED NOTES
Patient is presenting to ED with concerns that he is not receiving medication at the group home. SW called patient's guardian Jackson Garvin. He stated he does not have a list of patient's medications but stated the group home [address] [place] [address] [phone] should have the list. Phone number given to RN to call and verify list of home medications. Patient is on [facility] team.  Jackson Garvin gave verbal consent to treat-registration notified.      Ingrid Weller, [place]  11/12/23 [id number]

## 2023-11-12 NOTE — ED PROVIDER NOTES
[address] [place] [address] ED  Emergency Department Encounter  EmergencyMedicine Resident     Pt Name:Anthony Grimaldo  MRN: [id number]  [facility] 1990  Date of evaluation: 11/12/23  PCP:  No primary care provider on file. CHIEF COMPLAINT       Chief Complaint   Patient presents with    OTHER     Unsure what exactly patient is here for       HISTORY OF PRESENT ILLNESS  (Location/Symptom, Timing/Onset, Context/Setting, Quality, Duration, Modifying Factors, Severity.)      Fredo Earl is a 26 y.o. male who presents with to the ED stating that he has noticed increased brain activity, feeling of static shock in the left side of his head. He is also complaining that he is unsure of how his facility is administering his Depakene, that the probably doing it wrong. He denies headache, changes in vision, seizure, syncope, focal weakness, chest pain, abdominal pain, tremors. He states that he walked along with the community. He is alert and oriented to person place and time. PAST MEDICAL / SURGICAL / SOCIAL / FAMILY HISTORY      has a past medical history of Hypertension and Schizophrenia, schizo-affective ([address]). has no past surgical history on file.       Social History     Socioeconomic History    Marital status: Single     Spouse name: Not on file    Number of children: Not on file    Years of education: Not on file    Highest education level: Not on file   Occupational History    Not on file   Tobacco Use    Smoking status: Every Day    Smokeless tobacco: Never    Tobacco comments:     pt on nicotine patch   Vaping Use    Vaping Use: Never used   Substance and Sexual Activity    Alcohol use: No    Drug use: Yes     Types: Marijuana Desai Romero)    Sexual activity: Not Currently   Other Topics Concern    Not on file   Social History Narrative    Not on file     Social Determinants of Health     Financial Resource Strain: Not on file   Food Insecurity: Not on file   Transportation Needs: Not on

## 2023-11-12 NOTE — ED NOTES
Pt states to writer \"I am leaving because I know no one told me I have black poison in my  blood. I am just going to leave. \" Pt seen exiting ERErick Benson RN  11/12/23 [id number]

## 2024-04-14 ENCOUNTER — APPOINTMENT (OUTPATIENT)
Dept: GENERAL RADIOLOGY | Age: 34
End: 2024-04-14
Payer: COMMERCIAL

## 2024-04-14 ENCOUNTER — HOSPITAL ENCOUNTER (EMERGENCY)
Age: 34
Discharge: HOME | End: 2024-04-14
Attending: EMERGENCY MEDICINE
Payer: COMMERCIAL

## 2024-04-14 VITALS
DIASTOLIC BLOOD PRESSURE: 92 MMHG | RESPIRATION RATE: 16 BRPM | OXYGEN SATURATION: 100 % | SYSTOLIC BLOOD PRESSURE: 151 MMHG | TEMPERATURE: 97.7 F | HEART RATE: 127 BPM

## 2024-04-14 DIAGNOSIS — S90.414A ABRASION OF TOE OF RIGHT FOOT, INITIAL ENCOUNTER: Primary | ICD-10-CM

## 2024-04-14 PROCEDURE — 73630 X-RAY EXAM OF FOOT: CPT

## 2024-04-14 PROCEDURE — 90471 IMMUNIZATION ADMIN: CPT | Performed by: PEDIATRICS

## 2024-04-14 PROCEDURE — 6360000002 HC RX W HCPCS: Performed by: PEDIATRICS

## 2024-04-14 PROCEDURE — 90715 TDAP VACCINE 7 YRS/> IM: CPT | Performed by: PEDIATRICS

## 2024-04-14 PROCEDURE — 99284 EMERGENCY DEPT VISIT MOD MDM: CPT

## 2024-04-14 RX ADMIN — TETANUS TOXOID, REDUCED DIPHTHERIA TOXOID AND ACELLULAR PERTUSSIS VACCINE, ADSORBED 0.5 ML: 5; 2.5; 8; 8; 2.5 SUSPENSION INTRAMUSCULAR at 16:18

## 2024-04-14 ASSESSMENT — PAIN SCALES - GENERAL: PAINLEVEL_OUTOF10: 5

## 2024-04-14 ASSESSMENT — PAIN - FUNCTIONAL ASSESSMENT: PAIN_FUNCTIONAL_ASSESSMENT: 0-10

## 2024-04-14 NOTE — ED NOTES
Pt arrived to ED for right big toe pain.  Pt states he dropped \"a thing\" on it while working on a car today and think it's infected so he wanted to be evaluated.  Patient alert and oriented x4, talking in complete sentences. Respirations even and unlabored. Call light in reach, all needs met at this time.

## 2024-04-14 NOTE — ED NOTES
Writer arranged transportation through his insurance company for patient at discharge to return to his group home.  Trip number [id number]

## 2024-04-14 NOTE — ED PROVIDER NOTES
[facility]     Emergency Department     Faculty Attestation    I performed a history and physical examination of the patient and discussed management with the resident. I reviewed the resident’s note and agree with the documented findings and plan of care. Any areas of disagreement are noted on the chart. I was personally present for the key portions of any procedures. I have documented in the chart those procedures where I was not present during the key portions. I have reviewed the emergency nurses triage note. I agree with the chief complaint, past medical history, past surgical history, allergies, medications, social and family history as documented unless otherwise noted below.        For Physician Assistant/ Nurse Practitioner cases/documentation I have personally evaluated this patient and have completed at least one if not all key elements of the E/M (history, physical exam, and MDM). Additional findings are as noted.  I have personally seen and evaluated the patient.  I find the patient's history and physical exam are consistent with the NP/PA documentation.  I agree with the care provided, treatment rendered, disposition and follow-up plan.    Injury to the right foot today from a cinderblock that landed on his right great toe at the base there is a superficial abrasion or laceration noted there there is erythema noted there.  He will get local wound care and advised follow-up x-ray pending      Critical Care     James Richard M.D.  Attending Emergency  Physician            James Richard MD  04/14/24 5381    
file.    Allergies:  Bee venom, Duloxetine, Duloxetine hcl, Tall ragweed, and Risperidone and related    Home Medications:  Prior to Admission medications    Medication Sig Start Date End Date Taking? Authorizing Provider   valproic acid (DEPAKENE) 250 MG/5ML SOLN oral solution Take 15 mLs by mouth 2 times daily 2/15/23   Cancilla, Victor Manuel, APRN - CNP   sertraline (ZOLOFT) 20 MG/ML concentrated solution Take 5 mLs by mouth daily 2/16/23   Cancilla, Victor Manuel, APRN - CNP   traZODone (DESYREL) 50 MG tablet Take 1 tablet by mouth nightly as needed for Sleep 2/15/23   Cancilla, Victor Manuel, APRN - CNP   risperiDONE ER (PERSERIS) 120 MG PRSY subcutaneous injection Inject 0.8 mLs into the skin every 30 days 2/15/23   Cancilla, Victor Manuel, APRN - CNP         REVIEW OF SYSTEMS       Review of Systems   Musculoskeletal:         Toe pain right side       PHYSICAL EXAM      INITIAL VITALS:   BP (!) 151/92   Pulse (!) 127   Temp 97.7 °F (36.5 °C) (Oral)   Resp 16   SpO2 100%     Physical Exam  Vitals and nursing note reviewed.   Constitutional:       General: He is not in acute distress.     Appearance: He is normal weight. He is not ill-appearing, toxic-appearing or diaphoretic.   HENT:      Head: Normocephalic and atraumatic.   Musculoskeletal:      Comments: Abrasion to the anterior aspect of patient's right great toe at the base of the neck.  More on the medial aspect.  It is approximately 0.5 cm in length.  There is no laceration.  There is no active bleeding.  He has full range of motion and proprioception to his bilateral lower extremities.  Normal DP and PT pulses bilaterally.  Patient has no swelling to his bilateral ankles and Achilles are normal.  He has normal range of motion of his ankles.  There is no tibial or fibular tenderness bilaterally.  No knee pain bilaterally.   Neurological:      Mental Status: He is alert.      Gait: Gait normal.           DDX/DIAGNOSTIC RESULTS / EMERGENCY DEPARTMENT COURSE / MDM

## 2024-04-14 NOTE — DISCHARGE INSTRUCTIONS
Use antibiotic ointment twice daily for the next 5 days.  Please use soap and water to keep your feet clean.  Please use new socks daily.    If there is redness swelling ankle pain or redness that is tracking up your leg please expect signs of blisters over department.

## 2024-04-14 NOTE — ED NOTES
Writer met with patient regarding concerns related to housing.  Patient identifies issues with a group home staff and reports that he was \"told to leave.\" Patient reports that he is able to return to the group home but states he just doesn't think it will work out long term on account of the group home staff working for [employer]. Patient is in agreement to return to the group home today and to work with his mental health staff to find alternative housing. 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 Writer did speak with [facility] ACT staff who report patient has been fixated lately on war and [employer] concerns. [facility] state patient can return to the group home and they will follow up with him tomorrow regarding his concerns.

## 2024-06-11 ENCOUNTER — HOSPITAL ENCOUNTER (INPATIENT)
Age: 34
LOS: 7 days | Discharge: HOME | DRG: 750 | End: 2024-06-18
Attending: EMERGENCY MEDICINE | Admitting: PSYCHIATRY & NEUROLOGY
Payer: COMMERCIAL

## 2024-06-11 DIAGNOSIS — F23 ACUTE PSYCHOSIS (HCC): Primary | ICD-10-CM

## 2024-06-11 LAB
ALBUMIN SERPL-MCNC: 4.9 G/DL (ref 3.5–5.2)
ALP SERPL-CCNC: 81 U/L (ref 40–129)
ALT SERPL-CCNC: 12 U/L (ref 5–41)
AMPHET UR QL SCN: NEGATIVE
ANION GAP SERPL CALCULATED.3IONS-SCNC: 13 MMOL/L (ref 9–17)
APAP SERPL-MCNC: <5 UG/ML (ref 10–30)
AST SERPL-CCNC: 16 U/L
BARBITURATES UR QL SCN: NEGATIVE
BASOPHILS # BLD: 0.1 K/UL (ref 0–0.2)
BASOPHILS NFR BLD: 1 % (ref 0–2)
BENZODIAZ UR QL: NEGATIVE
BILIRUB SERPL-MCNC: 0.2 MG/DL (ref 0.3–1.2)
BUN SERPL-MCNC: 14 MG/DL (ref 6–20)
CALCIUM SERPL-MCNC: 9.7 MG/DL (ref 8.6–10.4)
CANNABINOIDS UR QL SCN: POSITIVE
CHLORIDE SERPL-SCNC: 105 MMOL/L (ref 98–107)
CO2 SERPL-SCNC: 22 MMOL/L (ref 20–31)
COCAINE UR QL SCN: NEGATIVE
CREAT SERPL-MCNC: 0.9 MG/DL (ref 0.7–1.2)
EOSINOPHIL # BLD: 0.6 K/UL (ref 0–0.4)
EOSINOPHILS RELATIVE PERCENT: 4 % (ref 0–4)
ERYTHROCYTE [DISTWIDTH] IN BLOOD BY AUTOMATED COUNT: 15.1 % (ref 11.5–14.9)
ETHANOL PERCENT: <0.01 %
ETHANOLAMINE SERPL-MCNC: <10 MG/DL (ref 0–0.08)
FENTANYL UR QL: NEGATIVE
GFR, ESTIMATED: >90 ML/MIN/1.73M2
GLUCOSE SERPL-MCNC: 100 MG/DL (ref 70–99)
HCT VFR BLD AUTO: 48.7 % (ref 41–53)
HGB BLD-MCNC: 16.1 G/DL (ref 13.5–17.5)
LYMPHOCYTES NFR BLD: 4.4 K/UL (ref 1–4.8)
LYMPHOCYTES RELATIVE PERCENT: 31 % (ref 24–44)
MAGNESIUM SERPL-MCNC: 2.4 MG/DL (ref 1.6–2.6)
MCH RBC QN AUTO: 31.9 PG (ref 26–34)
MCHC RBC AUTO-ENTMCNC: 33 G/DL (ref 31–37)
MCV RBC AUTO: 96.4 FL (ref 80–100)
METHADONE UR QL: NEGATIVE
MONOCYTES NFR BLD: 1.1 K/UL (ref 0.1–1.3)
MONOCYTES NFR BLD: 8 % (ref 1–7)
NEUTROPHILS NFR BLD: 56 % (ref 36–66)
NEUTS SEG NFR BLD: 8.1 K/UL (ref 1.3–9.1)
OPIATES UR QL SCN: NEGATIVE
OXYCODONE UR QL SCN: NEGATIVE
PCP UR QL SCN: NEGATIVE
PLATELET # BLD AUTO: 379 K/UL (ref 150–450)
PMV BLD AUTO: 6.9 FL (ref 6–12)
POTASSIUM SERPL-SCNC: 5.3 MMOL/L (ref 3.7–5.3)
PROT SERPL-MCNC: 8 G/DL (ref 6.4–8.3)
RBC # BLD AUTO: 5.05 M/UL (ref 4.5–5.9)
SALICYLATES SERPL-MCNC: <1 MG/DL (ref 3–10)
SODIUM SERPL-SCNC: 140 MMOL/L (ref 135–144)
TEST INFORMATION: ABNORMAL
WBC OTHER # BLD: 14.3 K/UL (ref 3.5–11)

## 2024-06-11 PROCEDURE — 80307 DRUG TEST PRSMV CHEM ANLYZR: CPT

## 2024-06-11 PROCEDURE — 85025 COMPLETE CBC W/AUTO DIFF WBC: CPT

## 2024-06-11 PROCEDURE — 1240000000 HC EMOTIONAL WELLNESS R&B

## 2024-06-11 PROCEDURE — 80179 DRUG ASSAY SALICYLATE: CPT

## 2024-06-11 PROCEDURE — 83735 ASSAY OF MAGNESIUM: CPT

## 2024-06-11 PROCEDURE — G0480 DRUG TEST DEF 1-7 CLASSES: HCPCS

## 2024-06-11 PROCEDURE — 99285 EMERGENCY DEPT VISIT HI MDM: CPT

## 2024-06-11 PROCEDURE — 80143 DRUG ASSAY ACETAMINOPHEN: CPT

## 2024-06-11 PROCEDURE — 36415 COLL VENOUS BLD VENIPUNCTURE: CPT

## 2024-06-11 PROCEDURE — 83036 HEMOGLOBIN GLYCOSYLATED A1C: CPT

## 2024-06-11 PROCEDURE — 80053 COMPREHEN METABOLIC PANEL: CPT

## 2024-06-11 PROCEDURE — 6370000000 HC RX 637 (ALT 250 FOR IP): Performed by: PSYCHIATRY & NEUROLOGY

## 2024-06-11 RX ORDER — HYDROXYZINE 50 MG/1
50 TABLET, FILM COATED ORAL 3 TIMES DAILY PRN
Status: DISCONTINUED | OUTPATIENT
Start: 2024-06-11 | End: 2024-06-18 | Stop reason: HOSPADM

## 2024-06-11 RX ORDER — HYDROXYZINE HCL 10 MG/5 ML
50 SOLUTION, ORAL ORAL 2 TIMES DAILY
COMMUNITY

## 2024-06-11 RX ORDER — VALPROIC ACID 250 MG/5ML
500 SOLUTION ORAL DAILY
COMMUNITY

## 2024-06-11 RX ORDER — POLYETHYLENE GLYCOL 3350 17 G/17G
17 POWDER, FOR SOLUTION ORAL DAILY PRN
Status: DISCONTINUED | OUTPATIENT
Start: 2024-06-11 | End: 2024-06-18 | Stop reason: HOSPADM

## 2024-06-11 RX ORDER — MAGNESIUM HYDROXIDE/ALUMINUM HYDROXICE/SIMETHICONE 120; 1200; 1200 MG/30ML; MG/30ML; MG/30ML
30 SUSPENSION ORAL EVERY 6 HOURS PRN
Status: DISCONTINUED | OUTPATIENT
Start: 2024-06-11 | End: 2024-06-18 | Stop reason: HOSPADM

## 2024-06-11 RX ORDER — SERTRALINE HYDROCHLORIDE 20 MG/ML
150 SOLUTION ORAL DAILY
COMMUNITY

## 2024-06-11 RX ORDER — VIT C/E/ZN/COPPR/LUTEIN/ZEAXAN 250 MG-2MG
1 CAPSULE ORAL DAILY
COMMUNITY

## 2024-06-11 RX ORDER — ACETAMINOPHEN 325 MG/1
650 TABLET ORAL EVERY 6 HOURS PRN
Status: DISCONTINUED | OUTPATIENT
Start: 2024-06-11 | End: 2024-06-18 | Stop reason: HOSPADM

## 2024-06-11 RX ORDER — ARIPIPRAZOLE ORAL 1 MG/ML
15 SOLUTION ORAL DAILY
Status: ON HOLD | COMMUNITY
End: 2024-06-18 | Stop reason: HOSPADM

## 2024-06-11 RX ORDER — CLONIDINE HYDROCHLORIDE 0.1 MG/1
0.1 TABLET ORAL 2 TIMES DAILY
COMMUNITY

## 2024-06-11 RX ORDER — IBUPROFEN 400 MG/1
400 TABLET ORAL EVERY 6 HOURS PRN
Status: DISCONTINUED | OUTPATIENT
Start: 2024-06-11 | End: 2024-06-18 | Stop reason: HOSPADM

## 2024-06-11 RX ORDER — VALPROIC ACID 250 MG/5ML
1000 SOLUTION ORAL NIGHTLY
COMMUNITY

## 2024-06-11 RX ORDER — TRAZODONE HYDROCHLORIDE 50 MG/1
50 TABLET ORAL NIGHTLY PRN
Status: DISCONTINUED | OUTPATIENT
Start: 2024-06-11 | End: 2024-06-18 | Stop reason: HOSPADM

## 2024-06-11 RX ADMIN — NICOTINE POLACRILEX 4 MG: 4 LOZENGE ORAL at 19:48

## 2024-06-11 RX ADMIN — ACETAMINOPHEN 650 MG: 325 TABLET ORAL at 19:46

## 2024-06-11 RX ADMIN — HYDROXYZINE HYDROCHLORIDE 50 MG: 50 TABLET, FILM COATED ORAL at 19:45

## 2024-06-11 ASSESSMENT — LIFESTYLE VARIABLES
HOW MANY STANDARD DRINKS CONTAINING ALCOHOL DO YOU HAVE ON A TYPICAL DAY: PATIENT DOES NOT DRINK
HOW OFTEN DO YOU HAVE A DRINK CONTAINING ALCOHOL: NEVER
HOW OFTEN DO YOU HAVE A DRINK CONTAINING ALCOHOL: NEVER

## 2024-06-11 ASSESSMENT — PAIN DESCRIPTION - ORIENTATION: ORIENTATION: MID;UPPER

## 2024-06-11 ASSESSMENT — PATIENT HEALTH QUESTIONNAIRE - PHQ9
SUM OF ALL RESPONSES TO PHQ QUESTIONS 1-9: 0
SUM OF ALL RESPONSES TO PHQ QUESTIONS 1-9: 0
SUM OF ALL RESPONSES TO PHQ9 QUESTIONS 1 & 2: 0
SUM OF ALL RESPONSES TO PHQ QUESTIONS 1-9: 0
SUM OF ALL RESPONSES TO PHQ QUESTIONS 1-9: 0
1. LITTLE INTEREST OR PLEASURE IN DOING THINGS: NOT AT ALL
2. FEELING DOWN, DEPRESSED OR HOPELESS: NOT AT ALL

## 2024-06-11 ASSESSMENT — PAIN - FUNCTIONAL ASSESSMENT: PAIN_FUNCTIONAL_ASSESSMENT: NONE - DENIES PAIN

## 2024-06-11 ASSESSMENT — SLEEP AND FATIGUE QUESTIONNAIRES
DO YOU HAVE DIFFICULTY SLEEPING: NO
AVERAGE NUMBER OF SLEEP HOURS: 7
DO YOU USE A SLEEP AID: NO

## 2024-06-11 ASSESSMENT — PAIN DESCRIPTION - LOCATION: LOCATION: BACK

## 2024-06-11 ASSESSMENT — PAIN SCALES - GENERAL: PAINLEVEL_OUTOF10: 7

## 2024-06-11 ASSESSMENT — PAIN DESCRIPTION - DESCRIPTORS: DESCRIPTORS: ACHING

## 2024-06-11 NOTE — ED PROVIDER NOTES
EMERGENCY DEPARTMENT ENCOUNTER    Pt Name: Anthony Alan  MRN: [id number]  Birthdate 1990  Date of evaluation: 6/11/24  CHIEF COMPLAINT       Chief Complaint   Patient presents with    Mental Health Problem     HISTORY OF PRESENT ILLNESS   33-year-old male presents for mental health evaluation.  Patient was brought in after he was seen at the group [place], patient reportedly has not been taking his meds.  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  He denies any suicidal or homicidal ideation he denies any visual auditory hallucinations admits to marijuana use denies any other drug or alcohol use at this time.  He denies any medical complaints currently    The history is provided by the patient.           REVIEW OF SYSTEMS     Review of Systems   Constitutional:  Negative for chills and fever.   HENT:  Negative for congestion and ear pain.    Eyes:  Negative for pain.   Respiratory:  Negative for shortness of breath.    Cardiovascular:  Negative for chest pain, palpitations and leg swelling.   Gastrointestinal:  Negative for abdominal pain.   Genitourinary:  Negative for dysuria and flank pain.   Musculoskeletal:  Negative for back pain.   Skin:  Negative for color change.   Neurological:  Negative for numbness and headaches.   Psychiatric/Behavioral:  Negative for confusion.    All other systems reviewed and are negative.    PASTMEDICAL HISTORY     Past Medical History:   Diagnosis Date    Hypertension     Schizophrenia, schizo-affective ([facility])      Past Problem List  Patient Active Problem List   Diagnosis Code    Schizophrenia ([facility]) F20.9    Cannabis dependence ([facility]) F12.20    Chest pain R07.9    Acute psychosis ([facility]) F23

## 2024-06-11 NOTE — BH NOTE
Patient given tobacco quitline number 17731482489 at this time, refusing to call at this time, states \" I just dont want to quit now\"- patient given information as to the dangers of long term tobacco use. Continue to reinforce the importance of tobacco cessation.

## 2024-06-11 NOTE — ED NOTES
Provisional Diagnosis:   Acute psychosis     Psychosocial and Contextual Factors:   Patient brought in on application for emergency admission by the [facility] due to patients paranoid behaviors and reportedly cutting electrical lines inside the group home.     C-SSRS Summary:      Patient: X  Family:   Agency: [facility]    Substance Abuse: Patient denies.    Present Suicidal Behavior:  Patient denies.    Verbal:     Attempt:    Past Suicidal Behavior: Patient denies.    Verbal:    Attempt:      Self-Injurious/Self-Mutilation: Patient denies      Violence Current or Past: Patient is calm and cooperative while in the ED.       Protective Factors:    Patient has legal guardian.      Clinical Summary:    Anthony Alan is a 33 y.o. male who presents to the ED on an application for emergency admission by [facility] ACT team.    Per application for emergency admission \"Anthony has not been taking mediations, presents with delusions beleiving himself to be apart of the [employer], related to Princess Hirsch, demonstrates present dangerousness as evidence by cutting in to various wires and circuitry within the group home due to delusions. Anthony has also not been eating for at least the last four days per group home . Writer was unable  to substatiate if this is due to delusions given symptomlogy it appears likely.  Client also believes that people are out to kill him, was also unable to provide the potential threat as increasingly verbally aggressive towards staff.  Anthony also blocked staff from getting access to another client to bring another resident of group home to post hospital discharge appointment as client believes this is my house not the group home.  Client represents risk to himself and others, is in need of inpatient stabilization to be back on medication.\"    Upon arrival patient is calm and cooperative.. Per Ze When they were in the house they were attempting to see another client of theirs, and patient refused

## 2024-06-12 PROBLEM — F12.90 CANNABIS USE DISORDER: Status: ACTIVE | Noted: 2024-06-12

## 2024-06-12 PROCEDURE — 1240000000 HC EMOTIONAL WELLNESS R&B

## 2024-06-12 PROCEDURE — 6370000000 HC RX 637 (ALT 250 FOR IP): Performed by: NURSE PRACTITIONER

## 2024-06-12 PROCEDURE — 99222 1ST HOSP IP/OBS MODERATE 55: CPT | Performed by: PSYCHIATRY & NEUROLOGY

## 2024-06-12 PROCEDURE — APPSS60 APP SPLIT SHARED TIME 46-60 MINUTES: Performed by: NURSE PRACTITIONER

## 2024-06-12 PROCEDURE — 6370000000 HC RX 637 (ALT 250 FOR IP): Performed by: PSYCHIATRY & NEUROLOGY

## 2024-06-12 RX ORDER — VALPROIC ACID 250 MG/5ML
1000 SOLUTION ORAL NIGHTLY
Status: DISCONTINUED | OUTPATIENT
Start: 2024-06-12 | End: 2024-06-18 | Stop reason: HOSPADM

## 2024-06-12 RX ORDER — VIT C/E/ZN/COPPR/LUTEIN/ZEAXAN 250 MG-2MG
1 CAPSULE ORAL DAILY
Status: DISCONTINUED | OUTPATIENT
Start: 2024-06-12 | End: 2024-06-12 | Stop reason: CLARIF

## 2024-06-12 RX ORDER — VALPROIC ACID 250 MG/5ML
500 SOLUTION ORAL DAILY
Status: DISCONTINUED | OUTPATIENT
Start: 2024-06-12 | End: 2024-06-18 | Stop reason: HOSPADM

## 2024-06-12 RX ORDER — ARIPIPRAZOLE ORAL 1 MG/ML
15 SOLUTION ORAL DAILY
Status: DISCONTINUED | OUTPATIENT
Start: 2024-06-12 | End: 2024-06-14

## 2024-06-12 RX ORDER — SERTRALINE HYDROCHLORIDE 20 MG/ML
150 SOLUTION ORAL DAILY
Status: DISCONTINUED | OUTPATIENT
Start: 2024-06-12 | End: 2024-06-18 | Stop reason: HOSPADM

## 2024-06-12 RX ORDER — CLONIDINE HYDROCHLORIDE 0.1 MG/1
0.1 TABLET ORAL 2 TIMES DAILY
Status: DISCONTINUED | OUTPATIENT
Start: 2024-06-12 | End: 2024-06-18 | Stop reason: HOSPADM

## 2024-06-12 RX ADMIN — ARIPIPRAZOLE 15 MG: 1 SOLUTION ORAL at 17:25

## 2024-06-12 RX ADMIN — CLONIDINE HYDROCHLORIDE 0.1 MG: 0.1 TABLET ORAL at 15:00

## 2024-06-12 RX ADMIN — HYDROXYZINE HYDROCHLORIDE 50 MG: 50 TABLET, FILM COATED ORAL at 11:49

## 2024-06-12 RX ADMIN — VALPROIC ACID 500 MG: 250 SOLUTION ORAL at 17:26

## 2024-06-12 RX ADMIN — NICOTINE POLACRILEX 4 MG: 4 LOZENGE ORAL at 20:21

## 2024-06-12 RX ADMIN — CLONIDINE HYDROCHLORIDE 0.1 MG: 0.1 TABLET ORAL at 20:20

## 2024-06-12 RX ADMIN — NICOTINE POLACRILEX 4 MG: 4 LOZENGE ORAL at 11:49

## 2024-06-12 RX ADMIN — SERTRALINE HYDROCHLORIDE 150 MG: 20 SOLUTION, CONCENTRATE ORAL at 17:25

## 2024-06-12 RX ADMIN — NICOTINE POLACRILEX 4 MG: 4 LOZENGE ORAL at 07:32

## 2024-06-12 NOTE — BH NOTE
[facility]  Admission Note     Admission Type:   Admission Type: Involuntary    Reason for admission:  Reason for Admission: Patient was brought to [facility] by poilce due to cutting electrical lines in his group home. Patient was verbally agressive towards police. Patient has controlled delusions.      Addictive Behavior:   Addictive Behavior  In the Past 3 Months, Have You Felt or Has Someone Told You That You Have a Problem With  : None    Medical Problems:   Past Medical History:   Diagnosis Date    Hypertension     Schizophrenia, schizo-affective ([facility])        Status EXAM:  Mental Status and Behavioral Exam  Normal: No  Level of Assistance: Independent/Self  Facial Expression: Avoids Gaze, Flat, Sad  Affect: Blunt  Level of Consciousness: Alert  Frequency of Checks: 4 times per hour, close  Mood:Normal: No  Mood: Depressed, Anxious, Helpless, Sad  Motor Activity:Normal: Yes  Eye Contact: Poor  Observed Behavior: Preoccupied, Withdrawn, Guarded  Sexual Misconduct History: Current - no  Preception: Center to person, Center to time, Center to place, Center to situation  Attention:Normal: No  Attention: Distractible  Thought Processes: Loose association  Thought Content:Normal: No  Thought Content: Delusions, Preoccupations  Depression Symptoms: Feelings of helplessness, Feelings of hopelessess, Isolative, Loss of interest  Anxiety Symptoms: Generalized  Deana Symptoms: No problems reported or observed.  Hallucinations: None  Delusions: Yes  Delusions: Controlled  Memory:Normal: No  Memory: Poor recent, Poor remote  Insight and Judgment: No  Insight and Judgment: Poor judgment, Poor insight    Tobacco Screening:  Practical Counseling, on admission, taryn X, if applicable and completed (first 3 are required if patient doesn't refuse):            ( ) Recognizing danger situations (included triggers and roadblocks)                    ( ) Coping skills (new ways to manage stress,relaxation techniques,

## 2024-06-12 NOTE — GROUP NOTE
Group Therapy Note    Date: 6/12/2024    Group Start Time: 1015  Group End Time: 1045  Group Topic: Psychoeducation    Monty Rm        Group Therapy Note    Attendees: 5/13   Patient declined to attend psychotherapy group after encouragement from staff.  1:1 talk time offered but refused.       Signature:  Monty Wilder

## 2024-06-12 NOTE — GROUP NOTE
Group Therapy Note    Date: 6/12/2024    Group Start Time: 0900  Group End Time: 0915  Group Topic: Community Meeting    KEYANNA ALLAN D    Denisa Montgomery      Community Meeting Group Note        Date: 6/12/2024 Start Time: 9am  End Time:  0915      Number of Participants in Group & Unit Census:  3/14      Goal of Group:To discuss daily goal       Comments:     Patient did not participate in Community Meeting group, despite staff encouragement and explanation of benefits.  Patient remain seclusive to self.  Q15 minute safety checks maintained for patient safety and will continue to encourage patient to attend unit programming.

## 2024-06-12 NOTE — GROUP NOTE
Group Therapy Note    Date: 6/12/2024    Group Start Time: 1330  Group End Time: 1410  Group Topic: Cognitive Skills    Joycelyn Flores CTRS    Cognitive Skills Group Note        Date: June 12, 2024 Start Time: 1:30pm  End Time:  210 pm,      Number of Participants in Group & Unit Census:  7/13    Topic: cognitive skills     Goal of Group:pt will demonstrate improved communication skills and improved coping skills      Comments:     Patient did not participate in Cognitive Skills group, despite staff encouragement and explanation of benefits.  Patient remain seclusive to self.  Q15 minute safety checks maintained for patient safety and will continue to encourage patient to attend unit programming.              Signature:  MELIDA ROBISON

## 2024-06-12 NOTE — GROUP NOTE
Group Therapy Note    Date: 6/12/2024    Group Start Time: 1100  Group End Time: 1130  Group Topic: Cognitive Skills    Joycelyn Flores CTRS    Relaxation Group Note        Date: June 12, 2024 Start Time: 11am  End Time: 11:30am      Number of Participants in Group & Unit Census:  7/14    Topic: relaxation group     Goal of Group:pt will demonstrate improved coping skills and pt will identify benefits of using art for relaxation       Comments:     Patient did not participate in Relaxation group, despite staff encouragement and explanation of benefits.  Patient remain seclusive to self.  Q15 minute safety checks maintained for patient safety and will continue to encourage patient to attend unit programming.              Signature:  MELIDA ROBISON

## 2024-06-13 LAB
CHOLEST SERPL-MCNC: 136 MG/DL
CHOLESTEROL/HDL RATIO: 3.9
EST. AVERAGE GLUCOSE BLD GHB EST-MCNC: 105 MG/DL
HBA1C MFR BLD: 5.3 % (ref 4–6)
HDLC SERPL-MCNC: 35 MG/DL
LDLC SERPL CALC-MCNC: 87 MG/DL (ref 0–130)
TRIGL SERPL-MCNC: 70 MG/DL

## 2024-06-13 PROCEDURE — 6370000000 HC RX 637 (ALT 250 FOR IP): Performed by: PSYCHIATRY & NEUROLOGY

## 2024-06-13 PROCEDURE — 6370000000 HC RX 637 (ALT 250 FOR IP): Performed by: NURSE PRACTITIONER

## 2024-06-13 PROCEDURE — 81003 URINALYSIS AUTO W/O SCOPE: CPT

## 2024-06-13 PROCEDURE — 99222 1ST HOSP IP/OBS MODERATE 55: CPT | Performed by: INTERNAL MEDICINE

## 2024-06-13 PROCEDURE — 80061 LIPID PANEL: CPT

## 2024-06-13 PROCEDURE — APPSS30 APP SPLIT SHARED TIME 16-30 MINUTES: Performed by: NURSE PRACTITIONER

## 2024-06-13 PROCEDURE — 99232 SBSQ HOSP IP/OBS MODERATE 35: CPT | Performed by: PSYCHIATRY & NEUROLOGY

## 2024-06-13 PROCEDURE — 36415 COLL VENOUS BLD VENIPUNCTURE: CPT

## 2024-06-13 PROCEDURE — 1240000000 HC EMOTIONAL WELLNESS R&B

## 2024-06-13 RX ADMIN — VALPROIC ACID 1000 MG: 250 SOLUTION ORAL at 20:43

## 2024-06-13 RX ADMIN — NICOTINE POLACRILEX 4 MG: 4 LOZENGE ORAL at 20:44

## 2024-06-13 RX ADMIN — CLONIDINE HYDROCHLORIDE 0.1 MG: 0.1 TABLET ORAL at 08:34

## 2024-06-13 RX ADMIN — NICOTINE POLACRILEX 4 MG: 4 LOZENGE ORAL at 17:45

## 2024-06-13 RX ADMIN — NICOTINE POLACRILEX 4 MG: 4 LOZENGE ORAL at 06:22

## 2024-06-13 RX ADMIN — NICOTINE POLACRILEX 4 MG: 4 LOZENGE ORAL at 15:49

## 2024-06-13 RX ADMIN — HYDROXYZINE HYDROCHLORIDE 50 MG: 50 TABLET, FILM COATED ORAL at 20:44

## 2024-06-13 RX ADMIN — VALPROIC ACID 500 MG: 250 SOLUTION ORAL at 08:37

## 2024-06-13 RX ADMIN — SERTRALINE HYDROCHLORIDE 150 MG: 20 SOLUTION, CONCENTRATE ORAL at 08:33

## 2024-06-13 RX ADMIN — CLONIDINE HYDROCHLORIDE 0.1 MG: 0.1 TABLET ORAL at 20:44

## 2024-06-13 RX ADMIN — ARIPIPRAZOLE 15 MG: 1 SOLUTION ORAL at 08:35

## 2024-06-13 NOTE — GROUP NOTE
Group Therapy Note    Date: 6/13/2024    Group Start Time: 1050  Group End Time: 1125  Group Topic: Cognitive Skills    Joycelyn Flores CTRS    Cognitive Skills Group Note        Date: June 13, 2024 Start Time:  1050 am   End Time:  1125 am       Number of Participants in Group & Unit Census:   5/12    Topic: cognitive skills     Goal of Group: pt will demonstrate improved communication and improved coping skills       Comments:     Patient did not participate in Cognitive Skills group, despite staff encouragement and explanation of benefits.  Patient remain seclusive to self.  Q15 minute safety checks maintained for patient safety and will continue to encourage patient to attend unit programming.            Signature:  MELIDA ROBISON

## 2024-06-13 NOTE — GROUP NOTE
Group Therapy Note    Date: 6/13/2024    Group Start Time: 1000  Group End Time: 1015  Group Topic: Psychotherapy    Monty mR        Group Therapy Note    Attendees: 3/12     Patient declined to attend psychotherapy group after encouragement from staff.  1:1 talk time offered but refused.   Signature:  Monty Wilder

## 2024-06-13 NOTE — GROUP NOTE
Group Therapy Note    Date: 6/13/2024    Group Start Time: 1330  Group End Time: 1415  Group Topic: Recreational    [facility] Joycelyn Sullivan CTRS    Recreation Group Note        Date: June 13, 2024 Start Time: 1:30pm  End Time:  215pm      Number of Participants in Group & Unit Census:  5/1-    Topic: recreation group     Goal of Group: pt will demonstrate improved coping skills and improved leisure awareness       Comments:     Patient did not participate in Recreation group, despite staff encouragement and explanation of benefits.  Patient remain seclusive to self.  Q15 minute safety checks maintained for patient safety and will continue to encourage patient to attend unit programming.            Signature:  MELIDA ROBISON

## 2024-06-14 LAB
BILIRUB UR QL STRIP: NEGATIVE
CLARITY UR: CLEAR
COLOR UR: YELLOW
COMMENT: ABNORMAL
GLUCOSE UR STRIP-MCNC: NEGATIVE MG/DL
HGB UR QL STRIP.AUTO: NEGATIVE
KETONES UR STRIP-MCNC: ABNORMAL MG/DL
LEUKOCYTE ESTERASE UR QL STRIP: NEGATIVE
NITRITE UR QL STRIP: NEGATIVE
PH UR STRIP: 8 [PH] (ref 5–8)
PROT UR STRIP-MCNC: NEGATIVE MG/DL
SP GR UR STRIP: 1.02 (ref 1–1.03)
UROBILINOGEN UR STRIP-ACNC: NORMAL EU/DL (ref 0–1)

## 2024-06-14 PROCEDURE — 1240000000 HC EMOTIONAL WELLNESS R&B

## 2024-06-14 PROCEDURE — APPSS30 APP SPLIT SHARED TIME 16-30 MINUTES: Performed by: NURSE PRACTITIONER

## 2024-06-14 PROCEDURE — 6370000000 HC RX 637 (ALT 250 FOR IP): Performed by: NURSE PRACTITIONER

## 2024-06-14 PROCEDURE — 6370000000 HC RX 637 (ALT 250 FOR IP): Performed by: PSYCHIATRY & NEUROLOGY

## 2024-06-14 PROCEDURE — 99232 SBSQ HOSP IP/OBS MODERATE 35: CPT | Performed by: PSYCHIATRY & NEUROLOGY

## 2024-06-14 RX ORDER — ARIPIPRAZOLE ORAL 1 MG/ML
20 SOLUTION ORAL DAILY
Status: DISCONTINUED | OUTPATIENT
Start: 2024-06-15 | End: 2024-06-18 | Stop reason: HOSPADM

## 2024-06-14 RX ORDER — VITS A,C,E/LUTEIN/MINERALS 300MCG-200
1 TABLET ORAL DAILY
Status: DISCONTINUED | OUTPATIENT
Start: 2024-06-14 | End: 2024-06-18 | Stop reason: HOSPADM

## 2024-06-14 RX ADMIN — NICOTINE POLACRILEX 4 MG: 4 LOZENGE ORAL at 06:53

## 2024-06-14 RX ADMIN — SERTRALINE HYDROCHLORIDE 150 MG: 20 SOLUTION, CONCENTRATE ORAL at 08:36

## 2024-06-14 RX ADMIN — CLONIDINE HYDROCHLORIDE 0.1 MG: 0.1 TABLET ORAL at 20:55

## 2024-06-14 RX ADMIN — VALPROIC ACID 500 MG: 250 SOLUTION ORAL at 08:36

## 2024-06-14 RX ADMIN — HYDROXYZINE HYDROCHLORIDE 50 MG: 50 TABLET, FILM COATED ORAL at 20:55

## 2024-06-14 RX ADMIN — I-VITE, TAB 1000-60-2MG (60/BT) 1 TABLET: TAB at 14:35

## 2024-06-14 RX ADMIN — NICOTINE POLACRILEX 4 MG: 4 LOZENGE ORAL at 19:23

## 2024-06-14 RX ADMIN — CLONIDINE HYDROCHLORIDE 0.1 MG: 0.1 TABLET ORAL at 08:35

## 2024-06-14 RX ADMIN — VALPROIC ACID 1000 MG: 250 SOLUTION ORAL at 20:56

## 2024-06-14 RX ADMIN — ARIPIPRAZOLE 15 MG: 1 SOLUTION ORAL at 08:36

## 2024-06-14 NOTE — GROUP NOTE
Group Therapy Note    Date: 6/14/2024    Group Start Time: 1330  Group End Time: 1410  Group Topic: Cognitive Skills    Ebony Bustillo        Group Therapy Note    Attendees: 6/9       Coping Skills  Group Note        Date: June 14, 2024 Start Time:  1330   End Time:  1410      Number of Participants in Group & Unit Census:  6/9    Topic: Coping Skills    Goal of Group: List positive coping skills      Comments:     Patient did not participate in  Coping Skills  group, despite staff encouragement and explanation of benefits.  Patient remain seclusive to self.  Q15 minute safety checks maintained for patient safety and will continue to encourage patient to attend unit programming.

## 2024-06-14 NOTE — GROUP NOTE
Group Therapy Note    Date: 6/14/2024    Group Start Time: 1505  Group End Time: 1550  Group Topic: Cognitive Skills    Jesenia Bay CTRS        Group Therapy Note    Attendees: 6/10     Topic: To increase socialization , practice decision making, and concentration        Patient did not participate in Cognitive Skills Group at 1505, despite staff invitation and explanation of benefits.   Pt was seclusive to self and room during group        Q15 minute safety checks maintained for patient safety and will continue to encourage   patient to attend unit programming.       Discipline Responsible: Psychoeducational Specialist  Signature:  MELIDA GASTON

## 2024-06-14 NOTE — GROUP NOTE
Group Therapy Note    Date: 6/14/2024    Group Start Time: 1015  Group End Time: 1115  Group Topic: Cognitive Skills    Jesenia Bay CTRS        Group Therapy Note    Attendees: 6/10       Topic: To increase socialization , practice decision making skills, and concentration        Patient did not participate in Cognitive Skills Group at 1015, despite staff invitation and explanation of benefits.   Pt was seclusive to self and room during group        Q15 minute safety checks maintained for patient safety and will continue to encourage   patient to attend unit programming.       Discipline Responsible: Psychoeducational Specialist  Signature:  MELIDA GASTON

## 2024-06-14 NOTE — GROUP NOTE
Group Therapy Note    Date: 6/14/2024    Group Start Time: 0915  Group End Time: 0945  Group Topic: Community Meeting    Ebony Bustillo        Group Therapy Note    Attendees: 5/10     Community Meeting Group Note        Date: June 14, 2024 Start Time:  0915   End Time:  0945      Number of Participants in Group & Unit Census:  5/10.    Topic:  Goal setting    Goal of Group: Set short term goal for the day      Comments:     Patient did not participate in Community Meeting group, despite staff encouragement and explanation of benefits.  Patient remain seclusive to self.  Q15 minute safety checks maintained for patient safety and will continue to encourage patient to attend unit programming.

## 2024-06-15 PROCEDURE — 99232 SBSQ HOSP IP/OBS MODERATE 35: CPT | Performed by: PSYCHIATRY & NEUROLOGY

## 2024-06-15 PROCEDURE — 6370000000 HC RX 637 (ALT 250 FOR IP): Performed by: NURSE PRACTITIONER

## 2024-06-15 PROCEDURE — 6370000000 HC RX 637 (ALT 250 FOR IP): Performed by: PSYCHIATRY & NEUROLOGY

## 2024-06-15 PROCEDURE — 1240000000 HC EMOTIONAL WELLNESS R&B

## 2024-06-15 PROCEDURE — APPSS30 APP SPLIT SHARED TIME 16-30 MINUTES: Performed by: NURSE PRACTITIONER

## 2024-06-15 RX ADMIN — SERTRALINE HYDROCHLORIDE 150 MG: 20 SOLUTION, CONCENTRATE ORAL at 08:43

## 2024-06-15 RX ADMIN — NICOTINE POLACRILEX 4 MG: 4 LOZENGE ORAL at 06:16

## 2024-06-15 RX ADMIN — I-VITE, TAB 1000-60-2MG (60/BT) 1 TABLET: TAB at 08:43

## 2024-06-15 RX ADMIN — ARIPIPRAZOLE 20 MG: 1 SOLUTION ORAL at 11:12

## 2024-06-15 RX ADMIN — VALPROIC ACID 500 MG: 250 SOLUTION ORAL at 08:43

## 2024-06-15 RX ADMIN — NICOTINE POLACRILEX 4 MG: 4 LOZENGE ORAL at 15:00

## 2024-06-15 RX ADMIN — NICOTINE POLACRILEX 4 MG: 4 LOZENGE ORAL at 16:17

## 2024-06-15 RX ADMIN — CLONIDINE HYDROCHLORIDE 0.1 MG: 0.1 TABLET ORAL at 21:14

## 2024-06-15 RX ADMIN — VALPROIC ACID 1000 MG: 250 SOLUTION ORAL at 21:15

## 2024-06-15 RX ADMIN — HYDROXYZINE HYDROCHLORIDE 50 MG: 50 TABLET, FILM COATED ORAL at 21:14

## 2024-06-15 RX ADMIN — CLONIDINE HYDROCHLORIDE 0.1 MG: 0.1 TABLET ORAL at 08:43

## 2024-06-15 NOTE — GROUP NOTE
Group Therapy Note    Date: 6/15/2024    Group Start Time: 1000  Group End Time: 1050  Group Topic: Cognitive Skills    Joycelyn Flores CTRS    Cognitive Skills Group Note        Date: Stefanie 15, 2023 Start Time: 10am  End Time:  1050 am      Number of Participants in Group & Unit Census:  2/12    Topic: cognitive skills group     Goal of Group: pt will demonstrate improved coping skills and improved interpersonal relationships       Comments:     Patient did not participate in Cognitive Skills group, despite staff encouragement and explanation of benefits.  Patient remain seclusive to self.  Q15 minute safety checks maintained for patient safety and will continue to encourage patient to attend unit programming.              Signature:  MELIDA ROBISON

## 2024-06-15 NOTE — GROUP NOTE
Group Therapy Note    Date: 6/14/2024    Group Start Time: 2000  Group End Time: 2035  Group Topic: Wrap-Up    [facility] Mercedes Blackmon, RN    Group Therapy Note    Attendees: 6/10       Pt refused to attend Wrap-up/Goal Review group this evening after encouragement from staff.  1:1 talk time offered as alternative to group session, but pt declined.  Will continue to encourage patient to attend unit group programming.        Signature:  Mercedes Butterfield, RN

## 2024-06-16 PROCEDURE — 6370000000 HC RX 637 (ALT 250 FOR IP): Performed by: PSYCHIATRY & NEUROLOGY

## 2024-06-16 PROCEDURE — 99232 SBSQ HOSP IP/OBS MODERATE 35: CPT | Performed by: PSYCHIATRY & NEUROLOGY

## 2024-06-16 PROCEDURE — 6370000000 HC RX 637 (ALT 250 FOR IP): Performed by: NURSE PRACTITIONER

## 2024-06-16 PROCEDURE — APPSS30 APP SPLIT SHARED TIME 16-30 MINUTES: Performed by: NURSE PRACTITIONER

## 2024-06-16 PROCEDURE — 1240000000 HC EMOTIONAL WELLNESS R&B

## 2024-06-16 RX ORDER — CYCLOBENZAPRINE HCL 10 MG
10 TABLET ORAL 3 TIMES DAILY PRN
Status: DISCONTINUED | OUTPATIENT
Start: 2024-06-16 | End: 2024-06-18 | Stop reason: HOSPADM

## 2024-06-16 RX ADMIN — NICOTINE POLACRILEX 4 MG: 4 LOZENGE ORAL at 09:23

## 2024-06-16 RX ADMIN — CLONIDINE HYDROCHLORIDE 0.1 MG: 0.1 TABLET ORAL at 09:20

## 2024-06-16 RX ADMIN — ARIPIPRAZOLE 20 MG: 1 SOLUTION ORAL at 09:21

## 2024-06-16 RX ADMIN — SERTRALINE HYDROCHLORIDE 150 MG: 20 SOLUTION, CONCENTRATE ORAL at 09:20

## 2024-06-16 RX ADMIN — NICOTINE POLACRILEX 4 MG: 4 LOZENGE ORAL at 14:51

## 2024-06-16 RX ADMIN — VALPROIC ACID 1000 MG: 250 SOLUTION ORAL at 21:14

## 2024-06-16 RX ADMIN — I-VITE, TAB 1000-60-2MG (60/BT) 1 TABLET: TAB at 09:20

## 2024-06-16 RX ADMIN — VALPROIC ACID 500 MG: 250 SOLUTION ORAL at 09:20

## 2024-06-16 RX ADMIN — NICOTINE POLACRILEX 4 MG: 4 LOZENGE ORAL at 06:44

## 2024-06-16 RX ADMIN — IBUPROFEN 400 MG: 400 TABLET, FILM COATED ORAL at 09:23

## 2024-06-16 RX ADMIN — CYCLOBENZAPRINE 10 MG: 10 TABLET, FILM COATED ORAL at 14:51

## 2024-06-16 RX ADMIN — NICOTINE POLACRILEX 4 MG: 4 LOZENGE ORAL at 19:40

## 2024-06-16 ASSESSMENT — PAIN SCALES - GENERAL: PAINLEVEL_OUTOF10: 6

## 2024-06-16 NOTE — GROUP NOTE
Group Therapy Note    Date: 6/16/2024    Group Start Time: 1310  Group End Time: 1410  Group Topic: Cognitive Skills    Joycelyn Flores CTRS      Cognitive Skills Group Note        Date: June 16, 2024 Start Time:  110 pm    End Time:  210 pm       Number of Participants in Group & Unit Census:  4/14    Topic: cognitive skills     Goal of Group: pt will demonstrate improved coping skills and improved decision  making skills       Comments:     Patient did not participate in Cognitive Skills group, despite staff encouragement and explanation of benefits.  Patient remain seclusive to self.  Q15 minute safety checks maintained for patient safety and will continue to encourage patient to attend unit programming.              Signature:  MELIDA ROBISON

## 2024-06-16 NOTE — GROUP NOTE
Group Therapy Note    Date: 6/16/2024    Group Start Time: 1000  Group End Time: 1050  Group Topic: Psychotherapy    [facility] Jennifer Bartholomew MSW        Group Therapy Note    Attendees: 8/15     Patient was offered group therapy today but declined to participate despite encouragement from staff.  1:1 was offered.    Discipline Responsible: /Counselor      Signature:  CAITLYN Guevara

## 2024-06-17 PROCEDURE — 99232 SBSQ HOSP IP/OBS MODERATE 35: CPT | Performed by: PSYCHIATRY & NEUROLOGY

## 2024-06-17 PROCEDURE — 6370000000 HC RX 637 (ALT 250 FOR IP): Performed by: NURSE PRACTITIONER

## 2024-06-17 PROCEDURE — 6370000000 HC RX 637 (ALT 250 FOR IP): Performed by: PSYCHIATRY & NEUROLOGY

## 2024-06-17 PROCEDURE — APPSS30 APP SPLIT SHARED TIME 16-30 MINUTES: Performed by: NURSE PRACTITIONER

## 2024-06-17 PROCEDURE — 1240000000 HC EMOTIONAL WELLNESS R&B

## 2024-06-17 RX ADMIN — NICOTINE POLACRILEX 4 MG: 4 LOZENGE ORAL at 08:41

## 2024-06-17 RX ADMIN — SERTRALINE HYDROCHLORIDE 150 MG: 20 SOLUTION, CONCENTRATE ORAL at 08:35

## 2024-06-17 RX ADMIN — ARIPIPRAZOLE 20 MG: 1 SOLUTION ORAL at 08:34

## 2024-06-17 RX ADMIN — CLONIDINE HYDROCHLORIDE 0.1 MG: 0.1 TABLET ORAL at 08:37

## 2024-06-17 RX ADMIN — NICOTINE POLACRILEX 4 MG: 4 LOZENGE ORAL at 07:03

## 2024-06-17 RX ADMIN — I-VITE, TAB 1000-60-2MG (60/BT) 1 TABLET: TAB at 08:37

## 2024-06-17 RX ADMIN — CLONIDINE HYDROCHLORIDE 0.1 MG: 0.1 TABLET ORAL at 21:16

## 2024-06-17 RX ADMIN — NICOTINE POLACRILEX 4 MG: 4 LOZENGE ORAL at 12:57

## 2024-06-17 RX ADMIN — VALPROIC ACID 1000 MG: 250 SOLUTION ORAL at 21:15

## 2024-06-17 RX ADMIN — CYCLOBENZAPRINE 10 MG: 10 TABLET, FILM COATED ORAL at 09:33

## 2024-06-17 RX ADMIN — VALPROIC ACID 500 MG: 250 SOLUTION ORAL at 08:40

## 2024-06-17 RX ADMIN — NICOTINE POLACRILEX 4 MG: 4 LOZENGE ORAL at 15:59

## 2024-06-17 RX ADMIN — CYCLOBENZAPRINE 10 MG: 10 TABLET, FILM COATED ORAL at 21:16

## 2024-06-17 ASSESSMENT — PAIN - FUNCTIONAL ASSESSMENT: PAIN_FUNCTIONAL_ASSESSMENT: ACTIVITIES ARE NOT PREVENTED

## 2024-06-17 ASSESSMENT — PAIN DESCRIPTION - DESCRIPTORS
DESCRIPTORS: ACHING;DISCOMFORT
DESCRIPTORS: SPASM

## 2024-06-17 ASSESSMENT — PAIN DESCRIPTION - LOCATION
LOCATION: BACK
LOCATION: BACK

## 2024-06-17 ASSESSMENT — PAIN SCALES - GENERAL: PAINLEVEL_OUTOF10: 3

## 2024-06-17 NOTE — GROUP NOTE
Group Therapy Note    Date: 6/16/2024    Group Start Time: 2000  Group End Time: 2025  Group Topic: Relaxation    [facility] Mercedes Blackmon, RN      Group Therapy Note    Attendees: 8/16       Pt refused to attend Relaxation group this evening after encouragement from staff.  1:1 talk time offered as alternative to group session but pt declined. Will continue to encourage patient to attend unit group programming.        Signature:  Mercedes Butterfield, RN

## 2024-06-17 NOTE — GROUP NOTE
Group Therapy Note    Date: 6/17/2024    Group Start Time: 1430  Group End Time: 1540  Group Topic: Cognitive Skills    Jesenia Bay CTRS        Group Therapy Note    Attendees: 8/19     Topic: To increase socialization and practice self expression, and exploring positives, and negatives for wellness and recovery, including where to set some boundaries - using creative expression and discussion.     Patient did not participate in Cognitive Skills Group at 1430, despite staff invitation and explanation of benefits.   Pt was seclusive to self and room during group        Q15 minute safety checks maintained for patient safety and will continue to encourage   patient to attend unit programming.       Discipline Responsible: Psychoeducational Specialist  Signature:  MELIDA GASTON

## 2024-06-17 NOTE — GROUP NOTE
Group Therapy Note    Date: 6/17/2024    Group Start Time: 0900  Group End Time: 0920  Group Topic: Group Documentation    Etta Chowdray, RN        Group Therapy Note    Attendees: 6/20  Community Meeting Group Note        Date: June 17, 2024 Start Time: 9am  End Time:  9:20am      Number of Participants in Group & Unit Census:  6/20    Topic: Goals group    Goal of Group:To establish attainable daily goal(s)      Comments:     Patient did not participate in Community Meeting group, despite staff encouragement and explanation of benefits.  Patient remain seclusive to self.  Q15 minute safety checks maintained for patient safety and will continue to encourage patient to attend unit programming.

## 2024-06-17 NOTE — PROGRESS NOTES
Behavioral Services  Medicare Certification Upon Admission    I certify that this patient's inpatient psychiatric hospital admission is medically necessary for:    [x] (1) Treatment which could reasonably be expected to improve this patient's condition,       [x] (2) Or for diagnostic study;     AND     [x](2) The inpatient psychiatric services are provided while the individual is under the care of a physician and are included in the individualized plan of care.    Estimated length of stay/service 3-5 days    Plan for post-hospital care hc    Electronically signed by Amy Crawford MD on 6/12/2024 at 6:35 PM      
  Daily Progress Note  6/15/2024    Patient Name: Anthony Alan    CHIEF COMPLAINT: Acute psychosis         SUBJECTIVE:      Patient seen face-to-face for follow-up assessment.  He is somewhat cooperative with discussion. Discharge focused. Does appear to be suspicious, but has not been agitated on the unit.  Staff report that he has been able to maintain behavioral control.  Has not required any emergency medications.  He remains compliant with his regimen.  Taking Abilify 20 mg, Zoloft 150 mg, and Depakene twice daily.  Tolerating medications well.  No EPS symptoms observed.  Patient denies feeling paranoid or suspicious at this living situation.  Denies any suicidal or homicidal ideation.  He denies any perceptual disturbances.  Again, concerned that he may not be forthcoming with his current symptoms as he is discharge focused.  He has remained primarily withdrawn and seclusive to his room and staff document that he has not been attending group programming.    Patient has yet to demonstrate stability, requiring continued hospitalization at this time.    Appetite:  [x] Adequate/Unchanged  [] Increased  [] Decreased      Sleep:       [] Adequate/Unchanged  [x] Fair  [] Poor      Group Attendance on Unit:   [] Yes   [] Selectively    [x] No    Compliant with scheduled medications: [x] Yes  [] No    Received emergency medications in past 24 hrs: [] Yes   [x] No    Medication Side Effects: Denies         Mental Status Exam  Level of consciousness: Alert and awake   Appearance: Appropriate attire for setting, seated in chair, with fair  grooming and hygiene   Behavior/Motor: Guarded, minimally engages with interviewer, calm  Attitude toward examiner: Somewhat cooperative, attentive, fair eye contact; becomes hostile  Speech: spontaneous, loud, and interrupting   Mood: Patient reports \"fine\"  Affect: Mood congruent  Thought processes: linear, coherent, and illogical   Thought content:  Denies homicidal 
  Daily Progress Note  6/16/2024    Patient Name: Anthony Alan    CHIEF COMPLAINT: Acute psychosis         SUBJECTIVE:      Patient seen face-to-face for follow-up assessment.  He is pleasant with discussion.  He is less suspicious of writer today and engages in more open-ended conversation.  He is quite discharge focused and displays limited insight regarding events that led to admission.  He has been able to maintain behavioral control here and has not required any emergency medications.  Staff note that he has not required any redirection.  He has been compliant with his scheduled psychotropic regimen.  We reviewed for any side effects and he denies all.  He does become easily distracted and exhibits circumstantial thought process.  Perseverates on need for vitamins.  Patient denies any suicidal or homicidal ideation.  He has not been making any threatening statements or exhibiting agitation.  Noted that patient does have a legal guardian, Newton Pimentel.  He was living in a group home prior to arrival.  Will need to confirm that he is able to return secondary to documentation that notes that patient was cutting electrical wires in the group home due to paranoia.    Appetite:  [x] Adequate/Unchanged  [] Increased  [] Decreased      Sleep:       [x] Adequate/Unchanged  [] Fair  [] Poor      Group Attendance on Unit:   [] Yes   [] Selectively    [x] No    Compliant with scheduled medications: [x] Yes  [] No    Received emergency medications in past 24 hrs: [] Yes   [x] No    Medication Side Effects: Denies         Mental Status Exam  Level of consciousness: Alert and awake   Appearance: Appropriate attire for setting, seated in chair, with fair  grooming and hygiene   Behavior/Motor: Guarded, minimally engages with interviewer, calm  Attitude toward examiner: Somewhat cooperative, attentive, easily distracted, fair eye contact  Speech: spontaneous, loud, and interrupting   Mood: Patient reports \"fine\"  Affect: 
  Daily Progress Note  6/17/2024    Patient Name: Anthony Alan    CHIEF COMPLAINT: Acute psychosis         SUBJECTIVE:    Anthony was seen for follow-up assessment today.  He remains compliant with scheduled medications today.  He has not required any emergency medications for agitation in the last 24 hours.  Nursing staff report patient has been calm and cooperative today.  He has been showering and attending to ADLs.  He continues to be isolative and keeps to himself.  Patient was approached in his room where he was found to be standing.  He was agreeable to conversation.  Patient expressed that he is feeling better and states \"I have stabilized a lot\".  He was asked to identify in which ways he feels he has improved.  He described having a \"calm mind and improved focus\". Indeed patient's irritability has improved.  However, his delusions remain fixed and he is most likely approaching baseline.  He talked of a laser to remove residual trace memories from his hippocampus and feeling the electricity.  He is denying suicidal and homicidal ideation.  He reports pleasant mood and denies symptoms of depression or anxiety.  He is denying auditory and visual hallucinations.  Patient feels he will be ready for discharge tomorrow.    Appetite:  [x] Adequate/Unchanged  [] Increased  [] Decreased      Sleep:       [x] Adequate/Unchanged  [] Fair  [] Poor      Group Attendance on Unit:   [] Yes   [] Selectively    [x] No    Compliant with scheduled medications: [x] Yes  [] No    Received emergency medications in past 24 hrs: [] Yes   [x] No    Medication Side Effects: Denies         Mental Status Exam  Level of consciousness: Alert and awake   Appearance: Appropriate attire for setting, standing, with fair  grooming and hygiene   Behavior/Motor: Approachable, calm  Attitude toward examiner: cooperative, attentive, easily distracted, fair eye contact  Speech: Normal rate and volume  Mood: Euthymic, pleasant  Affect: Mood 
Pharmacy Medication History Note      List of current medications patient is taking is complete.    Source of information: [facility] medication list, [facility] ([phone])    Changes made to medication list:  Medications removed (include reason, ex. therapy complete or physician discontinued, noncompliance):  Perseris - therapy discontinued. Now taking aripiprazole.   Trazodone - therapy discontinued     Medications flagged for provider review:  None     Medications added/doses adjusted:  Aripiprazole 1 mg/mL solution take 15 mL (15 mg) daily   Clonidine 0.1 mg twice daily   Hydroxyzine 10 mg/5 mL syrup take 25 mL (50 mg) twice daily   Multivitamin daily   Nicotine 4 mg gum as needed for smoking cessation   Sertraline 20 mg/mL take 7.5 mL (150 mg) daily   Valproic acid 250 mg/5 mL take 10 mL (500 mg) in the morning and 20 mL (1000 mg) nightly     Other notes (ex. Recent course of antibiotics, Coumadin dosing):  OARRS negative       Current Home Medication List at Time of Admission:  Prior to Admission medications    Medication Sig   ARIPiprazole (ABILIFY) 1 MG/ML SOLN solution Take 15 mLs by mouth daily   cloNIDine (CATAPRES) 0.1 MG tablet Take 1 tablet by mouth 2 times daily   hydrOXYzine (ATARAX) 10 MG/5ML syrup Take 25 mLs by mouth in the morning and at bedtime   nicotine polacrilex (NICORETTE) 4 MG gum Take 1 each by mouth as needed for Smoking cessation   sertraline (ZOLOFT) 20 MG/ML concentrated solution Take 7.5 mLs by mouth daily   Multiple Vitamins-Minerals (EYE MULTIVITAMIN) CAPS Take 1 tablet by mouth daily   valproic acid (DEPAKENE) 250 MG/5ML SOLN oral solution Take 10 mLs by mouth daily   valproic acid (DEPAKENE) 250 MG/5ML SOLN oral solution Take 20 mLs by mouth nightly   valproic acid (DEPAKENE) 250 MG/5ML SOLN oral solution Take 15 mLs by mouth 2 times daily   sertraline (ZOLOFT) 20 MG/ML concentrated solution Take 5 mLs by mouth daily   traZODone (DESYREL) 50 MG tablet Take 1 tablet by 
RT ASSESSMENT TREATMENT GOALS    [x]Pt Goal:  Pt will identify 1-2 positive coping skills by time of discharge.    []Pt Goal:  Pt will identify 1-2 positive aspects of self by time of discharge.    [x]Pt Goal:  Pt will remain on task/topic for 15-30 minutes during group by time of discharge.    []Pt Goal:  Pt will identify 1-2 aspects of relapse prevention plan by time of discharge.    []Pt Goal:  Pt will join in conversation with peers 1-2 times per group by time of discharge.    []Pt Goal:  Pt will identify 1-2 new leisure interests by time of discharge.    []Pt Goal:  Pt will not voice any delusional content by time of discharge.   
Final    Comment: The ADA and AACC recommend providing the estimated average glucose result to permit better   patient understanding of their HBA1c result.           Reviewed patient's current plan of care and vital signs with nursing staff.    Labs reviewed: [x] Yes    Medications  Current Facility-Administered Medications: ARIPiprazole (ABILIFY) 1 MG/ML solution 15 mg, 15 mg, Oral, Daily  cloNIDine (CATAPRES) tablet 0.1 mg, 0.1 mg, Oral, BID  sertraline (ZOLOFT) 20 MG/ML concentrated solution 150 mg, 150 mg, Oral, Daily  valproic acid (DEPAKENE) 250 MG/5ML oral solution 500 mg, 500 mg, Oral, Daily  valproic acid (DEPAKENE) 250 MG/5ML oral solution 1,000 mg, 1,000 mg, Oral, Nightly  acetaminophen (TYLENOL) tablet 650 mg, 650 mg, Oral, Q6H PRN  ibuprofen (ADVIL;MOTRIN) tablet 400 mg, 400 mg, Oral, Q6H PRN  hydrOXYzine HCl (ATARAX) tablet 50 mg, 50 mg, Oral, TID PRN  traZODone (DESYREL) tablet 50 mg, 50 mg, Oral, Nightly PRN  polyethylene glycol (GLYCOLAX) packet 17 g, 17 g, Oral, Daily PRN  aluminum & magnesium hydroxide-simethicone (MAALOX) 200-200-20 MG/5ML suspension 30 mL, 30 mL, Oral, Q6H PRN  nicotine polacrilex (COMMIT) lozenge 4 mg, 4 mg, Oral, Q2H PRN    ASSESSMENT  Paranoid schizophrenia, chronic condition (HCC)         PATIENT HANDOFF  Patient symptoms are: Unstable for discharge  Vitals stable  New lab results: Lipid panel WNL with exception of HDL cholesterol 35 (L); hemoglobin A1c 5.3  Monitor need and frequency of PRN medications.  Encourage participation in groups and milieu.  Medication changes and discharge planning per attending  Follow-up daily while inpatient.     Patient continues to be monitored in the inpatient psychiatric facility at [facility] for safety and stabilization. Patient continues to need, on a daily basis, active treatment furnished directly by or requiring the supervision of inpatient psychiatric personnel.    Electronically signed by ROBERTO Atkinson CNP on 6/13/2024 at 1:56 
Oral, Daily  cloNIDine (CATAPRES) tablet 0.1 mg, 0.1 mg, Oral, BID  sertraline (ZOLOFT) 20 MG/ML concentrated solution 150 mg, 150 mg, Oral, Daily  valproic acid (DEPAKENE) 250 MG/5ML oral solution 500 mg, 500 mg, Oral, Daily  valproic acid (DEPAKENE) 250 MG/5ML oral solution 1,000 mg, 1,000 mg, Oral, Nightly  acetaminophen (TYLENOL) tablet 650 mg, 650 mg, Oral, Q6H PRN  ibuprofen (ADVIL;MOTRIN) tablet 400 mg, 400 mg, Oral, Q6H PRN  hydrOXYzine HCl (ATARAX) tablet 50 mg, 50 mg, Oral, TID PRN  traZODone (DESYREL) tablet 50 mg, 50 mg, Oral, Nightly PRN  polyethylene glycol (GLYCOLAX) packet 17 g, 17 g, Oral, Daily PRN  aluminum & magnesium hydroxide-simethicone (MAALOX) 200-200-20 MG/5ML suspension 30 mL, 30 mL, Oral, Q6H PRN  nicotine polacrilex (COMMIT) lozenge 4 mg, 4 mg, Oral, Q2H PRN    ASSESSMENT  Paranoid schizophrenia, chronic condition (HCC)         PATIENT HANDOFF  Patient symptoms are: Unstable for discharge  Vitals stable  New lab results: Lipid panel WNL with exception of HDL cholesterol 35 (L); hemoglobin A1c 5.3  Monitor need and frequency of PRN medications.  Encourage participation in groups and milieu.  Medication changes and discharge planning per attending  Follow-up daily while inpatient.     Patient continues to be monitored in the inpatient psychiatric facility at [facility] for safety and stabilization. Patient continues to need, on a daily basis, active treatment furnished directly by or requiring the supervision of inpatient psychiatric personnel.    Electronically signed by ROBERTO Carter CNP on 6/14/2024 at 12:36 PM    **This report has been created using voice recognition software. It may contain minor errors which are inherent in voice recognition technology.**                                          Psychiatry Attending Attestation     I independently saw and evaluated the patient.  I reviewed the Advance Practice Provider's documentation above.  Any additional comments or changes

## 2024-06-17 NOTE — GROUP NOTE
Group Therapy Note    Date: 6/17/2024    Group Start Time: 1100  Group End Time: 1150  Group Topic: Cognitive Skills    Jesenia Bay CTRS        Group Therapy Note    Attendees: 8/20     Topic: To increase socialization and practice decision making skills, and communication skills.      Patient did not participate in Cognitive Skills Group at 1100, despite staff invitation and explanation of benefits.   Pt was seclusive to self and room during group        Q15 minute safety checks maintained for patient safety and will continue to encourage   patient to attend unit programming.       Discipline Responsible: Psychoeducational Specialist  Signature:  MELIDA GASTON

## 2024-06-17 NOTE — GROUP NOTE
Group Therapy Note    Date: 6/17/2024    Group Start Time: 1000  Group End Time: 1030  Group Topic: Psychoeducation    Monty Rm        Group Therapy Note    Attendees: 7/20       Patient declined to attend psychotherapy group after encouragement from staff.  1:1 talk time offered but refused.   Signature:  Monty Wilder

## 2024-06-18 VITALS
DIASTOLIC BLOOD PRESSURE: 61 MMHG | HEIGHT: 68 IN | RESPIRATION RATE: 14 BRPM | HEART RATE: 64 BPM | TEMPERATURE: 97.5 F | SYSTOLIC BLOOD PRESSURE: 107 MMHG | BODY MASS INDEX: 23.79 KG/M2 | WEIGHT: 157 LBS | OXYGEN SATURATION: 99 %

## 2024-06-18 PROCEDURE — 99239 HOSP IP/OBS DSCHRG MGMT >30: CPT | Performed by: PSYCHIATRY & NEUROLOGY

## 2024-06-18 PROCEDURE — 6370000000 HC RX 637 (ALT 250 FOR IP): Performed by: NURSE PRACTITIONER

## 2024-06-18 PROCEDURE — 6370000000 HC RX 637 (ALT 250 FOR IP): Performed by: PSYCHIATRY & NEUROLOGY

## 2024-06-18 RX ORDER — ARIPIPRAZOLE ORAL 1 MG/ML
20 SOLUTION ORAL DAILY
Qty: 300 ML | Refills: 0 | Status: SHIPPED | OUTPATIENT
Start: 2024-06-18

## 2024-06-18 RX ORDER — CYCLOBENZAPRINE HCL 10 MG
10 TABLET ORAL 3 TIMES DAILY PRN
Qty: 90 TABLET | Refills: 0 | Status: SHIPPED | OUTPATIENT
Start: 2024-06-18 | End: 2024-07-18

## 2024-06-18 RX ADMIN — VALPROIC ACID 500 MG: 250 SOLUTION ORAL at 08:27

## 2024-06-18 RX ADMIN — HYDROXYZINE HYDROCHLORIDE 50 MG: 50 TABLET, FILM COATED ORAL at 08:29

## 2024-06-18 RX ADMIN — SERTRALINE HYDROCHLORIDE 150 MG: 20 SOLUTION, CONCENTRATE ORAL at 08:27

## 2024-06-18 RX ADMIN — CYCLOBENZAPRINE 10 MG: 10 TABLET, FILM COATED ORAL at 08:27

## 2024-06-18 RX ADMIN — I-VITE, TAB 1000-60-2MG (60/BT) 1 TABLET: TAB at 08:27

## 2024-06-18 RX ADMIN — NICOTINE POLACRILEX 4 MG: 4 LOZENGE ORAL at 08:27

## 2024-06-18 RX ADMIN — CLONIDINE HYDROCHLORIDE 0.1 MG: 0.1 TABLET ORAL at 08:29

## 2024-06-18 ASSESSMENT — PAIN SCALES - GENERAL: PAINLEVEL_OUTOF10: 5

## 2024-06-18 ASSESSMENT — PAIN - FUNCTIONAL ASSESSMENT: PAIN_FUNCTIONAL_ASSESSMENT: ACTIVITIES ARE NOT PREVENTED

## 2024-06-18 ASSESSMENT — PAIN DESCRIPTION - LOCATION: LOCATION: BACK

## 2024-06-18 ASSESSMENT — PAIN DESCRIPTION - DESCRIPTORS: DESCRIPTORS: CRAMPING;SPASM

## 2024-06-18 NOTE — BH NOTE
[facility]  Discharge Note    Pt discharged with followings belongings:   Dental Appliances: None  Vision - Corrective Lenses: None  Hearing Aid: None  Jewelry: None  Body Piercings Removed: N/A  Clothing: Footwear, Pants, Shirt, Socks, Undergarments  Other Valuables: Other (Comment) (none)   Valuables returned to patient. Patient educated on aftercare instructions: follow up appointment @ [facility], medication education. Information faxed to [facility] by nursing staff. Patient discharged to group home.  at 4:11 PM .Patient verbalize understanding of AVS:  yes.    Status EXAM upon discharge:  Mental Status and Behavioral Exam  Normal: No  Level of Assistance: Independent/Self  Facial Expression: Flat  Affect: Blunt  Level of Consciousness: Alert  Frequency of Checks: 4 times per hour, close  Mood:Normal: No  Mood: Depressed, Anxious  Motor Activity:Normal: No  Motor Activity: Decreased  Eye Contact: Fair  Observed Behavior: Preoccupied, Cooperative, Guarded  Sexual Misconduct History: Current - no  Preception: Lyons to person, Lyons to time, Lyons to place, Lyons to situation  Attention:Normal: No  Attention: Distractible  Thought Processes: Circumstantial  Thought Content:Normal: No  Thought Content: Poverty of content, Preoccupations  Depression Symptoms: Isolative, Loss of interest  Anxiety Symptoms: Generalized  Deana Symptoms: No problems reported or observed.  Hallucinations: None  Delusions: No  Delusions: Paranoid  Memory:Normal: No  Memory: Poor recent  Insight and Judgment: No  Insight and Judgment: Poor judgment, Poor insight, Unmotivated    Tobacco Screening:  Practical Counseling, on admission, taryn X, if applicable and completed (first 3 are required if patient doesn't refuse):            ( ) Recognizing danger situations (included triggers and roadblocks)                    ( ) Coping skills (new ways to manage stress,relaxation techniques, changing routine, distraction)

## 2024-06-18 NOTE — DISCHARGE INSTRUCTIONS
Information:  Medications:   Medication summary provided   I understand that I should take only the medications on my list.     -why and when I need to take each medicine.     -which side effects to watch for.     -that I should carry my medication information at all times in case of     Emergency situations.    I will take all of my medicines to follow up appointments.     -check with my physician or pharmacist before taking any new    Medication, over the counter product or drink alcohol.    -Ask about food, drug or dietary supplement interactions.    -discard old lists and update records with medication providers.    Notify Physician:  Notify physician if you notice:   Always call 911 if you feel your life is in danger  In case of an emergency call 911 immediately!  If 911 is not available call your local emergency medical system for help    Behavioral Health Follow Up:  Original Referral Source:medical  Discharge Diagnosis: Acute psychosis (HCC) [F23]  Recommendations for Level of Care: follow up appointment @ [facility], take meds as prescribed  Patient status at discharge: denies thoughts of harm to self or others, stabilized on meds  [facility] hospital  was: [facility]  Aftercare plan faxed: Alok   -faxed by: nursing staff   -date: 6/18/24   -time: 1400  Prescriptions: sent to [facility]    Smoking: Quit Smoking.   Call the [employer]'s smoking quitline at 7-662-03O-QUIT  Know the signs of a heart attack   If you have any of the following symptoms call 911 immediately, do not wait more    Than five minutes.    1. Pressure, fullness and/ or squeezing in the center of the chest spreading to    The jaw, neck or shoulder.    2. Chest discomfort with light headedness, fainting, sweating, nausea or    Shortness of breath.   3. Upper abdominal pressure or discomfort.   4. Lower chest pain, back pain, unusual fatigue, shortness of breath, nausea   Or dizziness.     General Information:   Questions regarding your bill:

## 2024-06-18 NOTE — GROUP NOTE
Group Therapy Note    Date: 6/18/2024    Group Start Time: 0900  Group End Time: 0945  Group Topic: Community Meeting    Ebony Bustillo        Group Therapy Note    Attendees: 8/20         Community Meeting Group Note        Date: June 18, 2024 Start Time: 9am  End Time:  0945      Number of Participants in Group & Unit Census:  8/20      Topic: Goal Setting    Goal of Group: Set short term goal for the day      Comments:     Patient did not participate in Community Meeting group, despite staff encouragement and explanation of benefits.  Patient remain seclusive to self.  Q15 minute safety checks maintained for patient safety and will continue to encourage patient to attend unit programming.

## 2024-06-18 NOTE — GROUP NOTE
Group Therapy Note    Date: 6/18/2024    Group Start Time: 1015  Group End Time: 1045  Group Topic: Psychoeducation    Monty Rm  Patient declined to attend psychotherapy group after encouragement from staff.  1:1 talk time offered but refused.         Group Therapy Note    Attendees: 10/20       Signature:  Monty Wilder     Pt has some concerns/questions regarding test results, please review..

## 2024-06-18 NOTE — PLAN OF CARE
Problem: Involuntary Admit  Goal: Will cooperate with staff recommendations and doctor's orders and will demonstrate appropriate behavior  Description: INTERVENTIONS:  1. Treat underlying conditions and offer medication as ordered  2. Educate regarding involuntary admission procedures and rules  3. Contain excessive/inappropriate behavior per unit and hospital policies  Outcome: Completed     Problem: Risk for Elopement  Goal: Patient will not exit the unit/facility without proper excort  Note: Mr. Alan has remained free of elopement behaviors during this shift. He remains isolative to his room and comes out for meals and needs only.      Problem: Psychosis  Goal: Will report no hallucinations or delusions  Description: INTERVENTIONS:  1. Administer medication as  ordered  2. Assist with reality testing to support increasing orientation  3. Assess if patient's hallucinations or delusions are encouraging self harm or harm to others and intervene as appropriate  Note: Mr. Alan is observed seated at the desk in his room, staring at the wall frequently throughout the shift. He is observed speaking softly to himself responding to internal stimuli. He denies auditory hallucinations.     Problem: Behavior  Goal: Pt/Family maintain appropriate behavior and adhere to behavioral management agreement, if implemented  Description: INTERVENTIONS:  1. Assess patient/family's coping skills and  non-compliant behavior (including use of illegal substances)  2. Notify security of behavior or suspected illegal substances which indicate the need for search of the family and/or belongings  3. Encourage verbalization of thoughts and concerns in a socially appropriate manner  4. Utilize positive, consistent limit setting strategies supporting safety of patient, staff and others  5. Encourage participation in the decision making process about the behavioral management agreement  6. If a visitor's behavior poses a threat 
  Problem: Psychosis  Goal: Will report no hallucinations or delusions  Description: INTERVENTIONS:  1. Administer medication as  ordered  2. Assist with reality testing to support increasing orientation  3. Assess if patient's hallucinations or delusions are encouraging self harm or harm to others and intervene as appropriate  Outcome: Progressing  Note: Patient is accepting of 1:1 talk time with staff. Patient is eating and sleeping well. Patient admits to depression and anxiety. Patient is isolative to self and room. Patient is flat and guarded. Patient is medication compliant. Patient denies suicidal and homicidal ideation and auditory and visual hallucinations and verbally agrees to approach staff if thoughts of self harm arises. Reassurance and support provided. Q15 minute checks maintained. Patient remains safe at this time.      
  Problem: Risk for Elopement  Goal: Patient will not exit the unit/facility without proper excort  6/12/2024 2202 by Aron Villasenor RN  Outcome: Progressing  Note: Patient does not present exit seeking behaviors      Problem: Psychosis  Goal: Will report no hallucinations or delusions  Description: INTERVENTIONS:  1. Administer medication as  ordered  2. Assist with reality testing to support increasing orientation  3. Assess if patient's hallucinations or delusions are encouraging self harm or harm to others and intervene as appropriate  6/12/2024 2202 by Aron Villasenor RN  Outcome: Progressing  Note: Patient denied auditory and visual hallucinations      Problem: Behavior  Goal: Pt/Family maintain appropriate behavior and adhere to behavioral management agreement, if implemented  Description: INTERVENTIONS:  1. Assess patient/family's coping skills and  non-compliant behavior (including use of illegal substances)  2. Notify security of behavior or suspected illegal substances which indicate the need for search of the family and/or belongings  3. Encourage verbalization of thoughts and concerns in a socially appropriate manner  4. Utilize positive, consistent limit setting strategies supporting safety of patient, staff and others  5. Encourage participation in the decision making process about the behavioral management agreement  6. If a visitor's behavior poses a threat to safety call refer to organization policy.  7. Initiate consult with , Psychosocial CNS, Spiritual Care as appropriate  6/12/2024 2202 by Aron Villasenor RN  Outcome: Progressing  Note: Patient reports he is doing good today, He denied depression and reports minimal anxiety. He remains flat and aloof from peers in dayroom. He denied thoughts of hurting himself or others. Patient remains free from self harm and injury. He ate snack and was medication compliant      Problem: Involuntary Admit  Goal: Will cooperate 
  Problem: Risk for Elopement  Goal: Patient will not exit the unit/facility without proper excort  6/13/2024 2239 by Aron Villasenor RN  Outcome: Progressing  Note: Patient does not present exit seeking behaviors      Problem: Psychosis  Goal: Will report no hallucinations or delusions  Description: INTERVENTIONS:  1. Administer medication as  ordered  2. Assist with reality testing to support increasing orientation  3. Assess if patient's hallucinations or delusions are encouraging self harm or harm to others and intervene as appropriate  6/13/2024 2239 by Aron Villasenor RN  Outcome: Progressing  Note: Patient denied all feeling of anxiety, depression and hallucinations. He remains flat and guarded aloof from peers in the dayroom. He denied thoughts of hurting himself or others. He remains free from self harm and injury. Patient was medication compliant and ate snack     Problem: Behavior  Goal: Pt/Family maintain appropriate behavior and adhere to behavioral management agreement, if implemented  Description: INTERVENTIONS:  1. Assess patient/family's coping skills and  non-compliant behavior (including use of illegal substances)  2. Notify security of behavior or suspected illegal substances which indicate the need for search of the family and/or belongings  3. Encourage verbalization of thoughts and concerns in a socially appropriate manner  4. Utilize positive, consistent limit setting strategies supporting safety of patient, staff and others  5. Encourage participation in the decision making process about the behavioral management agreement  6. If a visitor's behavior poses a threat to safety call refer to organization policy.  7. Initiate consult with , Psychosocial CNS, Spiritual Care as appropriate  6/13/2024 2239 by Aron Villasenor RN  Outcome: Progressing     Problem: Involuntary Admit  Goal: Will cooperate with staff recommendations and doctor's orders and will demonstrate 
  Problem: Risk for Elopement  Goal: Patient will not exit the unit/facility without proper excort  6/15/2024 0320 by Mercedes Butterfield, RN  Outcome: Progressing  Flowsheets (Taken 6/15/2024 0320)  Nursing Interventions for Elopement Risk:   Make sure patient has all necessary personal care items   Place patient in room far away from exits and stairways   Reduce environmental triggers  Note: No elopement behaviors noted this shift.     Problem: Psychosis  Goal: Will report no hallucinations or delusions  Description: INTERVENTIONS:  1. Administer medication as  ordered  2. Assist with reality testing to support increasing orientation  3. Assess if patient's hallucinations or delusions are encouraging self harm or harm to others and intervene as appropriate  6/15/2024 0320 by Mercedes Butterfield, RN  Outcome: Progressing  Note: Pt denies any hallucinations, no delusions reported or observed this shift.     Problem: Behavior  Goal: Pt/Family maintain appropriate behavior and adhere to behavioral management agreement, if implemented  Description: INTERVENTIONS:  1. Assess patient/family's coping skills and  non-compliant behavior (including use of illegal substances)  2. Notify security of behavior or suspected illegal substances which indicate the need for search of the family and/or belongings  3. Encourage verbalization of thoughts and concerns in a socially appropriate manner  4. Utilize positive, consistent limit setting strategies supporting safety of patient, staff and others  5. Encourage participation in the decision making process about the behavioral management agreement  6. If a visitor's behavior poses a threat to safety call refer to organization policy.  7. Initiate consult with , Psychosocial CNS, Spiritual Care as appropriate  6/15/2024 0320 by Mercedes Butterfield, RN  Outcome: Progressing  Flowsheets (Taken 6/15/2024 0320)  Patient/family maintains appropriate behavior and adheres to 
  Problem: Risk for Elopement  Goal: Patient will not exit the unit/facility without proper excort  6/17/2024 1352 by Tejal Hobson LPN  Outcome: Progressing   Patient does not exhibit any exit seeking behaviors.   Problem: Psychosis  Goal: Will report no hallucinations or delusions  Description: INTERVENTIONS:  1. Administer medication as  ordered  2. Assist with reality testing to support increasing orientation  3. Assess if patient's hallucinations or delusions are encouraging self harm or harm to others and intervene as appropriate  6/17/2024 1352 by Tejal Hobson LPN  Outcome: Progressing   Patient denies any hallucinations  Problem: Behavior  Goal: Pt/Family maintain appropriate behavior and adhere to behavioral management agreement, if implemented  Description: INTERVENTIONS:  1. Assess patient/family's coping skills and  non-compliant behavior (including use of illegal substances)  2. Notify security of behavior or suspected illegal substances which indicate the need for search of the family and/or belongings  3. Encourage verbalization of thoughts and concerns in a socially appropriate manner  4. Utilize positive, consistent limit setting strategies supporting safety of patient, staff and others  5. Encourage participation in the decision making process about the behavioral management agreement  6. If a visitor's behavior poses a threat to safety call refer to organization policy.  7. Initiate consult with , Psychosocial CNS, Spiritual Care as appropriate  6/17/2024 1352 by Tejal Hobson LPN  Outcome: Progressing   Mr. Alan is seen in his room, affect is flat, he is not making any delusional statements. Denies all, comes out for meals and needs. He is able to return to his group home at discharge. Anthony is taking medications,attending to his hygiene. Isolative to self.   Problem: Safety - Adult  Goal: Free from fall injury  Outcome: Progressing   Patient remains free from 
  Problem: Risk for Elopement  Goal: Patient will not exit the unit/facility without proper excort  6/17/2024 2003 by Alida Vigil, RN  Outcome: Progressing     Problem: Behavior  Goal: Pt/Family maintain appropriate behavior and adhere to behavioral management agreement, if implemented  Description: INTERVENTIONS:  1. Assess patient/family's coping skills and  non-compliant behavior (including use of illegal substances)  2. Notify security of behavior or suspected illegal substances which indicate the need for search of the family and/or belongings  3. Encourage verbalization of thoughts and concerns in a socially appropriate manner  4. Utilize positive, consistent limit setting strategies supporting safety of patient, staff and others  5. Encourage participation in the decision making process about the behavioral management agreement  6. If a visitor's behavior poses a threat to safety call refer to organization policy.  7. Initiate consult with , Psychosocial CNS, Spiritual Care as appropriate  6/17/2024 2003 by Alida Vigil, RN  Outcome: Progressing  Note: Patient remains safe on unit, with no attempt or stated desire to elope.  Behaviorally compliant, requiring no emergency medications.  Encouraged to seek staff in intrusive thoughts return. Every 15 minute rounds in place per policy.     
  Problem: Risk for Elopement  Goal: Patient will not exit the unit/facility without proper excort  Outcome: Progressing   Patient exhibits no exit seeking behaviors  Problem: Psychosis  Goal: Will report no hallucinations or delusions  Description: INTERVENTIONS:  1. Administer medication as  ordered  2. Assist with reality testing to support increasing orientation  3. Assess if patient's hallucinations or delusions are encouraging self harm or harm to others and intervene as appropriate  Outcome: Progressing   Mr. Alan is seen in his room, he was somewhat irritable this morning, fixated on \"having more medical issues then mental ones\" speech is tangential. Did state that he feels he is sleeping too much. Referring to things in his body as \"tubes and hoses\"Remained in control and required no emergency medications.   Problem: Behavior  Goal: Pt/Family maintain appropriate behavior and adhere to behavioral management agreement, if implemented  Description: INTERVENTIONS:  1. Assess patient/family's coping skills and  non-compliant behavior (including use of illegal substances)  2. Notify security of behavior or suspected illegal substances which indicate the need for search of the family and/or belongings  3. Encourage verbalization of thoughts and concerns in a socially appropriate manner  4. Utilize positive, consistent limit setting strategies supporting safety of patient, staff and others  5. Encourage participation in the decision making process about the behavioral management agreement  6. If a visitor's behavior poses a threat to safety call refer to organization policy.  7. Initiate consult with , Psychosocial CNS, Spiritual Care as appropriate  Outcome: Progressing   Patient remains in behavioral control  
  Problem: Risk for Elopement  Goal: Patient will not exit the unit/facility without proper excort  Outcome: Progressing  Flowsheets (Taken 6/11/2024 1556 by Job Portillo, RN)  Nursing Interventions for Elopement Risk:   Shoes and clothing collected and placed in gown attire   Reduce environmental triggers  Note: Patient does not present exit seeking behaviors     Problem: Psychosis  Goal: Will report no hallucinations or delusions  Description: INTERVENTIONS:  1. Administer medication as  ordered  2. Assist with reality testing to support increasing orientation  3. Assess if patient's hallucinations or delusions are encouraging self harm or harm to others and intervene as appropriate  Outcome: Progressing  Note: Patient denied auditory and visual hallucinations. He appeared to be guarded and flat during conversation. He reported his anxiety to be 3/10 and depression 1/10. Patient denied thoughts of hurting himself or others. He showered.      Problem: Behavior  Goal: Pt/Family maintain appropriate behavior and adhere to behavioral management agreement, if implemented  Description: INTERVENTIONS:  1. Assess patient/family's coping skills and  non-compliant behavior (including use of illegal substances)  2. Notify security of behavior or suspected illegal substances which indicate the need for search of the family and/or belongings  3. Encourage verbalization of thoughts and concerns in a socially appropriate manner  4. Utilize positive, consistent limit setting strategies supporting safety of patient, staff and others  5. Encourage participation in the decision making process about the behavioral management agreement  6. If a visitor's behavior poses a threat to safety call refer to organization policy.  7. Initiate consult with , Psychosocial CNS, Spiritual Care as appropriate  Outcome: Progressing     Problem: Involuntary Admit  Goal: Will cooperate with staff recommendations and doctor's orders 
  Problem: Risk for Elopement  Goal: Patient will not exit the unit/facility without proper excort  Outcome: Progressing  Note: Patient does not attempt to elope but does this discharge was supposed to be scheduled for today      Problem: Psychosis  Goal: Will report no hallucinations or delusions  Description: INTERVENTIONS:  1. Administer medication as  ordered  2. Assist with reality testing to support increasing orientation  3. Assess if patient's hallucinations or delusions are encouraging self harm or harm to others and intervene as appropriate  Outcome: Progressing  Note: Patient continues with delusions with a paper written and given to staff about his beliefs of a \"anthrax farm\" near old apartment      Problem: Behavior  Goal: Pt/Family maintain appropriate behavior and adhere to behavioral management agreement, if implemented  Description: INTERVENTIONS:  1. Assess patient/family's coping skills and  non-compliant behavior (including use of illegal substances)  2. Notify security of behavior or suspected illegal substances which indicate the need for search of the family and/or belongings  3. Encourage verbalization of thoughts and concerns in a socially appropriate manner  4. Utilize positive, consistent limit setting strategies supporting safety of patient, staff and others  5. Encourage participation in the decision making process about the behavioral management agreement  6. If a visitor's behavior poses a threat to safety call refer to organization policy.  7. Initiate consult with , Psychosocial CNS, Spiritual Care as appropriate  Outcome: Progressing  Note: Free from behaviors this shift 15 min safety checks continue      
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 Patient was verbally agressive towards police. Patient has controlled delusions.  Estimated Length of Stay: 5-7 days  Estimated Discharge Date Update: to be determined by physician    PATIENT STRENGTHS:  Patient Strengths    Patient Strengths and Limitations:Limitations: Difficulty problem solving/relies on others to help solve problems, Unrealistic self-view, Multiple barriers to leisure interests, Hopeless about future  Addictive Behavior:Addictive Behavior  In the Past 3 Months, Have You Felt or Has Someone Told You That You Have a Problem With  : None  Medical Problems:  Past Medical History:   Diagnosis Date    Hypertension     Schizophrenia, schizo-affective (HCC)        Risk:  Fall Risk   Roque Scale Roque Scale Score: 22  BVC    Change in scores no Changes to plan of Care no    Status EXAM:   Mental Status and Behavioral Exam  Normal: No  Level of Assistance: Independent/Self  Facial Expression: Expressionless  Affect: Blunt  Level of Consciousness: Alert  Frequency of Checks: 4 times per hour, close  Mood:Normal: No  Mood: Helpless, Empty  Motor Activity:Normal: Yes  Motor Activity: Decreased  Eye Contact: Fair  Observed Behavior: Preoccupied, Cooperative, Guarded  Sexual Misconduct History: Current - no  Preception: [place] to person, [place] to time, [place] to place, [place] to situation  Attention:Normal: No  Attention: Distractible  Thought Processes: Unremarkable  Thought Content:Normal: No  Thought Content: Preoccupations, Poverty of content  Depression Symptoms: Isolative, Impaired concentration  Anxiety Symptoms: No problems reported or observed.  Deana Symptoms: No problems reported or 
Judgment: No  Insight and Judgment: Poor judgment, Poor insight    EDUCATION:   Learner Progress Toward Treatment Goals: Will review group plans and strategies for care.    Method: Group therapy, Medication compliance, Individualized assessments and Care planning.    Outcome: Needs Reinforcement    PATIENT GOALS: To be discussed with patient within 72 hours    PLAN/TREATMENT RECOMMENDATIONS:     Continue group therapy , Medications compliance, Goal setting, Individualized assessments and Care planning, continue to monitor patient on unit.      SHORT-TERM GOALS:   Time frame for Short-Term Goals: 5-7 days    LONG-TERM GOALS:  Time frame for Long-Term Goals: 6 months  Members Present in Team Meeting: See Signature Sheet    Sandra Hendrix RN    
RN  Outcome: Progressing  Flowsheets (Taken 6/17/2024 0107)  Patient/family maintains appropriate behavior and adheres to behavioral management agreement, if implemented:   Assess patient/family’s coping skills and  non-compliant behavior (including use of illegal substances)   Encourage verbalization of thoughts and concerns in a socially appropriate manner   Encourage participation in the decision making process about the behavioral management agreement  Note: Patient remains compliant with medication, no behavior issues noted. However, patient continues to isolate and not attending unit group programs.     Problem: Risk for Elopement  Goal: Patient will not exit the unit/facility without proper excort  6/17/2024 0107 by Mercedes Butterfield, RN  Outcome: Progressing  Flowsheets (Taken 6/17/2024 0107)  Nursing Interventions for Elopement Risk:   Reduce environmental triggers   Make sure patient has all necessary personal care items   Place patient in room far away from exits and stairways  Note: No elopement behavior noted this shift.     
light to turn on and off. Pt is not accepting of this and says \" I know what it is for.\"

## 2024-06-18 NOTE — TRANSITION OF CARE
Urine TRACE (A) NEGATIVE mg/dL    Specific Gravity, UA 1.022 1.000 - 1.030    Urine Hgb NEGATIVE NEGATIVE    pH, Urine 8.0 5.0 - 8.0    Protein, UA NEGATIVE NEGATIVE mg/dL    Urobilinogen, Urine Normal 0.0 - 1.0 EU/dL    Nitrite, Urine NEGATIVE NEGATIVE    Leukocyte Esterase, Urine NEGATIVE NEGATIVE    Comment       Microscopic exam not performed based on chemical results unless requested in original order.       Immunizations administered during this encounter:   Immunization History   Administered Date(s) Administered    TDaP, ADACEL (age 10y-64y), BOOSTRIX (age 10y+), IM, 0.5mL 04/14/2024     Influenza Vaccination Status: None of the above/Not documented/Unable to determine from medical record documentation    Screening for Metabolic Disorders for Patients on Antipsychotic Medications  (Data obtained from the EMR)    Estimated Body Mass Index  Body mass index is 23.87 kg/m².      Vital Signs/Blood Pressure  /61   Pulse 64   Temp 97.5 °F (36.4 °C) (Temporal)   Resp 14   Ht 1.727 m (5' 8\")   Wt 71.2 kg (157 lb)   SpO2 99%   BMI 23.87 kg/m²      Fasting Blood Glucose or Hemoglobin A1c  No results found for: \"GLU\", \"GLUCPOC\"    Hemoglobin A1C   Date Value Ref Range Status   06/11/2024 5.3 4.0 - 6.0 % Final       Discharge Diagnosis: Paranoid schizophrenia, chronic condition     Discharge Plan/Destination: home    Discharge Medication List and Instructions:      Medication List        START taking these medications      cyclobenzaprine 10 MG tablet  Commonly known as: FLEXERIL  Take 1 tablet by mouth 3 times daily as needed for Muscle spasms  Notes to patient: For muscle spasms            CHANGE how you take these medications      ARIPiprazole 1 MG/ML Soln solution  Commonly known as: ABILIFY  Take 20 mLs by mouth daily  What changed: how much to take  Notes to patient: For mood/clears thoughts            CONTINUE taking these medications      cloNIDine 0.1 MG tablet  Commonly known as: CATAPRES  Notes to

## 2024-06-18 NOTE — GROUP NOTE
Group Therapy Note    Date: 6/18/2024    Group Start Time: 1330  Group End Time: 1420  Group Topic: Cognitive Skills    [facility]        Group Therapy Note    Attendees: 8/18     Topic: To increase socialization and practice decision making and concentration skills.      Patient did not participate in Cognitive Skills Group at 1330, despite staff invitation and explanation of benefits.   Pt was seclusive to self and room during group        Q15 minute safety checks maintained for patient safety and will continue to encourage   patient to attend unit programming.       Discipline Responsible: Psychoeducational Specialist  Signature:  MELIDA GASTON

## 2024-06-18 NOTE — CARE COORDINATION
BHI Biopsychosocial Assessment    Current Level of Psychosocial Functioning     Independent   Dependent  XX  Minimal Assist     Comments:    Psychosocial High Risk Factors (check all that apply)    Unable to obtain meds   Chronic illness/pain    Substance abuse   Lack of Family Support   Financial stress   Isolation   Inadequate Community Resources  Suicide attempt(s)  Not taking medications   Victim of crime   Developmental Delay  Unable to manage personal needs XX    Age 65 or older   Homeless  No transportation   Readmission within 30 days  Unemployment  Traumatic Event    Comments:   Psychiatric Advanced Directives: n/a    Family to Involve in Treatment: n/a    Sexual Orientation:  n/a    Patient Strengths: Patient has LG, lives at group home, linked to [facility], has insurance    Patient Barriers: Hx of admissions       Opiate Education Provided:  denies      CMHC/mental health history: Linked to [facility]    Plan of Care   medication management, group/individual therapies, family meetings, psycho -education, treatment team meetings to assist with stabilization    Initial Discharge Plan: Return to group home and continue services with [facility]        Clinical Summary: Patient is a 33 year old male admitted to [facility] for symptoms of psychosis. Patient has a hx of prior admissions. Patient's most recent admission being 5/12/2023-5/13/2023. Newton Pimentel is patient's legal guardian. Patient is linked to [facility]. Patient lives at group home. Patient has minimal insight and states \"what happened before I came in has been solved\". Patient denies suicidal and homicidal ideations during assessment. He also denies experiencing auditory or visual hallucinations. Social work to provide support and assist with discharge planning.        
Social work spoke to Kinjal from [facility]. Patient to be picked up at 12PM.   
Social work spoke to Kinjal from [facility]. She states patient should be sent by cab home.  
Social work spoke to Samantha from [facility] notifying of intended of discharge on 6/18. She provided contact group [facility] [phone]. Samantha states to call the on call line and that [facility] will transport patient to group home. Meds to be sent to [facility] on [address] MD notified.  
Social work spoke to group home coordinator Farnaz ([phone]) notifying her of patient's intended discharge on 6/18. She states [facility] can transport patient home at \"any time\". LG Newton Pimentel notified of intended discharge.  
Voicemail left for patient's legal guardian Newton Pimentel regarding patient's intended discharge of 6/18.  
solution  cyclobenzaprine 10 MG tablet         Follow Up Appointment: [facility], Jorge  [address] [facility] [address]  [phone]    Go on 6/24/2024  At 2:45PM-Medication management appointment

## 2024-06-18 NOTE — BH NOTE
Patient given tobacco quitline number 85822061408 at this time, refusing to call at this time, states \" I just dont want to quit now\"- patient given information as to the dangers of long term tobacco use. Continue to reinforce the importance of tobacco cessation.

## 2024-06-19 NOTE — DISCHARGE SUMMARY
Provider Discharge Summary     Patient ID:  Anthony Alan  [id number]  33 y.o.  1990    Admit date: 6/11/2024    Discharge date and time: 6/18/2024  10:09 PM     Admitting Physician: Francis Menjivar MD     Discharge Physician: Amy Crawford MD    Admission Diagnoses: Acute psychosis ([facility]) [F23]    Discharge Diagnoses:      Paranoid schizophrenia, chronic condition ([facility]ly [facility])     Patient Active Problem List   Diagnosis Code    Schizophrenia ([facility]) F20.9    Cannabis dependence ([facility]) F12.20    Chest pain R07.9    Acute psychosis ([facility]) F23    Paranoid schizophrenia, chronic condition ([facility]) F20.0    Cannabis use disorder F12.90        Admission Condition: poor    Discharged Condition: stable    Indication for Admission: threat to self    History of Present Illnes (present tense wording is of findings from admission exam and are not necessarily indicative of current findings):   Anthony Alan is a 33 y.o. male who has a past medical history of schizophrenia. Patient presented to the ED on 6/11 by the police who were called to the pt's group home due to the pt being found cutting electrical wires. The pt was noted to be displaying bizarre behaviors and expressed delusional ideas stating he is working for the [employer]. The pt does state he is not taking his Zoloft because \"there is alcohol in it.\" He also reports not taking his Depakote because he \"doesn't like it.\" The group home did confirm with the ED provider that the pt has not been compliant with his medications. It is noted that the pt has not been taking care of himself and has poor insight. The pt was pink slipped to [facility]. The pt has a history of multiple psychiatric admissions to different facilities including [facility], [facility], and [facility]. The last [facility] admission was in 5/2023 for delusional disorder and acute psychosis. The pt was discharged on 750 mg of Valproic Acid 2 times a day, 100 mg of Zoloft, 50mg of Trazodone, and Perseris LOMBARDI. Since his last admission,

## 2024-07-01 RX ORDER — ARIPIPRAZOLE ORAL 1 MG/ML
SOLUTION ORAL
Qty: 300 ML | Refills: 0 | OUTPATIENT
Start: 2024-07-01

## 2024-07-10 ENCOUNTER — HOSPITAL ENCOUNTER (INPATIENT)
Age: 34
LOS: 6 days | Discharge: OTHER INSTITUTION | End: 2024-07-16
Attending: EMERGENCY MEDICINE | Admitting: PSYCHIATRY & NEUROLOGY
Payer: COMMERCIAL

## 2024-07-10 DIAGNOSIS — F23 ACUTE PSYCHOSIS (HCC): Primary | ICD-10-CM

## 2024-07-10 LAB
ALBUMIN SERPL-MCNC: 4.6 G/DL (ref 3.5–5.2)
ALP SERPL-CCNC: 75 U/L (ref 40–129)
ALT SERPL-CCNC: 8 U/L (ref 5–41)
AMPHET UR QL SCN: NEGATIVE
ANION GAP SERPL CALCULATED.3IONS-SCNC: 12 MMOL/L (ref 9–17)
APAP SERPL-MCNC: <5 UG/ML (ref 10–30)
AST SERPL-CCNC: 9 U/L
BARBITURATES UR QL SCN: NEGATIVE
BASOPHILS # BLD: 0.1 K/UL (ref 0–0.2)
BASOPHILS NFR BLD: 1 % (ref 0–2)
BENZODIAZ UR QL: NEGATIVE
BILIRUB SERPL-MCNC: 0.2 MG/DL (ref 0.3–1.2)
BUN SERPL-MCNC: 11 MG/DL (ref 6–20)
CALCIUM SERPL-MCNC: 9.6 MG/DL (ref 8.6–10.4)
CANNABINOIDS UR QL SCN: POSITIVE
CHLORIDE SERPL-SCNC: 101 MMOL/L (ref 98–107)
CO2 SERPL-SCNC: 23 MMOL/L (ref 20–31)
COCAINE UR QL SCN: NEGATIVE
CREAT SERPL-MCNC: 0.9 MG/DL (ref 0.7–1.2)
EOSINOPHIL # BLD: 0.3 K/UL (ref 0–0.4)
EOSINOPHILS RELATIVE PERCENT: 3 % (ref 0–4)
ERYTHROCYTE [DISTWIDTH] IN BLOOD BY AUTOMATED COUNT: 14.6 % (ref 11.5–14.9)
ETHANOL PERCENT: <0.01 %
ETHANOLAMINE SERPL-MCNC: <10 MG/DL (ref 0–0.08)
FENTANYL UR QL: NEGATIVE
GFR, ESTIMATED: >90 ML/MIN/1.73M2
GLUCOSE SERPL-MCNC: 115 MG/DL (ref 70–99)
HCT VFR BLD AUTO: 48.1 % (ref 41–53)
HGB BLD-MCNC: 16 G/DL (ref 13.5–17.5)
LYMPHOCYTES NFR BLD: 2.6 K/UL (ref 1–4.8)
LYMPHOCYTES RELATIVE PERCENT: 27 % (ref 24–44)
MCH RBC QN AUTO: 31.9 PG (ref 26–34)
MCHC RBC AUTO-ENTMCNC: 33.2 G/DL (ref 31–37)
MCV RBC AUTO: 96.3 FL (ref 80–100)
METHADONE UR QL: NEGATIVE
MONOCYTES NFR BLD: 0.6 K/UL (ref 0.1–1.3)
MONOCYTES NFR BLD: 7 % (ref 1–7)
NEUTROPHILS NFR BLD: 62 % (ref 36–66)
NEUTS SEG NFR BLD: 5.9 K/UL (ref 1.3–9.1)
OPIATES UR QL SCN: NEGATIVE
OXYCODONE UR QL SCN: NEGATIVE
PCP UR QL SCN: NEGATIVE
PLATELET # BLD AUTO: 379 K/UL (ref 150–450)
PMV BLD AUTO: 6.7 FL (ref 6–12)
POTASSIUM SERPL-SCNC: 4.4 MMOL/L (ref 3.7–5.3)
PROT SERPL-MCNC: 7.8 G/DL (ref 6.4–8.3)
RBC # BLD AUTO: 5 M/UL (ref 4.5–5.9)
SALICYLATES SERPL-MCNC: <1 MG/DL (ref 3–10)
SODIUM SERPL-SCNC: 136 MMOL/L (ref 135–144)
TEST INFORMATION: ABNORMAL
WBC OTHER # BLD: 9.6 K/UL (ref 3.5–11)

## 2024-07-10 PROCEDURE — 36415 COLL VENOUS BLD VENIPUNCTURE: CPT

## 2024-07-10 PROCEDURE — 80053 COMPREHEN METABOLIC PANEL: CPT

## 2024-07-10 PROCEDURE — 6360000002 HC RX W HCPCS: Performed by: PSYCHIATRY & NEUROLOGY

## 2024-07-10 PROCEDURE — 85025 COMPLETE CBC W/AUTO DIFF WBC: CPT

## 2024-07-10 PROCEDURE — 99223 1ST HOSP IP/OBS HIGH 75: CPT | Performed by: NURSE PRACTITIONER

## 2024-07-10 PROCEDURE — 99285 EMERGENCY DEPT VISIT HI MDM: CPT

## 2024-07-10 PROCEDURE — 80307 DRUG TEST PRSMV CHEM ANLYZR: CPT

## 2024-07-10 PROCEDURE — 6370000000 HC RX 637 (ALT 250 FOR IP): Performed by: PSYCHIATRY & NEUROLOGY

## 2024-07-10 PROCEDURE — 80143 DRUG ASSAY ACETAMINOPHEN: CPT

## 2024-07-10 PROCEDURE — G0480 DRUG TEST DEF 1-7 CLASSES: HCPCS

## 2024-07-10 PROCEDURE — 80179 DRUG ASSAY SALICYLATE: CPT

## 2024-07-10 PROCEDURE — 1240000000 HC EMOTIONAL WELLNESS R&B

## 2024-07-10 RX ORDER — HALOPERIDOL 5 MG/1
5 TABLET ORAL EVERY 6 HOURS PRN
Status: DISCONTINUED | OUTPATIENT
Start: 2024-07-10 | End: 2024-07-16 | Stop reason: HOSPADM

## 2024-07-10 RX ORDER — VALPROIC ACID 250 MG/5ML
500 SOLUTION ORAL 2 TIMES DAILY
Status: DISCONTINUED | OUTPATIENT
Start: 2024-07-10 | End: 2024-07-16 | Stop reason: HOSPADM

## 2024-07-10 RX ORDER — LORAZEPAM 2 MG/ML
2 INJECTION INTRAMUSCULAR EVERY 6 HOURS PRN
Status: DISCONTINUED | OUTPATIENT
Start: 2024-07-10 | End: 2024-07-16 | Stop reason: HOSPADM

## 2024-07-10 RX ORDER — TRAZODONE HYDROCHLORIDE 50 MG/1
50 TABLET ORAL NIGHTLY PRN
Status: DISCONTINUED | OUTPATIENT
Start: 2024-07-10 | End: 2024-07-16 | Stop reason: HOSPADM

## 2024-07-10 RX ORDER — SERTRALINE HYDROCHLORIDE 20 MG/ML
50 SOLUTION ORAL DAILY
Status: DISCONTINUED | OUTPATIENT
Start: 2024-07-11 | End: 2024-07-16 | Stop reason: HOSPADM

## 2024-07-10 RX ORDER — IBUPROFEN 400 MG/1
400 TABLET ORAL EVERY 6 HOURS PRN
Status: DISCONTINUED | OUTPATIENT
Start: 2024-07-10 | End: 2024-07-16 | Stop reason: HOSPADM

## 2024-07-10 RX ORDER — ACETAMINOPHEN 325 MG/1
650 TABLET ORAL EVERY 6 HOURS PRN
Status: DISCONTINUED | OUTPATIENT
Start: 2024-07-10 | End: 2024-07-16 | Stop reason: HOSPADM

## 2024-07-10 RX ORDER — DIPHENHYDRAMINE HYDROCHLORIDE 50 MG/ML
50 INJECTION INTRAMUSCULAR; INTRAVENOUS EVERY 6 HOURS PRN
Status: DISCONTINUED | OUTPATIENT
Start: 2024-07-10 | End: 2024-07-16 | Stop reason: HOSPADM

## 2024-07-10 RX ORDER — HALOPERIDOL 5 MG/ML
5 INJECTION INTRAMUSCULAR EVERY 6 HOURS PRN
Status: DISCONTINUED | OUTPATIENT
Start: 2024-07-10 | End: 2024-07-16 | Stop reason: HOSPADM

## 2024-07-10 RX ORDER — POLYETHYLENE GLYCOL 3350 17 G/17G
17 POWDER, FOR SOLUTION ORAL DAILY PRN
Status: DISCONTINUED | OUTPATIENT
Start: 2024-07-10 | End: 2024-07-16 | Stop reason: HOSPADM

## 2024-07-10 RX ORDER — LORAZEPAM 1 MG/1
2 TABLET ORAL EVERY 6 HOURS PRN
Status: DISCONTINUED | OUTPATIENT
Start: 2024-07-10 | End: 2024-07-16 | Stop reason: HOSPADM

## 2024-07-10 RX ORDER — MAGNESIUM HYDROXIDE/ALUMINUM HYDROXICE/SIMETHICONE 120; 1200; 1200 MG/30ML; MG/30ML; MG/30ML
30 SUSPENSION ORAL EVERY 6 HOURS PRN
Status: DISCONTINUED | OUTPATIENT
Start: 2024-07-10 | End: 2024-07-16 | Stop reason: HOSPADM

## 2024-07-10 RX ORDER — HYDROXYZINE 50 MG/1
50 TABLET, FILM COATED ORAL 3 TIMES DAILY PRN
Status: DISCONTINUED | OUTPATIENT
Start: 2024-07-10 | End: 2024-07-16 | Stop reason: HOSPADM

## 2024-07-10 RX ORDER — ARIPIPRAZOLE ORAL 1 MG/ML
15 SOLUTION ORAL DAILY
Status: DISCONTINUED | OUTPATIENT
Start: 2024-07-10 | End: 2024-07-11

## 2024-07-10 RX ADMIN — NICOTINE POLACRILEX 4 MG: 4 LOZENGE ORAL at 16:44

## 2024-07-10 RX ADMIN — LORAZEPAM 2 MG: 2 INJECTION INTRAMUSCULAR; INTRAVENOUS at 20:10

## 2024-07-10 RX ADMIN — HALOPERIDOL LACTATE 5 MG: 5 INJECTION, SOLUTION INTRAMUSCULAR at 20:09

## 2024-07-10 RX ADMIN — DIPHENHYDRAMINE HYDROCHLORIDE 50 MG: 50 INJECTION, SOLUTION INTRAMUSCULAR; INTRAVENOUS at 20:09

## 2024-07-10 ASSESSMENT — PATIENT HEALTH QUESTIONNAIRE - PHQ9
1. LITTLE INTEREST OR PLEASURE IN DOING THINGS: SEVERAL DAYS
2. FEELING DOWN, DEPRESSED OR HOPELESS: SEVERAL DAYS
SUM OF ALL RESPONSES TO PHQ QUESTIONS 1-9: 2
SUM OF ALL RESPONSES TO PHQ9 QUESTIONS 1 & 2: 2
SUM OF ALL RESPONSES TO PHQ QUESTIONS 1-9: 2

## 2024-07-10 ASSESSMENT — LIFESTYLE VARIABLES
HOW OFTEN DO YOU HAVE A DRINK CONTAINING ALCOHOL: NEVER
HOW MANY STANDARD DRINKS CONTAINING ALCOHOL DO YOU HAVE ON A TYPICAL DAY: PATIENT DOES NOT DRINK
HOW MANY STANDARD DRINKS CONTAINING ALCOHOL DO YOU HAVE ON A TYPICAL DAY: PATIENT DOES NOT DRINK
HOW OFTEN DO YOU HAVE A DRINK CONTAINING ALCOHOL: NEVER
HOW OFTEN DO YOU HAVE A DRINK CONTAINING ALCOHOL: NEVER
HOW MANY STANDARD DRINKS CONTAINING ALCOHOL DO YOU HAVE ON A TYPICAL DAY: PATIENT DOES NOT DRINK

## 2024-07-10 ASSESSMENT — SLEEP AND FATIGUE QUESTIONNAIRES
DO YOU HAVE DIFFICULTY SLEEPING: NO
AVERAGE NUMBER OF SLEEP HOURS: 6
DO YOU USE A SLEEP AID: NO

## 2024-07-10 ASSESSMENT — PAIN SCALES - GENERAL: PAINLEVEL_OUTOF10: 4

## 2024-07-10 ASSESSMENT — PAIN DESCRIPTION - LOCATION: LOCATION: BACK

## 2024-07-10 ASSESSMENT — PAIN - FUNCTIONAL ASSESSMENT: PAIN_FUNCTIONAL_ASSESSMENT: NONE - DENIES PAIN

## 2024-07-10 NOTE — H&P
Department of Psychiatry  Attending Physician Psychiatric Assessment     Reason for Admission to Psychiatric Unit:  Threat to self requiring 24 hour professional observation  Threat to others requiring 24 hour professional observation  Acute disordered/bizarre behavior or psychomotor agitation or retardation;interferes with ADLs so that patient cannot function at a less intensive care level of care during evaluation and treatment   A mental disorder causing major disability in social, interpersonal, occupational, and/or educational functioning that is leading to dangerous or life-threatening functioning, and that can only be addressed in an acute inpatient setting   A mental disorder that causes an inability to maintain adequate nurtrition or self-care, and family/community support cannot provide reliable, essential care, so that the patient cannont function at a less intensive level of care during evaluation and treatment   Concerns about patient's safety in the community  Past Mental Health Diagnosis: a history of  Schizophrenia  Triggering event or precipitating factor: Alcohol/Drug Relapse and Psych Treatment Noncompliance  Length of time/duration of triggering event: Weeks  Legal Status: patient has legal guardian    CHIEF COMPLAINT:  Acute Psychosis     History obtained from: Patient, electronic medical record          HISTORY OF PRESENT ILLNESS:    Anthony Alan is a 33 y.o. male who has a past medical history of schizophrenia. Patient presented to the ED today via police and [facility] staff on an application for emergency psychiatric admission.  According to documentation, patient has expressed increasing delusions, making verbal threats towards others, including threats to kill a  at [facility].  Patient has fixed delusions that he is in the  and a secret operative for the government.  He lives in a group home and has not been compliant with psychiatric medications.  It is reported he

## 2024-07-10 NOTE — GROUP NOTE
Psych-Ed/Relapse Prevention Group Note        Date: July 10, 2024 Start Time: 2:30pm  End Time:  3:15pm      Number of Participants in Group & Unit Census:  4/14    Topic: Leisure skills    Goal of Group:Patient will identify benefits of leisure for coping and stress management      Comments:     Patient did not participate in Psych-Ed/Relapse Prevention group, despite staff encouragement and explanation of benefits.  Patient remain seclusive to self.  Q15 minute safety checks maintained for patient safety and will continue to encourage patient to attend unit programming.         Signature:  Linda Royal, CTRS

## 2024-07-10 NOTE — BH NOTE
Patient given tobacco quit line number [phone] at this time, refusing to call at this time, states \" I just don't want to quit now\"- patient given information as to the dangers of long-term tobacco use. Continue to reinforce the importance of tobacco cessation.

## 2024-07-10 NOTE — PROGRESS NOTES
Pharmacy Medication History Note      List of current medications patient is taking is complete.    Patient was discharged from [facility] on 6/18/24.  No change to medication list from [facility].    Current Home Medication List at Time of Admission:  Prior to Admission medications    Medication Sig   ARIPiprazole (ABILIFY) 1 MG/ML SOLN solution Take 20 mLs by mouth daily   cyclobenzaprine (FLEXERIL) 10 MG tablet Take 1 tablet by mouth 3 times daily as needed for Muscle spasms   cloNIDine (CATAPRES) 0.1 MG tablet Take 1 tablet by mouth 2 times daily   hydrOXYzine (ATARAX) 10 MG/5ML syrup Take 25 mLs by mouth in the morning and at bedtime   nicotine polacrilex (NICORETTE) 4 MG gum Take 1 each by mouth as needed for Smoking cessation   sertraline (ZOLOFT) 20 MG/ML concentrated solution Take 7.5 mLs by mouth daily   Multiple Vitamins-Minerals (EYE MULTIVITAMIN) CAPS Take 1 tablet by mouth daily   valproic acid (DEPAKENE) 250 MG/5ML SOLN oral solution Take 10 mLs by mouth daily   valproic acid (DEPAKENE) 250 MG/5ML SOLN oral solution Take 20 mLs by mouth nightly       Please let me know if you have any questions about this encounter. Thank you!    Electronically signed by Kinjal Gasca RPH on 7/10/2024 at 2:03 PM

## 2024-07-10 NOTE — BH NOTE
[facility]  Admission Note     Admission Type:   Admission Type: Involuntary    Reason for admission:  Reason for Admission: risk to others due to delusions rooted to working for the [employer] and . paranoid, delusional. verbally agressive with ED staff.      Addictive Behavior:   Addictive Behavior  In the Past 3 Months, Have You Felt or Has Someone Told You That You Have a Problem With  : None    Medical Problems:   Past Medical History:   Diagnosis Date    Hypertension     Schizophrenia, schizo-affective (HCC)        Status EXAM:  Mental Status and Behavioral Exam  Normal: No  Level of Assistance: Independent/Self  Facial Expression: Flat  Affect: Blunt  Level of Consciousness: Alert  Frequency of Checks: 4 times per hour, close  Mood:Normal: No  Mood: Anxious, Suspicious  Motor Activity:Normal: Yes  Eye Contact: Fair  Observed Behavior: Cooperative, Guarded, Preoccupied  Sexual Misconduct History: Current - no  Preception: Prospect to person  Attention:Normal: No  Attention: Hyperalert  Thought Processes: Circumstantial  Thought Content:Normal: No  Thought Content: Poverty of content  Depression Symptoms: Impaired concentration  Anxiety Symptoms: Generalized  Deana Symptoms: Flight of ideas  Hallucinations: None  Delusions: Yes  Delusions: Grandeur, Paranoid  Memory:Normal: No  Memory: Poor recent, Poor remote  Insight and Judgment: No  Insight and Judgment: Poor judgment, Poor insight, Unrealistic    Tobacco Screening:  Practical Counseling, on admission, taryn X, if applicable and completed (first 3 are required if patient doesn't refuse):            ( ) Recognizing danger situations (included triggers and roadblocks)                    ( ) Coping skills (new ways to manage stress,relaxation techniques, changing routine, distraction)                                                           ( ) Basic information about quitting (benefits of quitting, techniques in how to quit, available

## 2024-07-10 NOTE — ED PROVIDER NOTES
[facility] ED  EMERGENCY DEPARTMENT ENCOUNTER      Pt Name: Anthony Alan  MRN: [id number]  Birthdate 1990  Date of evaluation: 7/10/24      CHIEF COMPLAINT       Chief Complaint   Patient presents with    Mental Health Problem         HISTORY OF PRESENT ILLNESS   HPI 33 y.o. male presents for mental health evaluation.  The patient was brought to the emergency department by Police and [facility] staff on an application for emergency psychiatric admission form (pink slip).  According to the pink slip the patient has been delusional, risk to others verbally threatening, believes he is in the .  He threatened to kill a  at his group home because he believed them to be a threat.     Pt has a h/o of mental illness.  Last hospitalization was between June 11th until June 18th.  He receives his outpatient mental health care at the [facility].   Apparently the patient has not taken his psychiatric medications since he left the hospital last.   The patient denied drug use, no attempts at self harm to this point. The patient no medical complaints at this time.    History from the patient is limited as he states that I do not have the government clearance that would allow him to tell me anything.  He's worried that a lot of people here are spies for the [facility] [employer].      REVIEW OF SYSTEMS     Review of Systems  He denies any fevers coughing runny nose vomiting or diarrhea or headache.    PAST MEDICAL HISTORY     Past Medical History:   Diagnosis Date    Hypertension     Schizophrenia, schizo-affective (HCC)        SURGICAL HISTORY     No past surgical history on file.    CURRENT MEDICATIONS       Previous Medications    ARIPIPRAZOLE (ABILIFY) 1 MG/ML SOLN SOLUTION    Take 20 mLs by mouth daily    CLONIDINE (CATAPRES) 0.1 MG TABLET    Take 1 tablet by mouth 2 times daily    CYCLOBENZAPRINE (FLEXERIL) 10 MG TABLET    Take 1 tablet by mouth 3 times daily as needed for Muscle spasms     Pt arrived to unit with transport. Pt A/O x 4. Denies any pain at this time. Pt is ambulatory. PIV to left external jugular patent, dressing CDI. Call light and personal belongings within reach. Treaded socks on. Hourly rounding in place.

## 2024-07-10 NOTE — CARE COORDINATION
BHI Biopsychosocial Assessment    Current Level of Psychosocial Functioning     Independent   Dependent  xxx  Minimal Assist     Comments:    Psychosocial High Risk Factors (check all that apply)    Unable to obtain meds   Chronic illness/pain    Substance abuse   Lack of Family Support   Financial stress   Isolation   Inadequate Community Resources  Suicide attempt(s)  Not taking medications xx  Victim of crime   Developmental Delay  Unable to manage personal needs    Age 65 or older   Homeless  No transportation   Readmission within 30 days xx  Unemployment  Traumatic Event    Comments:   Psychiatric Advanced Directives: none reported     Family to Involve in Treatment:  supportive legal guardian     Sexual Orientation:  n/a     Patient Strengths: supportive legal guardian, linked with [facility], group home     Patient Barriers: recent [facility] hospitalization, chart reports he has not been taking his medication       Opiate Education Provided:  denies       CMHC/mental health history: linked with [facility]     Plan of Care   medication management, group/individual therapies, family meetings, psycho -education, treatment team meetings to assist with stabilization    Initial Discharge Plan:  Romeo from [facility] states Anthony may return to group home at discharge       Clinical Summary:  Anthony is a 33 year old single male who has been admitted to [facility] with report of delusional thoughts/statements, also threatening behavior prior to admission towards group home staff and ACT team. Writer met with Anthony who exhibits hyperverbal, disorganized and tangential responses throughout assessment, focused mainly on the  and his \"war against the Uk[place].\" Writer spoke with Anthony's legal guardian Katie Piemntel who gives verbal consent for treatment and medication, asks consent paperwork to be mailed to him at [email or web address]. Katie gives permission to coordinate with [facility] team. Writer spoke with Romeo from  [facility], states Anthony has been living at the same group home for over a year and is welcome to return back there at discharge pending stability. Writer offered ongoing support and assist during Anthony's stay and coordination of discharge plan and community resources.

## 2024-07-10 NOTE — ED NOTES
Provisional Diagnosis:   Paranoid Schizophrenia    Psychosocial and Contextual Factors:   Patient brought by police and [facility] ACT team after patient was paranoid, delusional and threatening. Patient has a history of paranoid schizophrenia.     Patient: X  Family:   Agency: [facility]    Substance Abuse: Patient dneies.    Present Suicidal Behavior:  Patient denies.    Verbal:     Attempt:    Past Suicidal Behavior: Patient denies     Verbal:    Attempt:      Self-Injurious/Self-Mutilation:Patient denies.      Violence Current or Past Patient reportedly verbally aggressive and threatening prior to arrival.       Trauma Identified:  Patient denies.    Protective Factors:    Patient has housing at a group home.      Clinical Summary:    Anthony Alan is a 33 y.o. male who presents to the ED by Police on an application for emergency admission by the [facility] ACT team.     \"Anthony represents an imminent risk to others due to delusions related to being government ops, and in the . Anthony was discharged from [facility] June 18th and has been off nearly all of his medications, not taking psych meds, and threatened to kill writer as writer was deemed a \"unconfirmed threat.\". Group home  also called this morning, was refused entry to the home as he \"owns the home and others do not have clearance by the . Client pulled out an AARP card to demonstrate clearance by /activity duty. Client demonstrated significant paranoid  and delusions regarding being active  on a current assignment. Client blocked entry to F where other resident stay. Client is also jeopardizing the safety of other resident through his current symptoms and behaviors.\"    Upon arrival patient is rambling to himself, and is preoccupied with the . Patient states \"Those officers who brought me in and those other people are Russians and Ukrainians, and they are trying to start a race war in [place].\" Patient has

## 2024-07-10 NOTE — PROGRESS NOTES
Leisure assessment unable to be completed on this date.   Assessment will be completed on 7/11/2024.

## 2024-07-11 PROCEDURE — 99232 SBSQ HOSP IP/OBS MODERATE 35: CPT | Performed by: PSYCHIATRY & NEUROLOGY

## 2024-07-11 PROCEDURE — 99222 1ST HOSP IP/OBS MODERATE 55: CPT | Performed by: INTERNAL MEDICINE

## 2024-07-11 PROCEDURE — 6370000000 HC RX 637 (ALT 250 FOR IP): Performed by: PSYCHIATRY & NEUROLOGY

## 2024-07-11 PROCEDURE — 1240000000 HC EMOTIONAL WELLNESS R&B

## 2024-07-11 PROCEDURE — APPSS30 APP SPLIT SHARED TIME 16-30 MINUTES: Performed by: NURSE PRACTITIONER

## 2024-07-11 RX ORDER — HALOPERIDOL 5 MG/ML
5 INJECTION INTRAMUSCULAR 2 TIMES DAILY
Status: DISCONTINUED | OUTPATIENT
Start: 2024-07-11 | End: 2024-07-12

## 2024-07-11 RX ORDER — HALOPERIDOL 5 MG/1
5 TABLET ORAL 2 TIMES DAILY
Status: DISCONTINUED | OUTPATIENT
Start: 2024-07-11 | End: 2024-07-12

## 2024-07-11 RX ADMIN — NICOTINE POLACRILEX 4 MG: 4 LOZENGE ORAL at 14:53

## 2024-07-11 RX ADMIN — HALOPERIDOL 5 MG: 5 TABLET ORAL at 20:23

## 2024-07-11 NOTE — BH NOTE
Guardian returned signed injectable/crushed liquid page.  Document placed in patient chart.  Provider notified.

## 2024-07-11 NOTE — BH NOTE
Patient approached the nurses station.  Staff inquired as to what he needed.  Patient responded \"Havent you realized I am deaf?  If I'm deaf, why am I still here? Why would I want life support from you when all I want is a nicotine lozenge and a coffee?  Why would you care?\"    Writer provided patient with his requests.  Patient returned back to room mumbling to self.  Writer unable to determine what was said.   Safety rounding q15 min per protocol.

## 2024-07-11 NOTE — BH NOTE
Patient refused morning medications and vitals assessment. Patient states \"I am not on any medication. Show me a court order that says I have to. No thank you.\"   Patient refused education on medication stating \"show me a court order.\" And then proceeded to close his eyes.  Q15 min rounding in place.

## 2024-07-11 NOTE — BH NOTE
Patient transferred with 2 staff from [facility]. All belongings sent with patient.

## 2024-07-11 NOTE — PROGRESS NOTES
Behavioral Services  Medicare Certification Upon Admission    I certify that this patient's inpatient psychiatric hospital admission is medically necessary for:    [x] (1) Treatment which could reasonably be expected to improve this patient's condition,       [x] (2) Or for diagnostic study;     AND     [x](2) The inpatient psychiatric services are provided while the individual is under the care of a physician and are included in the individualized plan of care.    Estimated length of stay/service 4-7 days    Plan for post-hospital care home with outpatient [facility] f/u    Electronically signed by ALEYDA DALLAS MD on 7/11/2024 at 7:50 AM

## 2024-07-11 NOTE — GROUP NOTE
Psych-Ed/Relapse Prevention Group Note        Date: July 11, 2024 Start Time: 11am  End Time: 11:45am      Number of Participants in Group & Unit Census:  8/15    Topic: Coping skills    Goal of Group:Patient will identify benefits of music for coping and stress management      Comments:     Patient did not participate in Psych-Ed/Relapse Prevention group, despite staff encouragement and explanation of benefits.  Patient remain seclusive to self.  Q15 minute safety checks maintained for patient safety and will continue to encourage patient to attend unit programming.         Signature:  Linda Royal, CTRS

## 2024-07-11 NOTE — GROUP NOTE
Group Therapy Note    Date: 7/11/2024    Group Start Time: 0900  Group End Time: 0930  Group Topic: Group Documentation    David Mcgarry  Goal Group Note        Date: 7/11/2024  Start Time: 0900  End Time: 0930      Number of Participants in Group & Unit Census:  4/15    Topic: goal setting    Goal of Group:Morning goal group session, patient has opportunity to reflect on what they need to accomplish to achieve discharge, and decide on a step towards that that they would like to accomplish by the end of the day today.       Comments:     Patient did not participate in Goal group, despite staff encouragement and explanation of benefits.  Patient remain seclusive to self.  Q15 minute safety checks maintained for patient safety and will continue to encourage patient to attend unit programming.     Modes of Intervention: Support and Socialization      Discipline Responsible: Behavorial Health Tech      Signature:  David Rollins

## 2024-07-11 NOTE — BH NOTE
Patient approached nurses station with the complaint of a rash on bilateral pads of fingers.  \"It is from the shots they gave me last night did this!\"  Scattered healed burn injuries noted on pads of both index fingers, thumbs, scattered healed abrasions to all other digits.    Patient states that he had this before.  \"I am allergic to all antipsychotics and that's what they gave me.  The rash is the burn.  You can see this yellow on my finger.\"   Yellow discoloration noted to right 3rd digit.  \"This happens every time some one tries to give me medications.\"       Patient redirected self back to room.  Observed pacing around bed.  Safety rounding continued q15 min per protocol.

## 2024-07-11 NOTE — PLAN OF CARE
[facility]  Initial Interdisciplinary Treatment Plan Note      Original treatment plan Date & Time: 7/11/2024   1245    Admission Type:  Admission Type: Involuntary    Reason for admission:   Reason for Admission: risk to others due to delusions rooted to working for the [employer] and [employer]. paranoid, delusional. verbally agressive with ED staff.    Estimated Length of Stay:  5-7days  Estimated Discharge Date: To be determined by physician.    PATIENT STRENGTHS:  Patient Strengths:   Patient Strengths and Limitations:Limitations: External locus of control, Unrealistic self-view, Multiple barriers to leisure interests, Difficulty problem solving/relies on others to help solve problems  Addictive Behavior: Addictive Behavior  In the Past 3 Months, Have You Felt or Has Someone Told You That You Have a Problem With  : None  Medical Problems:  Past Medical History:   Diagnosis Date    Hypertension     Schizophrenia, schizo-affective (HCC)      Status EXAM:Mental Status and Behavioral Exam  Normal: No  Level of Assistance: Independent/Self  Facial Expression: Hostile  Affect: Inappropriate, Unstable  Level of Consciousness: Alert  Frequency of Checks: 4 times per hour, close  Mood:Normal: No  Mood: Anxious, Suspicious, Irritable  Motor Activity:Normal: Yes  Motor Activity: Decreased  Eye Contact: Poor (patient refused assessment)  Observed Behavior: Agitated, Hostile, Preoccupied, Guarded  Sexual Misconduct History: Current - no  Preception: [place] to person  Attention:Normal: No  Attention: Unable to concentrate  Thought Processes: Flight of ideas, Loose association  Thought Content:Normal: No  Thought Content: Delusions, Paranoia, Preoccupations  Depression Symptoms: Isolative, Impaired concentration, Increased irritability  Anxiety Symptoms: Generalized  Deana Symptoms: Flight of ideas, Poor judgment  Hallucinations: Unable to assess (patient refused assessment)  Delusions: Yes  Delusions:  Paranoid, Grandeur  Memory:Normal: No  Memory: Poor recent, Poor remote  Insight and Judgment: No  Insight and Judgment: Poor judgment, Poor insight, Unrealistic    EDUCATION:   Learner Progress Toward Treatment Goals: Will review group plans and strategies for care.    Method: Group therapy, Medication compliance, Individualized assessments and Care planning.    Outcome: Needs Reinforcement    PATIENT GOALS: To be discussed with patient within 72 hours    PLAN/TREATMENT RECOMMENDATIONS:     Continue group therapy , Medications compliance, Goal setting, Individualized assessments and Care planning, continue to monitor patient on unit.      SHORT-TERM GOALS:   Time frame for Short-Term Goals: 5-7 days    LONG-TERM GOALS:  Time frame for Long-Term Goals: 6 months  Members Present in Team Meeting: See Signature Sheet    Mayi Siddiqui RN

## 2024-07-11 NOTE — GROUP NOTE
Group Therapy Note    Date: 7/11/2024    Group Start Time: 1000  Group End Time: 1050  Group Topic: Psychotherapy    [facility] Sloane Lei MSW, KENYA        Group Therapy Note    Attendees: 4/15       Patient refused to attend psychotherapy group at 10:00 am after encouragement from staff. 1:1 talk was offered as alternative to group; patient declined.      Discipline Responsible: /Counselor      Signature:  CAITLYN Cisneros, KENYA

## 2024-07-11 NOTE — PLAN OF CARE
Problem: Risk for Elopement  Goal: Patient will not exit the unit/facility without proper excort  Outcome: Progressing     Problem: Behavior  Goal: Pt/Family maintain appropriate behavior and adhere to behavioral management agreement, if implemented  Description: INTERVENTIONS:  1. Assess patient/family's coping skills and  non-compliant behavior (including use of illegal substances)  2. Notify security of behavior or suspected illegal substances which indicate the need for search of the family and/or belongings  3. Encourage verbalization of thoughts and concerns in a socially appropriate manner  4. Utilize positive, consistent limit setting strategies supporting safety of patient, staff and others  5. Encourage participation in the decision making process about the behavioral management agreement  6. If a visitor's behavior poses a threat to safety call refer to organization policy.  7. Initiate consult with , Psychosocial CNS, Spiritual Care as appropriate  Outcome: Progressing     Problem: Anxiety  Goal: Will report anxiety at manageable levels  Description: INTERVENTIONS:  1. Administer medication as ordered  2. Teach and rehearse alternative coping skills  3. Provide emotional support with 1:1 interaction with staff  Outcome: Progressing     Problem: Pain  Goal: Verbalizes/displays adequate comfort level or baseline comfort level  Outcome: Progressing     Note: Patient is complaining of anxiety at this time, feeling restless and having trouble calming self down in order to restfully sleep this evening. PRN medication given as ordered for increased anxiety and sleep.

## 2024-07-11 NOTE — PLAN OF CARE
Problem: Behavior  Goal: Pt/Family maintain appropriate behavior and adhere to behavioral management agreement, if implemented  Description: INTERVENTIONS:  1. Assess patient/family's coping skills and  non-compliant behavior (including use of illegal substances)  2. Notify security of behavior or suspected illegal substances which indicate the need for search of the family and/or belongings  3. Encourage verbalization of thoughts and concerns in a socially appropriate manner  4. Utilize positive, consistent limit setting strategies supporting safety of patient, staff and others  5. Encourage participation in the decision making process about the behavioral management agreement  6. If a visitor's behavior poses a threat to safety call refer to organization policy.  7. Initiate consult with , Psychosocial CNS, Spiritual Care as appropriate  7/11/2024 1309 by Mayi Siddiqui, RN  Outcome: Progressing     Problem: Anxiety  Goal: Will report anxiety at manageable levels  Description: INTERVENTIONS:  1. Administer medication as ordered  2. Teach and rehearse alternative coping skills  3. Provide emotional support with 1:1 interaction with staff  7/11/2024 1309 by Mayi Siddiqui, RN  Outcome: Progressing     Problem: Risk for Elopement  Goal: Patient will not exit the unit/facility without proper excort  7/11/2024 1309 by Mayi Siddiqui, RN  Outcome: Progressing     Patient has remained isolative to room thus far.  Patient has been noncompliant with medications and assessments despite continued encouragement from staff.   Patient irritable and agitated during medication staff stating \"I am not on any medication. Show me a court order that says I have to. No thank you. \" In an aggressive tone.   Patient encouraged to seek staff for any needs or concerns that may arise.  Safe environment maintained.   Safety checks in place q15 min per protocol.

## 2024-07-11 NOTE — H&P
IN-PATIENT SERVICE  [facility] Internal Medicine    CONSULTATION / HISTORY AND PHYSICAL EXAMINATION            Date:   7/11/2024  Patient name:  Anthony Alan  Date of admission:  7/10/2024 11:09 AM  MRN:   [id number]  Account:  [id number]  YOB: 1990  PCP:    No primary care provider on file.  Room:   [address] [place] [address]  Code Status:    Full Code    Physician Requesting Consult: Gabe Horn MD    Reason for Consult:  medical management    Chief Complaint:     Chief Complaint   Patient presents with    Mental Health Problem       History Obtained From:     Patient medical record nursing staff    History of Present Illness:   Patient is 33-year-old male with past medical history of schizophrenia, cannabis abuse, is been admitted at [facility] for further management  Patient is a poor historian, tells me that he had seizure at the age of 3 years and take valproic acid for that next  Currently denies any chest pain shortness of breath    Past Medical History:     Past Medical History:   Diagnosis Date    Hypertension     Schizophrenia, schizo-affective (HCC)         Past Surgical History:     No past surgical history on file.     Medications Prior to Admission:     Prior to Admission medications    Medication Sig Start Date End Date Taking? Authorizing Provider   ARIPiprazole (ABILIFY) 1 MG/ML SOLN solution Take 20 mLs by mouth daily 6/18/24   Amy Crawford MD   cyclobenzaprine (FLEXERIL) 10 MG tablet Take 1 tablet by mouth 3 times daily as needed for Muscle spasms 6/18/24 7/18/24  Amy Crawford MD   cloNIDine (CATAPRES) 0.1 MG tablet Take 1 tablet by mouth 2 times daily    ProviderViki MD   hydrOXYzine (ATARAX) 10 MG/5ML syrup Take 25 mLs by mouth in the morning and at bedtime    Viki Llanes MD   nicotine polacrilex (NICORETTE) 4 MG gum Take 1 each by mouth as needed for Smoking cessation    Viki Llanes MD   sertraline (ZOLOFT) 20 MG/ML  concentrated solution Take 7.5 mLs by mouth daily    Viki Llanes MD   Multiple Vitamins-Minerals (EYE MULTIVITAMIN) CAPS Take 1 tablet by mouth daily    Viki Llanes MD   valproic acid (DEPAKENE) 250 MG/5ML SOLN oral solution Take 10 mLs by mouth daily    Viki Llanes MD   valproic acid (DEPAKENE) 250 MG/5ML SOLN oral solution Take 20 mLs by mouth nightly    ProviderViki MD        Allergies:     Bee venom, Duloxetine, Duloxetine hcl, Tall ragweed, and Risperidone and related    Social History:     Tobacco:    reports that he has been smoking. He has never used smokeless tobacco.  Alcohol:      reports no history of alcohol use.  Drug Use:  reports current drug use. Drug: Marijuana (Weed).    Family History:     No family history on file.    Review of Systems:     Positive and Negative as described in HPI.    CONSTITUTIONAL:  negative for fevers, chills, sweats, fatigue, weight loss  HEENT:  negative for vision, hearing changes, runny nose, throat pain  RESPIRATORY:  negative for shortness of breath, cough, congestion, wheezing.  CARDIOVASCULAR:  negative for chest pain, palpitations.  GASTROINTESTINAL:  negative for nausea, vomiting, diarrhea, constipation, change in bowel habits, abdominal pain   GENITOURINARY:  negative for difficulty of urination, burning with urination, frequency   INTEGUMENT:  negative for rash, skin lesions, easy bruising   HEMATOLOGIC/LYMPHATIC:  negative for swelling/edema   ALLERGIC/IMMUNOLOGIC:  negative for urticaria , itching  ENDOCRINE:  negative increase in drinking, increase in urination, hot or cold intolerance  MUSCULOSKELETAL:  negative joint pains, muscle aches, swelling of joints  NEUROLOGICAL:  negative for headaches, dizziness, lightheadedness, numbness, pain, tingling extremities  BEHAVIOR/PSYCH:      Physical Exam:     /76   Pulse 80   Temp 98 °F (36.7 °C) (Oral)   Resp 16   Ht 1.702 m (5' 7\")   Wt 75.8 kg (167 lb)   SpO2 97%

## 2024-07-11 NOTE — PROGRESS NOTES
RT ASSESSMENT TREATMENT GOALS    [x]Pt Goal:  Pt will identify 1-2 positive coping skills by time of discharge.    []Pt Goal:  Pt will identify 1-2 positive aspects of self by time of discharge.    [x]Pt Goal:  Pt will remain on task/topic for 15-30 minutes during group by time of discharge.    []Pt Goal:  Pt will identify 1-2 aspects of relapse prevention plan by time of discharge.    []Pt Goal:  Pt will join in conversation with peers 1-2 times per group by time of discharge.    []Pt Goal:  Pt will identify 1-2 new leisure interests by time of discharge.    [x]Pt Goal: Pt will maintain behavorial control until the time of discharge. '

## 2024-07-11 NOTE — BH NOTE
Guardian contacted regarding admission paperwork to be faxed back.  Guardian states that he will not be able to sign and return them until this evening as he is away from the email address papers were sent to.   Staff attempted to find another method of sending to get papers to him.  Guardian responded that there is not an alternative email and that staff will \"get them when they get them.\"      Verified return fax number and email with guardian.

## 2024-07-11 NOTE — PROGRESS NOTES
Daily Progress Note  7/11/2024    Patient Name: Anthony Alan    CHIEF COMPLAINT: Acute psychosis         Reviewed patient's current plan of care and vital signs with nursing staff - Yes, refused vitals today  WNL as of 7/10/2024    Labs reviewed: [x] Yes  No new labs since admission    SUBJECTIVE:    Anthony was seen for follow-up assessment today.  He has been refusing medications and most assessments.  He has been isolative to his room, coming out for meals and needs only.  He continues to present as disorganized and makes frequent illogical and delusional statements to staff when at the desk.  He becomes easily agitated requiring redirection.  He received emergency IM medications last night at 2009 for agitation.  Patient to be transferred to the stepdown unit as he is in need of a lower stimulation environment.  Patient was resting in bed on approach.  Writer knocked and opened the door and found patient to be resting in bed.  Writer attempted to speak and patient interrupted stating \"no thank you!\"  in a loud irritable manner.  He then stated \"I do not want to talk to you\".  Assessment was therefore discontinued.  Order for transfer placed.    Appetite:  [x] Adequate/Unchanged  [] Increased  [] Decreased      Sleep:       [] Adequate/Unchanged  [x] Fair  [] Poor      Group Attendance on Unit:   [] Yes   [] Selectively    [x] No    Compliant with scheduled medications: [] Yes  [x] No    Received emergency medications in past 24 hrs: [x] Yes   [] No    Medication Side Effects: Denies         Mental Status Exam  Level of consciousness: Alert and awake   Appearance: Appropriate attire for setting, resting in bed, with poor grooming and hygiene   Behavior/Motor: Guarded, refuses to engage with interviewer, calm, but irritable  Attitude toward examiner: Uncooperative, inattentive, no eye contact  Speech: loud and interrupting   Mood: Irritable  Affect: Mood congruent  Thought processes: flight of ideas and  solution 500 mg, 500 mg, Oral, BID  ARIPiprazole (ABILIFY) 1 MG/ML solution 15 mg, 15 mg, Oral, Daily    ASSESSMENT  <principal problem not specified>         PATIENT HANDOFF  Patient symptoms are: Unstable for discharge  Transfer order submitted - patient to go to stepdown for lower stimulation environment   Monitor need and frequency of PRN medications.  Encourage participation in groups and milieu.  Medication changes and discharge planning per attending  Follow-up daily while inpatient.     Patient continues to be monitored in the inpatient psychiatric facility at [facility] for safety and stabilization. Patient continues to need, on a daily basis, active treatment furnished directly by or requiring the supervision of inpatient psychiatric personnel.    Electronically signed by ROBERTO Atkinson CNP on 7/11/2024 at 4:22 PM    **This report has been created using voice recognition software. It may contain minor errors which are inherent in voice recognition technology.**     I independently saw and evaluated the patient.  I reviewed the nurse practitioners documentation above.  Principle diagnosis we are treating for is Acute exacerbation of chronic paranoid schizophrenia (HCC). Any additional comments or changes to the nurse practitioners documentation are stated below otherwise agree with assessment.  Plan will be as follows:  Patient paranoid, responding, aggressive and irritable.  Refuses to discuss medications.  Guardian consented for forced medication.  PLAN  Patient s symptoms   show no change  Haldol 5mg po or IM backup if refuses  Attempt to develop insight  Psycho-education conducted.  Supportive Therapy conducted.  Probable discharge is undetermined at this time  Follow-up daily while on inpatient unit

## 2024-07-11 NOTE — GROUP NOTE
Psych-Ed/Relapse Prevention Group Note        Date: July 11, 2024 Start Time: 2pm  End Time:  2:40pm      Number of Participants in Group & Unit Census:  6/14    Topic: Leisure skills    Goal of Group:Patient will identify benefits of leisure for coping and relaxation      Comments:     Patient did not participate in Psych-Ed/Relapse Prevention group, despite staff encouragement and explanation of benefits.  Patient remain seclusive to self.  Q15 minute safety checks maintained for patient safety and will continue to encourage patient to attend unit programming.         Signature:  Linda Royal, CTRS

## 2024-07-11 NOTE — BH NOTE
Received admission packet from Guardian; however, was missing the injectable/crushed/liquid page. Left VM for Guardian and re-emailed missing page to be signed by Guardian. Sent to [email or web address]

## 2024-07-12 PROCEDURE — 99232 SBSQ HOSP IP/OBS MODERATE 35: CPT | Performed by: PSYCHIATRY & NEUROLOGY

## 2024-07-12 PROCEDURE — 6370000000 HC RX 637 (ALT 250 FOR IP): Performed by: NURSE PRACTITIONER

## 2024-07-12 PROCEDURE — 6370000000 HC RX 637 (ALT 250 FOR IP): Performed by: PSYCHIATRY & NEUROLOGY

## 2024-07-12 PROCEDURE — 1240000000 HC EMOTIONAL WELLNESS R&B

## 2024-07-12 PROCEDURE — APPSS30 APP SPLIT SHARED TIME 16-30 MINUTES: Performed by: NURSE PRACTITIONER

## 2024-07-12 PROCEDURE — 99231 SBSQ HOSP IP/OBS SF/LOW 25: CPT | Performed by: INTERNAL MEDICINE

## 2024-07-12 RX ORDER — PALIPERIDONE 6 MG/1
6 TABLET, EXTENDED RELEASE ORAL DAILY
Status: DISCONTINUED | OUTPATIENT
Start: 2024-07-12 | End: 2024-07-16 | Stop reason: HOSPADM

## 2024-07-12 RX ORDER — HALOPERIDOL 5 MG/ML
7.5 INJECTION INTRAMUSCULAR DAILY
Status: DISCONTINUED | OUTPATIENT
Start: 2024-07-12 | End: 2024-07-16 | Stop reason: HOSPADM

## 2024-07-12 RX ADMIN — PALIPERIDONE 6 MG: 6 TABLET, EXTENDED RELEASE ORAL at 10:12

## 2024-07-12 RX ADMIN — SERTRALINE HYDROCHLORIDE 50 MG: 20 SOLUTION, CONCENTRATE ORAL at 09:48

## 2024-07-12 RX ADMIN — NICOTINE POLACRILEX 4 MG: 4 LOZENGE ORAL at 10:14

## 2024-07-12 RX ADMIN — VALPROIC ACID 500 MG: 250 SOLUTION ORAL at 20:28

## 2024-07-12 RX ADMIN — VALPROIC ACID 500 MG: 250 SOLUTION ORAL at 09:43

## 2024-07-12 RX ADMIN — ACETAMINOPHEN 650 MG: 325 TABLET ORAL at 14:18

## 2024-07-12 ASSESSMENT — PAIN DESCRIPTION - ORIENTATION: ORIENTATION: LOWER

## 2024-07-12 ASSESSMENT — PAIN SCALES - GENERAL: PAINLEVEL_OUTOF10: 4

## 2024-07-12 ASSESSMENT — PAIN DESCRIPTION - LOCATION: LOCATION: BACK

## 2024-07-12 ASSESSMENT — PAIN DESCRIPTION - DESCRIPTORS: DESCRIPTORS: ACHING

## 2024-07-12 NOTE — PROGRESS NOTES
IN-PATIENT SERVICE  [facility] Internal Medicine    CONSULTATION / HISTORY AND PHYSICAL EXAMINATION            Date:   7/12/2024  Patient name:  Anthony Alan  Date of admission:  7/10/2024 11:09 AM  MRN:   [id number]  Account:  [id number]  YOB: 1990  PCP:    No primary care provider on file.  Room:   [address] [place] [address]  Code Status:    Full Code    Physician Requesting Consult: Gabe Horn MD    Reason for Consult:  medical management    Chief Complaint:     Chief Complaint   Patient presents with    Mental Health Problem       History Obtained From:     Patient medical record nursing staff    History of Present Illness:   Patient is 33-year-old male with past medical history of schizophrenia, cannabis abuse, is been admitted at [facility] for further management  Patient is a poor historian, tells me that he had seizure at the age of 3 years and take valproic acid for that next  Currently denies any chest pain shortness of breath    Past Medical History:     Past Medical History:   Diagnosis Date    Hypertension     Schizophrenia, schizo-affective (HCC)         Past Surgical History:     No past surgical history on file.     Medications Prior to Admission:     Prior to Admission medications    Medication Sig Start Date End Date Taking? Authorizing Provider   ARIPiprazole (ABILIFY) 1 MG/ML SOLN solution Take 20 mLs by mouth daily 6/18/24   Amy Crawford MD   cyclobenzaprine (FLEXERIL) 10 MG tablet Take 1 tablet by mouth 3 times daily as needed for Muscle spasms 6/18/24 7/18/24  Amy Crawford MD   cloNIDine (CATAPRES) 0.1 MG tablet Take 1 tablet by mouth 2 times daily    ProviderViki MD   hydrOXYzine (ATARAX) 10 MG/5ML syrup Take 25 mLs by mouth in the morning and at bedtime    Viki Llanes MD   nicotine polacrilex (NICORETTE) 4 MG gum Take 1 each by mouth as needed for Smoking cessation    Viki Llanes MD   sertraline (ZOLOFT) 20 MG/ML

## 2024-07-12 NOTE — CARE COORDINATION
Writer spoke with Anthony's legal guardian Katie Pimentel to offer update, nurse states Anthony allowed vital signs to be taken today, has taken medication. Writer also updated Anthony of MD's change in Anthony's medications, considering giving medication as a long acting injectable. Katie also states he has been in contact with the group home owner [facility]arn[facility] that she may consider not taking Anthony back to group home, however, considering Anthony's stability and potential LOMBARDI this may cause the group home owner to accept Anthony back. [facility], who states ongoing challenge has been Anthony to be consistent with his oral medication.

## 2024-07-12 NOTE — PLAN OF CARE
Problem: Risk for Elopement  Goal: Patient will not exit the unit/facility without proper excort  Outcome: Progressing  Note: Patient does not attempt to elope this shift. 15 min safety checks continue      Problem: Behavior  Goal: Pt/Family maintain appropriate behavior and adhere to behavioral management agreement, if implemented  Description: INTERVENTIONS:  1. Assess patient/family's coping skills and  non-compliant behavior (including use of illegal substances)  2. Notify security of behavior or suspected illegal substances which indicate the need for search of the family and/or belongings  3. Encourage verbalization of thoughts and concerns in a socially appropriate manner  4. Utilize positive, consistent limit setting strategies supporting safety of patient, staff and others  5. Encourage participation in the decision making process about the behavioral management agreement  6. If a visitor's behavior poses a threat to safety call refer to organization policy.  7. Initiate consult with , Psychosocial CNS, Spiritual Care as appropriate  Outcome: Progressing  Note: Patient initially declined all medication and assessments. Patient I after breakfast came to writer requesting medication list. Writer reviews with patient with psychiatrist present, patient agrees to take medication and is agreeable to assessment. Patient is cooperative and more social with staff. Delusions continue as well as somatic symptoms of neurological symptoms.     Problem: Anxiety  Goal: Will report anxiety at manageable levels  Description: INTERVENTIONS:  1. Administer medication as ordered  2. Teach and rehearse alternative coping skills  3. Provide emotional support with 1:1 interaction with staff  Outcome: Progressing  Note: Patient denies any symptoms but patient appears restless.      Problem: Pain  Goal: Verbalizes/displays adequate comfort level or baseline comfort level  Outcome: Progressing

## 2024-07-12 NOTE — GROUP NOTE
Group Therapy Note    Date: 7/12/2024    Group Start Time: 1000  Group End Time: 1030  Group Topic: Psychotherapy    [facility], KENYA        Group Therapy Note    Attendees: 4/9       Patient was offered group therapy today but declined to participate despite encouragement from staff.  1:1 was offered.       Signature:  CAITLYN Clemente, KENYA

## 2024-07-12 NOTE — PROGRESS NOTES
Daily Progress Note  7/12/2024    Patient Name: Anthony Alan    CHIEF COMPLAINT: Acute psychosis         Reviewed patient's current plan of care and vital signs with nursing staff     Labs reviewed: [x] Yes  No new labs since admission    SUBJECTIVE:      Patient seen face-to-face for follow-up assessment.  He was transitioned to stepdown unit secondary to paranoia and aggressive behaviors. Patient has been selectively in regards to being compliant with his psychotropic medications.  After discussion of his concerns, patient was accepting of his regimen.  He states that he prefers paliperidone to haloperidol as he believes that it contributed to cellulitis.  No signs of this observed presently.  Patient has been withdrawn and isolative.  Irritable on the unit.  He has been able to maintain better behavioral control and Dia stimuli unit and has not received any emergency medications today.  Still quite paranoid and delusional.  Patient is without insight regarding events that led to admission.  He does identify that he is arguing with someone at his group home.  Patient expresses confusion as to why he was brought to the psychiatric unit and why police handcuffed him.  Patient continues to exhibit acute psychosis and would be a threat to self and others in the community.  Requires continued inpatient hospitalization for stability.    Appetite:  [x] Adequate/Unchanged  [] Increased  [] Decreased      Sleep:       [] Adequate/Unchanged  [x] Fair  [] Poor      Group Attendance on Unit:   [] Yes   [] Selectively    [x] No    Compliant with scheduled medications: [] Yes  [x] No    Received emergency medications in past 24 hrs: [x] Yes   [] No    Medication Side Effects: Denies         Mental Status Exam  Level of consciousness: Alert and awake   Appearance: Appropriate attire for setting, resting in bed, with poor grooming and hygiene   Behavior/Motor: Guarded, refuses to engage with interviewer, calm, but

## 2024-07-12 NOTE — GROUP NOTE
Group Therapy Note    Date: 7/12/2024    Group Start Time: 1100  Group End Time: 1145  Group Topic: Cognitive Skills    Joycelyn [facility]    Cognitive Skills Group Note        Date: July 12, 2024 Start Time: 11am  End Time: 11:45am      Number of Participants in Group & Unit Census:  5/9    Topic: cognitive skills     Goal of Group: pt will demonstrate improved coping skills and improved interpersonal relationships       Comments:     Patient did not participate in Cognitive Skills group, despite staff encouragement and explanation of benefits.  Patient remain seclusive to self.  Q15 minute safety checks maintained for patient safety and will continue to encourage patient to attend unit programming.              Signature:  MELIDA ROBISON

## 2024-07-12 NOTE — PLAN OF CARE
Problem: Risk for Elopement  Goal: Patient will not exit the unit/facility without proper excort  7/11/2024 2110 by Perfecto Hurtado, RN  Outcome: Progressing   Does not talk about or attempt to leave unit.  Problem: Behavior  Goal: Pt/Family maintain appropriate behavior and adhere to behavioral management agreement, if implemented  Description: INTERVENTIONS:  1. Assess patient/family's coping skills and  non-compliant behavior (including use of illegal substances)  2. Notify security of behavior or suspected illegal substances which indicate the need for search of the family and/or belongings  3. Encourage verbalization of thoughts and concerns in a socially appropriate manner  4. Utilize positive, consistent limit setting strategies supporting safety of patient, staff and others  5. Encourage participation in the decision making process about the behavioral management agreement  6. If a visitor's behavior poses a threat to safety call refer to organization policy.  7. Initiate consult with , Psychosocial CNS, Spiritual Care as appropriate  7/11/2024 2110 by Perfecto Hurtado, RN  Outcome: Progressing   Became very agitated with me after explaining forced med's but did comply with encouragement & med education. Did not require redirection as he was only verbal.  Problem: Anxiety  Goal: Will report anxiety at manageable levels  Description: INTERVENTIONS:  1. Administer medication as ordered  2. Teach and rehearse alternative coping skills  3. Provide emotional support with 1:1 interaction with staff  7/11/2024 2110 by Perfecto Hurtado, RN  Outcome: Progressing   States his anxiety is fine & he doesn't need med's. Vents to me rather angrily but no aggression.  Problem: Pain  Goal: Verbalizes/displays adequate comfort level or baseline comfort level  7/11/2024 2110 by Perfecto Hurtado, RN  Outcome: Progressing   Denies current pain or discomfort.

## 2024-07-13 LAB
DATE LAST DOSE: ABNORMAL
TME LAST DOSE: 2028 H
VALPROATE SERPL-MCNC: 26 UG/ML (ref 50–125)
VANCOMYCIN DOSE: 500 MG

## 2024-07-13 PROCEDURE — 1240000000 HC EMOTIONAL WELLNESS R&B

## 2024-07-13 PROCEDURE — 6370000000 HC RX 637 (ALT 250 FOR IP): Performed by: NURSE PRACTITIONER

## 2024-07-13 PROCEDURE — 80164 ASSAY DIPROPYLACETIC ACD TOT: CPT

## 2024-07-13 PROCEDURE — 6370000000 HC RX 637 (ALT 250 FOR IP): Performed by: PSYCHIATRY & NEUROLOGY

## 2024-07-13 PROCEDURE — 99232 SBSQ HOSP IP/OBS MODERATE 35: CPT | Performed by: NURSE PRACTITIONER

## 2024-07-13 PROCEDURE — 36415 COLL VENOUS BLD VENIPUNCTURE: CPT

## 2024-07-13 RX ADMIN — NICOTINE POLACRILEX 4 MG: 4 LOZENGE ORAL at 11:49

## 2024-07-13 RX ADMIN — IBUPROFEN 400 MG: 400 TABLET, FILM COATED ORAL at 07:51

## 2024-07-13 RX ADMIN — PALIPERIDONE 6 MG: 6 TABLET, EXTENDED RELEASE ORAL at 07:51

## 2024-07-13 RX ADMIN — VALPROIC ACID 500 MG: 250 SOLUTION ORAL at 07:51

## 2024-07-13 RX ADMIN — SERTRALINE HYDROCHLORIDE 50 MG: 20 SOLUTION, CONCENTRATE ORAL at 07:51

## 2024-07-13 RX ADMIN — POLYETHYLENE GLYCOL 3350 17 G: 17 POWDER, FOR SOLUTION ORAL at 14:27

## 2024-07-13 RX ADMIN — VALPROIC ACID 500 MG: 250 SOLUTION ORAL at 20:29

## 2024-07-13 RX ADMIN — NICOTINE POLACRILEX 4 MG: 4 LOZENGE ORAL at 13:25

## 2024-07-13 RX ADMIN — NICOTINE POLACRILEX 4 MG: 4 LOZENGE ORAL at 06:49

## 2024-07-13 RX ADMIN — HALOPERIDOL 5 MG: 5 TABLET ORAL at 14:27

## 2024-07-13 RX ADMIN — LORAZEPAM 2 MG: 1 TABLET ORAL at 14:27

## 2024-07-13 NOTE — BH NOTE
Emergency PRN Medication Administration Note:      Patient is Agitated as evidence by pacing hallways, increased flight of ideas, stating \"I feel really anxious right now.\" Staff attempted to find and relieve the distress by Talking to patient, Refocusing on new activity, and Offering suggestions Patient accepted PRN medications. Medication Administered as prescribed: Haldol 5mg PO and Ativan 2mg PO. Patient  medication administration.   Will continue to monitor, offer support, and reassess.

## 2024-07-13 NOTE — PROGRESS NOTES
Daily Progress Note  7/13/2024    Patient Name: Anthony Alan    CHIEF COMPLAINT: Acute psychosis         Reviewed patient's current plan of care and vital signs with nursing staff     Labs reviewed: [x] Yes  No new labs since admission    SUBJECTIVE:      Patient seen face-to-face for follow-up assessment.  Cooperative with discussion.  He remains isolative to his room, but staff report he has been able to maintain behavioral control and has not required any emergency medications.  Yesterday he was transitioned to Invega and has been compliant with antipsychotic for the last 2 days.  We reviewed red potential side effects and he denies all.  He does express interest in transitioning to long-acting injectable.  His medications were verified by pharmacy and ED.  Uncertain if he received LOMBARDI recently and would need to verify before administration.  He was previously taking aripiprazole outpatient.    Patient denying any suicidal or homicidal ideation.  He does appear to be somewhat internally preoccupied, but has not been agitated or aggressive or hostile.  He is pleasant.  Showing some improvement in symptoms.  Will likely discharge early next week with continued improvement.    Appetite:  [x] Adequate/Unchanged  [] Increased  [] Decreased      Sleep:       [] Adequate/Unchanged  [x] Fair  [] Poor      Group Attendance on Unit:   [] Yes   [] Selectively    [x] No    Compliant with scheduled medications: [] Yes  [x] No    Received emergency medications in past 24 hrs: [x] Yes   [] No    Medication Side Effects: Denies         Mental Status Exam  Level of consciousness: Alert and awake   Appearance: Appropriate attire for setting, seated on bed, with fair  grooming and hygiene   Behavior/Motor: No psychomotor abnormalities  Attitude toward examiner: More cooperative today, attentive, improving eye contact  Speech: Spontaneous, normal volume and tone  Mood: Patient reports \"feeling good\"  Affect: Blunted  Thought  NEGATIVE  NEGATIVE Final    Comment:       (Positive cutoff 25 ng/mL)                  Cannabinoid Scrn, Ur 07/10/2024 POSITIVE (A)  NEGATIVE Final    Comment:       (Positive cutoff 50 ng/mL)                  Oxycodone Screen, Ur 07/10/2024 NEGATIVE  NEGATIVE Final    Comment:       (Positive cutoff 100 ng/mL)                  Fentanyl, Ur 07/10/2024 NEGATIVE  NEGATIVE Final    Comment:       (Positive cutoff  5 ng/ml)            Test Information 07/10/2024 Assay provides medical screening only.  The absence of expected drug(s) and/or metabolite(s) may indicate diluted or adulterated urine, limitations of testing or timing of collection.   Final    Comment: Testing for legal purposes should be confirmed by another method.  To request confirmation   of test result, please call the lab within 7 days of sample submission.      Salicylate Lvl 07/10/2024 <1.0 (L)  3 - 10 mg/dL Final    Acetaminophen Level 07/10/2024 <5 (L)  10 - 30 ug/mL Final    Valproic Acid Lvl 07/13/2024 26 (L)  50 - 125 ug/mL Final    Valproic Dose amount 07/13/2024 500   Final    Valproic Date last dose 07/13/2024 [id number]   Final    Valproic Time last dose 07/13/2024 2028   Final         Medications  Current Facility-Administered Medications: paliperidone (INVEGA) extended release tablet 6 mg, 6 mg, Oral, Daily **OR** haloperidol lactate (HALDOL) injection 7.5 mg, 7.5 mg, IntraMUSCular, Daily  haloperidol (HALDOL) tablet 5 mg, 5 mg, Oral, Q6H PRN **AND** LORazepam (ATIVAN) tablet 2 mg, 2 mg, Oral, Q6H PRN  haloperidol lactate (HALDOL) injection 5 mg, 5 mg, IntraMUSCular, Q6H PRN **AND** LORazepam (ATIVAN) injection 2 mg, 2 mg, IntraMUSCular, Q6H PRN **AND** diphenhydrAMINE (BENADRYL) injection 50 mg, 50 mg, IntraMUSCular, Q6H PRN  acetaminophen (TYLENOL) tablet 650 mg, 650 mg, Oral, Q6H PRN  ibuprofen (ADVIL;MOTRIN) tablet 400 mg, 400 mg, Oral, Q6H PRN  hydrOXYzine HCl (ATARAX) tablet 50 mg, 50 mg, Oral, TID PRN  traZODone (DESYREL) tablet 50 mg,

## 2024-07-13 NOTE — PLAN OF CARE
Problem: Risk for Elopement  Goal: Patient will not exit the unit/facility without proper excort  Outcome: Progressing  Note: Patient is not currently exit seeking.      Problem: Behavior  Goal: Pt/Family maintain appropriate behavior and adhere to behavioral management agreement, if implemented  Description: INTERVENTIONS:  1. Assess patient/family's coping skills and  non-compliant behavior (including use of illegal substances)  2. Notify security of behavior or suspected illegal substances which indicate the need for search of the family and/or belongings  3. Encourage verbalization of thoughts and concerns in a socially appropriate manner  4. Utilize positive, consistent limit setting strategies supporting safety of patient, staff and others  5. Encourage participation in the decision making process about the behavioral management agreement  6. If a visitor's behavior poses a threat to safety call refer to organization policy.  7. Initiate consult with , Psychosocial CNS, Spiritual Care as appropriate  Outcome: Progressing  Note: Patient took medications as prescribed by doctor.

## 2024-07-13 NOTE — PLAN OF CARE
Problem: Risk for Elopement  Goal: Patient will not exit the unit/facility without proper excort  7/12/2024 2037 by Perfecto Hurtado, RN  Outcome: Progressing   Does not talk about or attempt to leave unit.  Problem: Behavior  Goal: Pt/Family maintain appropriate behavior and adhere to behavioral management agreement, if implemented  Description: INTERVENTIONS:  1. Assess patient/family's coping skills and  non-compliant behavior (including use of illegal substances)  2. Notify security of behavior or suspected illegal substances which indicate the need for search of the family and/or belongings  3. Encourage verbalization of thoughts and concerns in a socially appropriate manner  4. Utilize positive, consistent limit setting strategies supporting safety of patient, staff and others  5. Encourage participation in the decision making process about the behavioral management agreement  6. If a visitor's behavior poses a threat to safety call refer to organization policy.  7. Initiate consult with , Psychosocial CNS, Spiritual Care as appropriate  7/12/2024 2037 by Perfecto Hurtado, RN  Outcome: Progressing   Much calmer this evening taking scheduled med without incident. Does not require redirection or limits.  Problem: Anxiety  Goal: Will report anxiety at manageable levels  Description: INTERVENTIONS:  1. Administer medication as ordered  2. Teach and rehearse alternative coping skills  3. Provide emotional support with 1:1 interaction with staff  7/12/2024 2037 by Perfecto Hurtado, RN  Outcome: Progressing   Appears anxious but states he is fine & declines offer of talk time or prn med.  Problem: Pain  Goal: Verbalizes/displays adequate comfort level or baseline comfort level  7/12/2024 2037 by Perfecto Hurtado, RN  Outcome: Progressing   Denies current pain or discomfort.

## 2024-07-13 NOTE — BH NOTE
Emergency Medication Follow-Up Note:    PRN medication of Haldol 5mg PO and Ativan 2mg PO was effective as evidence by patient resting comfortably. Patient denies medication side effects. Will continue to monitor and provide support as needed.

## 2024-07-13 NOTE — GROUP NOTE
Group Therapy Note    Date: 7/13/2024    Group Start Time: 0900  Group End Time: 0915  Group Topic: Community Meeting    Daniela Hanson    Group Therapy Note    Attendees: 4/9     Patient's Goal: Patient will verbalize today's goals. Patient will also offer                             supportive listening and interact with peers to clarify and                             understand clearly set goals.           Notes: Patient is making progress AEB participating in group discussion, actively                listening, and supporting other group members.                 Status After Intervention: Improved         Participation Level: Active Listener and Interactive         Participation Quality: Appropriate, Attentive, Sharing, and Supportive         Speech: normal         Thought Process/Content: Logical, Linear         Affective Functioning: Congruent         Mood: anxious         Level of consciousness: Oriented x4         Response to Learning: Able to verbalize current knowledge/experience, Able to                                          verbalize/acknowledge new learning, Able to retain                                          information, and Capable of insight          Endings: None Reported         Modes of Intervention: Education, Support, Socialization, and Problem-solving             Discipline Responsible: Behavorial Health Tech         Signature: Daniela Rose

## 2024-07-14 PROCEDURE — 6370000000 HC RX 637 (ALT 250 FOR IP): Performed by: PSYCHIATRY & NEUROLOGY

## 2024-07-14 PROCEDURE — 1240000000 HC EMOTIONAL WELLNESS R&B

## 2024-07-14 PROCEDURE — 99232 SBSQ HOSP IP/OBS MODERATE 35: CPT | Performed by: NURSE PRACTITIONER

## 2024-07-14 PROCEDURE — 6370000000 HC RX 637 (ALT 250 FOR IP): Performed by: NURSE PRACTITIONER

## 2024-07-14 RX ADMIN — VALPROIC ACID 500 MG: 250 SOLUTION ORAL at 19:12

## 2024-07-14 RX ADMIN — NICOTINE POLACRILEX 4 MG: 4 LOZENGE ORAL at 13:40

## 2024-07-14 RX ADMIN — NICOTINE POLACRILEX 4 MG: 4 LOZENGE ORAL at 18:32

## 2024-07-14 RX ADMIN — NICOTINE POLACRILEX 4 MG: 4 LOZENGE ORAL at 16:32

## 2024-07-14 RX ADMIN — PALIPERIDONE 6 MG: 6 TABLET, EXTENDED RELEASE ORAL at 08:59

## 2024-07-14 RX ADMIN — SERTRALINE HYDROCHLORIDE 50 MG: 20 SOLUTION, CONCENTRATE ORAL at 08:59

## 2024-07-14 RX ADMIN — NICOTINE POLACRILEX 4 MG: 4 LOZENGE ORAL at 11:28

## 2024-07-14 RX ADMIN — NICOTINE POLACRILEX 4 MG: 4 LOZENGE ORAL at 07:29

## 2024-07-14 RX ADMIN — VALPROIC ACID 500 MG: 250 SOLUTION ORAL at 08:59

## 2024-07-14 NOTE — PLAN OF CARE
Problem: Risk for Elopement  Goal: Patient will not exit the unit/facility without proper excort  Outcome: Progressing  Note: Patient is not currently exit seeking.      Problem: Behavior  Goal: Pt/Family maintain appropriate behavior and adhere to behavioral management agreement, if implemented  Description: INTERVENTIONS:  1. Assess patient/family's coping skills and  non-compliant behavior (including use of illegal substances)  2. Notify security of behavior or suspected illegal substances which indicate the need for search of the family and/or belongings  3. Encourage verbalization of thoughts and concerns in a socially appropriate manner  4. Utilize positive, consistent limit setting strategies supporting safety of patient, staff and others  5. Encourage participation in the decision making process about the behavioral management agreement  6. If a visitor's behavior poses a threat to safety call refer to organization policy.  7. Initiate consult with , Psychosocial CNS, Spiritual Care as appropriate  Outcome: Progressing  Note: Patient took medication as prescribed by doctor.

## 2024-07-14 NOTE — PROGRESS NOTES
Daily Progress Note  7/14/2024    Patient Name: Anthony Alan    CHIEF COMPLAINT: Acute psychosis         Vitals reviewed: [x] Yes  7/14 BP trending lower today 126/55; HR 75    Labs reviewed: [x] Yes  7/13 Valproic acid 26 (L)    SUBJECTIVE:    Anthony was seen for follow-up assessment today.  He has been compliant with scheduled medications and behaviorally in control.  Patient did request medication intervention for high anxiety and pacing yesterday.  He received oral Haldol 5 mg.  Nursing staff note he was not agitated or hostile and made no threats of violence to others.  Medication had a therapeutic effect and patient was able to regain behavioral control.  Nursing staff report patient continues to report some increased anxiety today but remains cooperative.  He continues to be frequently isolative to his room, coming out for meals and needs only.  Patient was awake and standing in his room on approach.  He presented with brightened affect and improved attention towards conversation.  He expressed that he is \"feeling pretty good\" today.  He remains pleasant and calm throughout conversation.  Patient made no obvious delusional statements and did not appear to be responding to internal stimuli.  He also asked several insightful questions about plan for medication and LOMBARDI.  He was pleased with the plan to move forward before discharge with Invega Srinivasaenna once it is verified tomorrow by pharmacy he has not had any recent LOMBARDI's in the community.  He continues to deny suicidal and homicidal ideation.  He reports he is not experiencing any auditory or visual hallucinations.  Patient feels he will be ready for discharge home.    Appetite:  [x] Adequate/Unchanged  [] Increased  [] Decreased      Sleep:       [] Adequate/Unchanged  [x] Fair  [] Poor      Group Attendance on Unit:   [] Yes   [] Selectively    [x] No    Compliant with scheduled medications: [x] Yes  [] No    Received emergency medications in past 24

## 2024-07-14 NOTE — PLAN OF CARE
Problem: Risk for Elopement  Goal: Patient will not exit the unit/facility without proper excort  7/13/2024 2102 by Perfecto Hurtado, RN  Outcome: Progressing   Does not talk about or attempt to leave unit.  Problem: Behavior  Goal: Pt/Family maintain appropriate behavior and adhere to behavioral management agreement, if implemented  Description: INTERVENTIONS:  1. Assess patient/family's coping skills and  non-compliant behavior (including use of illegal substances)  2. Notify security of behavior or suspected illegal substances which indicate the need for search of the family and/or belongings  3. Encourage verbalization of thoughts and concerns in a socially appropriate manner  4. Utilize positive, consistent limit setting strategies supporting safety of patient, staff and others  5. Encourage participation in the decision making process about the behavioral management agreement  6. If a visitor's behavior poses a threat to safety call refer to organization policy.  7. Initiate consult with , Psychosocial CNS, Spiritual Care as appropriate  7/13/2024 2102 by Perfecto Hurtado, RN  Outcome: Progressing   Has been controlled/cooperative thus far. Does not require limits or redirection.  Problem: Anxiety  Goal: Will report anxiety at manageable levels  Description: INTERVENTIONS:  1. Administer medication as ordered  2. Teach and rehearse alternative coping skills  3. Provide emotional support with 1:1 interaction with staff  Outcome: Progressing  Denies anxiety @ this x. Was medicated earlier for this.   Problem: Pain  Goal: Verbalizes/displays adequate comfort level or baseline comfort level  Outcome: Progressing   Denies pain or discomfort.

## 2024-07-14 NOTE — PLAN OF CARE
Problem: Behavior  Goal: Pt/Family maintain appropriate behavior and adhere to behavioral management agreement, if implemented  Description: INTERVENTIONS:  1. Assess patient/family's coping skills and  non-compliant behavior (including use of illegal substances)  2. Notify security of behavior or suspected illegal substances which indicate the need for search of the family and/or belongings  3. Encourage verbalization of thoughts and concerns in a socially appropriate manner  4. Utilize positive, consistent limit setting strategies supporting safety of patient, staff and others  5. Encourage participation in the decision making process about the behavioral management agreement  6. If a visitor's behavior poses a threat to safety call refer to organization policy.  7. Initiate consult with , Psychosocial CNS, Spiritual Care as appropriate  7/14/2024 1949 by Perfecto Hurtado, RN  Outcome: Progressing   Pleasant/cooperative requesting med. Does not require limits or redirection.  Problem: Anxiety  Goal: Will report anxiety at manageable levels  Description: INTERVENTIONS:  1. Administer medication as ordered  2. Teach and rehearse alternative coping skills  3. Provide emotional support with 1:1 interaction with staff  Outcome: Progressing   States he has anxiety but only wants Ativan which is linked w/Haldol so he declined prn. States he will be ok.  Problem: Pain  Goal: Verbalizes/displays adequate comfort level or baseline comfort level  Outcome: Progressing   Denies pain or discomfort @ this x.

## 2024-07-15 PROBLEM — F20.0 PARANOID SCHIZOPHRENIA, CHRONIC CONDITION (HCC): Status: ACTIVE | Noted: 2024-07-15

## 2024-07-15 PROCEDURE — 90833 PSYTX W PT W E/M 30 MIN: CPT | Performed by: PSYCHIATRY & NEUROLOGY

## 2024-07-15 PROCEDURE — 6370000000 HC RX 637 (ALT 250 FOR IP): Performed by: PSYCHIATRY & NEUROLOGY

## 2024-07-15 PROCEDURE — APPSS30 APP SPLIT SHARED TIME 16-30 MINUTES: Performed by: NURSE PRACTITIONER

## 2024-07-15 PROCEDURE — 99232 SBSQ HOSP IP/OBS MODERATE 35: CPT | Performed by: PSYCHIATRY & NEUROLOGY

## 2024-07-15 PROCEDURE — 1240000000 HC EMOTIONAL WELLNESS R&B

## 2024-07-15 PROCEDURE — 6370000000 HC RX 637 (ALT 250 FOR IP): Performed by: NURSE PRACTITIONER

## 2024-07-15 PROCEDURE — 99231 SBSQ HOSP IP/OBS SF/LOW 25: CPT | Performed by: INTERNAL MEDICINE

## 2024-07-15 RX ADMIN — NICOTINE POLACRILEX 4 MG: 4 LOZENGE ORAL at 19:27

## 2024-07-15 RX ADMIN — PALIPERIDONE 6 MG: 6 TABLET, EXTENDED RELEASE ORAL at 07:53

## 2024-07-15 RX ADMIN — NICOTINE POLACRILEX 4 MG: 4 LOZENGE ORAL at 07:04

## 2024-07-15 RX ADMIN — NICOTINE POLACRILEX 4 MG: 4 LOZENGE ORAL at 14:56

## 2024-07-15 RX ADMIN — VALPROIC ACID 500 MG: 250 SOLUTION ORAL at 21:17

## 2024-07-15 RX ADMIN — SERTRALINE HYDROCHLORIDE 50 MG: 20 SOLUTION, CONCENTRATE ORAL at 07:53

## 2024-07-15 RX ADMIN — NICOTINE POLACRILEX 4 MG: 4 LOZENGE ORAL at 21:30

## 2024-07-15 RX ADMIN — VALPROIC ACID 500 MG: 250 SOLUTION ORAL at 07:53

## 2024-07-15 RX ADMIN — NICOTINE POLACRILEX 4 MG: 4 LOZENGE ORAL at 17:21

## 2024-07-15 NOTE — PROGRESS NOTES
Daily Progress Note  7/15/2024    Patient Name: Anthony Alan    CHIEF COMPLAINT: Acute psychosis         Vitals reviewed: [x] Yes  7/15 BP trending lower today 106/56; HR 72    Labs reviewed: [x] Yes  7/13 Valproic acid 26 (L)    SUBJECTIVE:    Anthony was seen for follow-up assessment today.  He has been compliant with scheduled medications and behaviorally in control.  He has not required any emergency medications for agitation in the past 24 hours.  Nursing staff report he has been pleasant and cooperative.  He is aloof toward peers and prefers to spend his time in his room.  Patient was seated on his bed and agreeable to conversation.  He reported that he was in a good mood today.  Speech is noticeably slowed but he is more linear and logical.  He was not focused on delusional beliefs.  He is looking forward to receiving LOMBARDI and moving forward with discharge this week.  He is denying symptoms of depression.  He is denying auditory and visual hallucinations.  He is denying suicidal and homicidal ideation.  Patient is able to contract for safety in the community.    Appetite:  [x] Adequate/Unchanged  [] Increased  [] Decreased      Sleep:       [] Adequate/Unchanged  [x] Fair  [] Poor      Group Attendance on Unit:   [] Yes   [] Selectively    [x] No    Compliant with scheduled medications: [x] Yes  [] No    Received emergency medications in past 24 hrs: [] Yes   [x] No    Medication Side Effects: Denies         Mental Status Exam  Level of consciousness: Alert and awake   Appearance: Appropriate attire for setting, seated on bed, with fair  grooming and hygiene   Behavior/Motor: Calm, psychomotor slowing  Attitude toward examiner: Cooperative, attentive, good eye contact  Speech: Spontaneous, normal volume, decreased rate, and monotone  Mood: Subdued  Affect: Blunted  Thought processes: Linear and goal-directed  Thought content:  Denies homicidal ideation  Suicidal Ideation: Denies suicidal ideations,  Patient is also willing to take long-acting injectable of Invega Sustenna    PLAN  Patient s symptoms   are improving  Continue with current medication for now with the exception of Invega Sustenna 234 mg IM  Attempt to develop insight  Psycho-education conducted.  Supportive Therapy conducted.  Probable discharge is tomorrow  Follow-up daily while on inpatient unit

## 2024-07-15 NOTE — CARE COORDINATION
Writer spoke with Katie Pimentel, guardian to update on progress, potential discharge and return to group home tomorrow pending receiving long acting injectable. He has no questions/concerns.

## 2024-07-15 NOTE — CARE COORDINATION
Writer spoke with Kinjal from [facility] to advise of Anthony's potential discharge tomorrow. Chart reflects Anthony has not yet had his long acting injectable, Kinjal states this LOMBARDI is an condition for Anthony returning to group home. Kinjal states ACT team willing to transport Anthony tomorrow when discharged, requests update call tomorrow after 9:45am team meeting to further discuss details. Writer also updated that Anthony would be due for second loading dose of LOMBARDI in one week and will require appointment with prescriber.

## 2024-07-15 NOTE — PLAN OF CARE
Problem: Behavior  Goal: Pt/Family maintain appropriate behavior and adhere to behavioral management agreement, if implemented  Description: INTERVENTIONS:  1. Assess patient/family's coping skills and  non-compliant behavior (including use of illegal substances)  2. Notify security of behavior or suspected illegal substances which indicate the need for search of the family and/or belongings  3. Encourage verbalization of thoughts and concerns in a socially appropriate manner  4. Utilize positive, consistent limit setting strategies supporting safety of patient, staff and others  5. Encourage participation in the decision making process about the behavioral management agreement  6. If a visitor's behavior poses a threat to safety call refer to organization policy.  7. Initiate consult with , Psychosocial CNS, Spiritual Care as appropriate  Outcome: Progressing   Patient denies experiencing any thoughts of wanting to harm himself or others, as well as any hallucinations. Patient has been mostly isolative to room, but remains polite and cooperative with staff and peers when he is out for needs and meals. Patient has not been attending group therapy sessions, but staff will continue to encourage participation. Patient appears tired and withdrawn. Patient agrees to notify staff if any thoughts, feelings, or concerns need to be brought to their attention. Q15 minute safety checks maintained.       Problem: Anxiety  Goal: Will report anxiety at manageable levels  Description: INTERVENTIONS:  1. Administer medication as ordered  2. Teach and rehearse alternative coping skills  3. Provide emotional support with 1:1 interaction with staff  Outcome: Progressing     Problem: Pain  Goal: Verbalizes/displays adequate comfort level or baseline comfort level  Outcome: Progressing

## 2024-07-15 NOTE — PROGRESS NOTES
Confirmed with Tres Muro at [facility] ([phone]) where patient blister packs medications, that he has not had any long acting injectables dispensed.    Patient is on the [facility]. Left message for AMINAH Singh to also confirm patient is not currently on a long acting injection.

## 2024-07-15 NOTE — PROGRESS NOTES
IN-PATIENT SERVICE  [facility]    CONSULTATION / HISTORY AND PHYSICAL EXAMINATION            Date:   7/15/2024  Patient name:  Anthony Alan  Date of admission:  7/10/2024 11:09 AM  MRN:   [id number]  Account:  [id number]  YOB: 1990  PCP:    No primary care provider on file.  Room:   [address] [place] [address]  Code Status:    Full Code    Physician Requesting Consult: Gabe Horn MD    Reason for Consult:  medical management    Chief Complaint:     Chief Complaint   Patient presents with    Mental Health Problem       History Obtained From:     Patient medical record nursing staff    History of Present Illness:   Patient is 33-year-old male with past medical history of schizophrenia, cannabis abuse, is been admitted at [facility] for further management  Patient is a poor historian, tells me that he had seizure at the age of 3 years and take valproic acid for that next  Currently denies any chest pain shortness of breath    Past Medical History:     Past Medical History:   Diagnosis Date    Hypertension     Schizophrenia, schizo-affective (HCC)         Past Surgical History:     No past surgical history on file.     Medications Prior to Admission:     Prior to Admission medications    Medication Sig Start Date End Date Taking? Authorizing Provider   ARIPiprazole (ABILIFY) 1 MG/ML SOLN solution Take 20 mLs by mouth daily 6/18/24   Amy Crawford MD   cyclobenzaprine (FLEXERIL) 10 MG tablet Take 1 tablet by mouth 3 times daily as needed for Muscle spasms 6/18/24 7/18/24  Amy Crawford MD   cloNIDine (CATAPRES) 0.1 MG tablet Take 1 tablet by mouth 2 times daily    ProviderViki MD   hydrOXYzine (ATARAX) 10 MG/5ML syrup Take 25 mLs by mouth in the morning and at bedtime    Viki Llanes MD   nicotine polacrilex (NICORETTE) 4 MG gum Take 1 each by mouth as needed for Smoking cessation    Viki Llanes MD   sertraline (ZOLOFT) 20 MG/ML  started'  Leukocytosis likely reactive, no source of infection present, patient denies any chest pain shortness of breath cough, no fever, check UA next    Updated HP from June 13  Valproic acid 500 mg po bid started      Michelet KEELEY Falk MD  7/15/2024  3:44 PM    Copy sent to Dr. Salinas primary care provider on file.    Please note that this chart was generated using voice recognition Dragon dictation software.  Although every effort was made to ensure the accuracy of this automated transcription, some errors in transcription may have occurred.

## 2024-07-16 VITALS
DIASTOLIC BLOOD PRESSURE: 57 MMHG | SYSTOLIC BLOOD PRESSURE: 113 MMHG | WEIGHT: 167 LBS | TEMPERATURE: 97.1 F | HEART RATE: 57 BPM | BODY MASS INDEX: 26.21 KG/M2 | RESPIRATION RATE: 14 BRPM | OXYGEN SATURATION: 97 % | HEIGHT: 67 IN

## 2024-07-16 PROCEDURE — 99239 HOSP IP/OBS DSCHRG MGMT >30: CPT | Performed by: PSYCHIATRY & NEUROLOGY

## 2024-07-16 PROCEDURE — 99231 SBSQ HOSP IP/OBS SF/LOW 25: CPT | Performed by: INTERNAL MEDICINE

## 2024-07-16 PROCEDURE — 6370000000 HC RX 637 (ALT 250 FOR IP): Performed by: NURSE PRACTITIONER

## 2024-07-16 PROCEDURE — 6370000000 HC RX 637 (ALT 250 FOR IP): Performed by: PSYCHIATRY & NEUROLOGY

## 2024-07-16 RX ORDER — SERTRALINE HYDROCHLORIDE 20 MG/ML
50 SOLUTION ORAL DAILY
Qty: 75 ML | Refills: 0 | Status: SHIPPED | OUTPATIENT
Start: 2024-07-16

## 2024-07-16 RX ORDER — VALPROIC ACID 250 MG/5ML
500 SOLUTION ORAL 2 TIMES DAILY
Qty: 600 ML | Refills: 0 | Status: SHIPPED | OUTPATIENT
Start: 2024-07-16

## 2024-07-16 RX ORDER — PALIPERIDONE 6 MG/1
6 TABLET, EXTENDED RELEASE ORAL DAILY
Qty: 6 TABLET | Refills: 0 | Status: SHIPPED | OUTPATIENT
Start: 2024-07-17 | End: 2024-07-23

## 2024-07-16 RX ADMIN — VALPROIC ACID 500 MG: 250 SOLUTION ORAL at 09:13

## 2024-07-16 RX ADMIN — NICOTINE POLACRILEX 4 MG: 4 LOZENGE ORAL at 06:30

## 2024-07-16 RX ADMIN — NICOTINE POLACRILEX 4 MG: 4 LOZENGE ORAL at 15:40

## 2024-07-16 RX ADMIN — PALIPERIDONE 6 MG: 6 TABLET, EXTENDED RELEASE ORAL at 09:13

## 2024-07-16 RX ADMIN — NICOTINE POLACRILEX 4 MG: 4 LOZENGE ORAL at 11:13

## 2024-07-16 RX ADMIN — NICOTINE POLACRILEX 4 MG: 4 LOZENGE ORAL at 12:59

## 2024-07-16 RX ADMIN — SERTRALINE HYDROCHLORIDE 50 MG: 20 SOLUTION, CONCENTRATE ORAL at 09:16

## 2024-07-16 NOTE — PROGRESS NOTES
CLINICAL PHARMACY NOTE: MEDS TO BEDS    Total # of Prescriptions Filled: 2   The following medications were delivered to the patient:  Nicotine Polacrilex 4MG Lozg  Paliperidone ER 6MG Tablets   Additional Documentation:  Sertraline liquid and Valproic Acid not in stock, transferred to [facility] on [facility]. Other two medications were delivered to [facility] and signed for by Alba at 3:32PM 7/16/24

## 2024-07-16 NOTE — TRANSITION OF CARE
Behavioral Health Transition Record    Patient Name: Anthony Alan  YOB: 1990   Medical Record Number: [id number]  Date of Admission: 7/10/2024 11:09 AM   Date of Discharge: 7/16    Attending Provider: Gabe Horn MD   Discharging Provider: Dr Horn  To contact this individual call [phone] and ask the  to page.  If unavailable, ask to be transferred to Behavioral Health Provider on call.  A Behavioral Health Provider will be available on call 24/7 and during holidays.    Primary Care Provider: No primary care provider on file.    Allergies   Allergen Reactions    Bee Venom     Duloxetine     Duloxetine Hcl Diarrhea    Food      Dragon fruit     Tall Ragweed Nausea Only    Risperidone And Related Rash       Reason for Admission: Pt required inpatient hospitalization to [facility] as a result of making threats toward  at [facility]. Pt presented with increased paranoia, delusions that Pt was a danger to self and others in the community.  Pt admitted to for medications management/\stabilization as Pt was not compliant with taking mental health medication.   Presently, Pt paranoia/delusions have significantly reduced. Presently Pt denies thoughts to harm self/others. Pt has received  Invega Sustenna IM inject which will help minimize paranoia/delusions. Pt it follow-up [facility] on [place]. Next Invega Sustenna IM injecction is to be given  on 7/22/24 at [facility]     Admission Diagnosis: Acute psychosis (HCC) [F23]    * No surgery found *    Results for orders placed or performed during the hospital encounter of 07/10/24   CBC with Auto Differential   Result Value Ref Range    WBC 9.6 3.5 - 11.0 k/uL    RBC 5.00 4.5 - 5.9 m/uL    Hemoglobin 16.0 13.5 - 17.5 g/dL    Hematocrit 48.1 41 - 53 %    MCV 96.3 80 - 100 fL    MCH 31.9 26 - 34 pg    MCHC 33.2 31 - 37 g/dL    RDW 14.6 11.5 - 14.9 %    Platelets 379 150 - 450 k/uL    MPV 6.7 6.0 - 12.0 fL    Neutrophils % 62 36 -      Advanced Directive:   Does the patient have an appointed surrogate decision maker? No  Does the patient have a Medical Advance Directive? No  Does the patient have a Psychiatric Advance Directive? No  If the patient does not have a surrogate or Medical Advance Directive AND Psychiatric Advance Directive, the patient was offered information on these advance directives Patient declined to complete    Patient Instructions: Please continue all medications until otherwise directed by physician.  Please [facility] to follow-[facility] with [facility]    Tobacco Cessation Discharge Plan:   Is the patient a tobacco user  and needs referral for tobacco cessation? Yes  Patient referred to the following for tobacco cessation with an appointment? Patient refused  Patient was offered medication to assist with tobacco cessation at discharge? Patient refused    Alcohol/Substance Abuse Discharge Plan:   Does the patient have a history of substance/alcohol abuse and requires a referral for treatment? No  Patient referred to the following for substance/alcohol abuse treatment with an appointment? No  Patient was offered medication to assist with substance/alcohol abuse cessation at discharge? No- denies alcohol/substance abuse.       Patient discharged to: Home; Transition record discussed with patient/caregiver and provided this record in hard copy or electronically

## 2024-07-16 NOTE — BH NOTE
Patient given quit line phone number [phone] at this time, refusing to call at this time, states \" I just don't want to quit now\"-  dangers of longterm tobacco use discussed.  staff will continue to reinforce importance of smoking cessation.

## 2024-07-16 NOTE — CARE COORDINATION
[facility] - P [phone] - F [phone]  [address] [facility] [address]      Phone: [phone]   paliperidone palmitate  MG/ML Zaria IM injection       These medications were sent to [facility] [address] [place] [address] -  [phone] - F [phone]  [address] [place] [address]      Phone: [phone]   nicotine polacrilex 4 MG lozenge  paliperidone 6 MG extended release tablet  sertraline 20 MG/ML concentrated solution  valproic acid 250 MG/5ML Soln oral solution         Follow Up Appointment: Jorge Quick[address] [address]  [phone]    Follow up on 7/22/2024  @105pm with Dr. Altman

## 2024-07-16 NOTE — CARE COORDINATION
[facility] who confirms [facility] will pickup Anthony and transport to group home today at 4:00pm, await followup appointment to be scheduled by [facility].     Writer updated legal gabino Pimentel, no question/concerns.

## 2024-07-16 NOTE — BH NOTE
[facility]al [facility]  Discharge Note    Pt discharged with followings belongings:   Dental Appliances: None  Vision - Corrective Lenses: None  Hearing Aid: None  Jewelry: None  Body Piercings Removed: N/A  Clothing: Footwear, Shorts, Shirt, Pants, Undergarments  Other Valuables: Other (Comment) (credit card, AARP card)   Valuables sent home with Pt  or returned to patient. Patient educated on aftercare instructions: Yes, Pt verbalizes understanding of importance to continue to follow-up with ACT Team, Pt verbalizes understanding of medications scheduled. Copy of AVS placed in Pt belongings bag.    Information faxed to [facility]  by Nurse  at 3:49 PM .Patient verbalize understanding of AVS:  Yes.  Pt to give copy of AVS to ACT team.  Prescription medications that were filled by [facility] placed in belongings bag. Pt sent home with all belongings.  ACT Team member in  [facility] to transport Pt home.   Status EXAM upon discharge:  Mental Status and Behavioral Exam  Normal: No  Level of Assistance: Independent/Self  Facial Expression: Flat  Affect: Blunt  Level of Consciousness: Alert  Frequency of Checks: 4 times per hour, close  Mood:Normal: No  Mood: Anxious  Motor Activity:Normal: No  Motor Activity: Decreased  Eye Contact: Fair  Observed Behavior: Guarded, Cooperative  Sexual Misconduct History: Current - no  Preception: [place] to person, [place] to time, [place] to place, [place] to situation  Attention:Normal: No  Attention: Unable to concentrate  Thought Processes: Blocking  Thought Content:Normal: No  Thought Content: Paranoia  Depression Symptoms: Loss of interest, Impaired concentration  Anxiety Symptoms: Generalized  Deana Symptoms: No problems reported or observed.  Hallucinations: None  Delusions: Yes  Delusions: Paranoid  Memory:Normal: Yes  Memory: Confabulation  Insight and Judgment: No  Insight and Judgment: Poor insight, Poor judgment    Metabolic Screening:    Lab Results   Component  Value Date    LABA1C 5.3 06/11/2024       Lab Results   Component Value Date    CHOL 136 06/13/2024    CHOL 111 01/30/2020    CHOL 131 08/30/2018     Lab Results   Component Value Date    TRIG 70 06/13/2024    TRIG 190 (H) 01/30/2020    TRIG 140 08/30/2018     Lab Results   Component Value Date    HDL 35 (L) 06/13/2024    HDL 31 (L) 01/30/2020    HDL 36 (L) 08/30/2018     No components found for: \"LDLCAL\"  No components found for: \"LABVLDL\"    Alba Leach LPN

## 2024-07-16 NOTE — PROGRESS NOTES
IN-PATIENT SERVICE  [facility] Internal Medicine    CONSULTATION / HISTORY AND PHYSICAL EXAMINATION            Date:   7/16/2024  Patient name:  Anthony Alan  Date of admission:  7/10/2024 11:09 AM  MRN:   [id number]  Account:  [id number]  YOB: 1990  PCP:    No primary care provider on file.  Room:   [address] [place] [address]  Code Status:    Full Code    Physician Requesting Consult: Gabe Horn MD    Reason for Consult:  medical management    Chief Complaint:     Chief Complaint   Patient presents with    Mental Health Problem       History Obtained From:     Patient medical record nursing staff    History of Present Illness:   Patient is 33-year-old male with past medical history of schizophrenia, cannabis abuse, is been admitted at [facility] for further management  Patient is a poor historian, tells me that he had seizure at the age of 3 years and take valproic acid for that next  Currently denies any chest pain shortness of breath    Past Medical History:     Past Medical History:   Diagnosis Date    Hypertension     Schizophrenia, schizo-affective (HCC)         Past Surgical History:     No past surgical history on file.     Medications Prior to Admission:     Prior to Admission medications    Medication Sig Start Date End Date Taking? Authorizing Provider   nicotine polacrilex (COMMIT) 4 MG lozenge Take 1 lozenge by mouth every 2 hours as needed for Smoking cessation 7/16/24  Yes Gabe Horn MD   paliperidone (INVEGA) 6 MG extended release tablet Take 1 tablet by mouth daily for 6 doses 7/17/24 7/23/24 Yes Gabe Horn MD   sertraline (ZOLOFT) 20 MG/ML concentrated solution Take 2.5 mLs by mouth daily 7/16/24  Yes Gabe Horn MD   valproic acid (DEPAKENE) 250 MG/5ML SOLN oral solution Take 10 mLs by mouth 2 times daily 7/16/24  Yes Gabe Horn MD   paliperidone palmitate ER (INVEGA SUSTENNA) 156 MG/ML NATO IM injection Inject 156 mg into the muscle once for 1 dose  affect  Head:  normocephalic, atraumatic.  Eye: no icterus, redness, pupils equal and reactive, extraocular eye movements intact, conjunctiva clear  Ear: normal external ear, no discharge, hearing intact  Nose:  no drainage noted  Mouth: mucous membranes moist  Neck: supple, no carotid bruits, thyroid not palpable  Lungs: Bilateral equal air entry, clear to ausculation, no wheezing, rales or rhonchi, normal effort  Cardiovascular: normal rate, regular rhythm, no murmur, gallop, rub.  Abdomen: Soft, nontender, nondistended, normal bowel sounds, no hepatomegaly or splenomegaly  Neurologic: There are no new focal motor or sensory deficits, normal muscle tone and bulk, no abnormal sensation, normal speech, cranial nerves II through XII grossly intact  Skin: No gross lesions, rashes, bruising or bleeding on exposed skin area  Extremities:  peripheral pulses palpable, no pedal edema or calf pain with palpation  Psych:     Investigations:      Laboratory Testing:  No results found for this or any previous visit (from the past 24 hour(s)).      Consultations:   IP CONSULT TO INTERNAL MEDICINE    Assessment :      Primary Problem  Acute exacerbation of chronic paranoid schizophrenia (HCC)    Active Hospital Problems    Diagnosis Date Noted    Paranoid schizophrenia, chronic condition (HCC) [F20.0] 07/15/2024    Acute exacerbation of chronic paranoid schizophrenia (HCC) [F20.0] 05/13/2023       Plan:   Acute exacerbation of schizophrenia to be managed by psychiatry  Cannabis abuse strongly recommended to quit next   history of seizure disorder question, cannot find in the record, on valproic acid which has been started'  Leukocytosis likely reactive, no source of infection present, patient denies any chest pain shortness of breath cough, no fever, check UA next    Updated HP from June 13  Valproic acid 500 mg po bid started      Michelet KEELEY Falk MD  7/16/2024  1:16 PM    Copy sent to Dr. Salinas primary care provider on

## 2024-07-16 NOTE — DISCHARGE INSTRUCTIONS
Information:  Medications:   Medication summary provided   I understand that I should take only the medications on my list.     -why and when I need to take each medicine.     -which side effects to watch for.     -that I should carry my medication information at all times in case of     Emergency situations.    I will take all of my medicines to follow up appointments.     -check with my physician or pharmacist before taking any new    Medication, over the counter product or drink alcohol.    -Ask about food, drug or dietary supplement interactions.    -discard old lists and update records with medication providers.    Notify Physician:  Notify physician if you notice:   Always call 911 if you feel your life is in danger  In case of an emergency call 911 immediately!  If 911 is not available call your local emergency medical system for help    Behavioral Health Follow Up:  Original Referral Source:  Discharge Diagnosis: Acute psychosis (HCC) [F23]  Recommendations for Level of Care:   Patient status at discharge:   My hospital  was:   Aftercare plan faxed: yes   -faxed by: nurse   -date: 7/16/24   -time: 1230  Prescriptions: [facility]    Smoking: Quit Smoking.   Call the [employer]'s smoking quitline at 1-622-90F-QUIT  Know the signs of a heart attack   If you have any of the following symptoms call 911 immediately, do not wait more    Than five minutes.    1. Pressure, fullness and/ or squeezing in the center of the chest spreading to    The jaw, neck or shoulder.    2. Chest discomfort with light headedness, fainting, sweating, nausea or    Shortness of breath.   3. Upper abdominal pressure or discomfort.   4. Lower chest pain, back pain, unusual fatigue, shortness of breath, nausea   Or dizziness.     General Information:   Questions regarding your bill: Call HELP program [phone]     Suicide Hotline ([facility] Crisis Care Line)  (778) [phone]      Recovery Help line-

## 2024-07-16 NOTE — GROUP NOTE
Group Therapy Note    Date: 7/16/2024    Group Start Time: 1430  Group End Time: 1515  Group Topic: Music Therapy    Aayush Faust    Music Therapy Group Note        Date: 7/16/2024   Start Time: 1430  End Time: 1515      Number of Participants in Group & Unit Census:  6/7    Topic: Patients shared music and analyzed lyrics for themes, sharing general advice based on lyrics within their song selections.     Goal of Group: Goals to increase motivation; Engage in peer support; Increase sense of community; Increase socialization; Demonstrate positive use of time; Increase self-expression      Comments:     Patient did not participate in Music Therapy group, despite staff encouragement and explanation of benefits.  Patient remain seclusive to self.  Q15 minute safety checks maintained for patient safety and will continue to encourage patient to attend unit programming.

## 2024-07-16 NOTE — GROUP NOTE
Psych-Ed/Relapse Prevention Group Note        Date: July 15, 2024 Start Time: 10am  End Time: 10:45am      Number of Participants in Group & Unit Census:  3/8    Topic: Socialization    Goal of Group:Patient will demonstrate improved interpersonal skills      Comments:     Patient did not participate in Psych-Ed/Relapse Prevention group, despite staff encouragement and explanation of benefits.  Patient remain seclusive to self.  Q15 minute safety checks maintained for patient safety and will continue to encourage patient to attend unit programming.         Signature:  DONTAE DanielS

## 2024-07-16 NOTE — GROUP NOTE
Group Therapy Note    Date: 7/16/2024    Group Start Time: 1000  Group End Time: 1015  Group Topic: Psychotherapy    Sloane Murphy MSW, KENYA        Group Therapy Note    Attendees: 3/       Patient refused to attend psychotherapy group at 10:00 am after encouragement from staff. 1:1 talk was offered as alternative to group; patient declined.        Discipline Responsible: /Counselor      Signature:  CAITLYN Cisneros, KENYA

## 2024-07-16 NOTE — DISCHARGE SUMMARY
Provider Discharge Summary     Patient ID:  Anthony Alan  [id number]  33 y.o.  1990    Admit date: 7/10/2024    Discharge date and time: 7/16/2024  4:37 PM     Admitting Physician: Gabe Horn MD     Discharge Physician: Gabe Horn MD    Admission Diagnoses: Acute psychosis (HCC) [F23]    Discharge Diagnoses:      Acute exacerbation of chronic paranoid schizophrenia (HCC)     Patient Active Problem List   Diagnosis Code    Schizophrenia (HCC) F20.9    Cannabis dependence (HCC) F12.20    Chest pain R07.9    Acute psychosis (HCC) F23    Acute exacerbation of chronic paranoid schizophrenia (HCC) F20.0    Cannabis use disorder F12.90    Paranoid schizophrenia, chronic condition ([facility]) F20.0        Admission Condition: poor    Discharged Condition: stable    Indication for Admission: threat to self    History of Present Illnes (present tense wording is of findings from admission exam and are not necessarily indicative of current findings):   Anthony Alan is a 33 y.o. male who has a past medical history of schizophrenia. Patient presented to the ED today via police and [facility] staff on an application for emergency psychiatric admission.  According to documentation, patient has expressed increasing delusions, making verbal threats towards others, including threats to kill a  at [facility].  Patient has fixed delusions that he is in the  and a secret operative for the government.  He lives in a group home and has not been compliant with psychiatric medications.  It is reported he also recently locked the group home owner out of the house stating that they needed special access. Patient has a legal guardian, Newton Pimentel.  The pt has a history of multiple psychiatric admissions to different facilities including [facility], [facility], and [facility]. Patient is well-known to [facility] and was discharged on 6/18/2024.     Patient was seen for initial evaluation today.  Nursing staff report he has been  10 mLs by mouth 2 times daily, Disp-600 mL, R-0Normal           STOP taking these medications       ARIPiprazole (ABILIFY) 1 MG/ML SOLN solution Comments:   Reason for Stopping:         cyclobenzaprine (FLEXERIL) 10 MG tablet Comments:   Reason for Stopping:         cloNIDine (CATAPRES) 0.1 MG tablet Comments:   Reason for Stopping:         hydrOXYzine (ATARAX) 10 MG/5ML syrup Comments:   Reason for Stopping:         nicotine polacrilex (NICORETTE) 4 MG gum Comments:   Reason for Stopping:         Multiple Vitamins-Minerals (EYE MULTIVITAMIN) CAPS Comments:   Reason for Stopping:                Core Measures statement:   Not applicable    Discharge Exam:  Level of consciousness:  Within normal limits  Appearance: Street clothes, seated, with good grooming  Behavior/Motor: No abnormalities noted  Attitude toward examiner:  Cooperative, attentive, good eye contact  Speech:  spontaneous, normal rate, normal volume and well articulated  Mood:  euthymic  Affect:  Full range  Thought processes:  linear, goal directed and coherent  Thought content:  denies homicidal ideation  Suicidal Ideation:  denies suicidal ideation  Delusions:  no evidence of delusions  Perceptual Disturbance:  denies any perceptual disturbance  Cognition:  Intact  Memory: age appropriate  Insight & Judgement: fair  Medication side effects: denies     Disposition: home    Patient Instructions:   Activity: activity as tolerated  1. Patient instructed to take medications regularly and follow up with outpatient appointments.     Follow-up as scheduled with outpatient [facility]:    Electronically signed by ALEYDA DALLAS MD on 7/16/24 at 4:37 PM EDT    Time Spent on discharge is more than 30 minutes in the examination, evaluation, counseling and review of medications and discharge plan.

## 2024-07-16 NOTE — PLAN OF CARE
Problem: Behavior  Goal: Pt/Family maintain appropriate behavior and adhere to behavioral management agreement, if implemented  Description: INTERVENTIONS:  1. Assess patient/family's coping skills and  non-compliant behavior (including use of illegal substances)  2. Notify security of behavior or suspected illegal substances which indicate the need for search of the family and/or belongings  3. Encourage verbalization of thoughts and concerns in a socially appropriate manner  4. Utilize positive, consistent limit setting strategies supporting safety of patient, staff and others  5. Encourage participation in the decision making process about the behavioral management agreement  6. If a visitor's behavior poses a threat to safety call refer to organization policy.  7. Initiate consult with , Psychosocial CNS, Spiritual Care as appropriate  7/15/2024 2211 by Ana Garibay, RN  Outcome: Progressing     Problem: Anxiety  Goal: Will report anxiety at manageable levels  Description: INTERVENTIONS:  1. Administer medication as ordered  2. Teach and rehearse alternative coping skills  3. Provide emotional support with 1:1 interaction with staff  7/15/2024 2211 by Ana Garibay, RN  Outcome: Progressing     Problem: Pain  Goal: Verbalizes/displays adequate comfort level or baseline comfort level  7/15/2024 2211 by Ana Garibay, RN  Outcome: Progressing    Patient is isolative to room, cooperative and polite on approach, suspicious, and guarded upon one to one attempt, behavior is controlled, denies any thoughts or self harm, harm to others, or hallucinations, medication compliant and accepting to new order for Invega Sustenna injection.  Patient accepting to evening snack, denies any pain during assessment, will continue to monitor and provide intervention as needed.

## 2025-06-12 NOTE — BH NOTE
[facility]  Discharge Note    Pt discharged with followings belongings:   Dental Appliances: None  Vision - Corrective Lenses: None  Hearing Aid: None  Jewelry: None  Body Piercings Removed: N/A  Clothing: Footwear, Pants, Shirt  Other Valuables: Money, Lighter/Matches ($37.00)   Valuables sent home with patient. Patient educated on aftercare instructions: yes  Information faxed to  [facility] by staff  at 2:23 PM .Patient verbalize understanding of AVS:  yes. Status EXAM upon discharge:  Mental Status and Behavioral Exam  Normal: Yes  Level of Assistance: Independent/Self  Facial Expression: Flat, Sad  Affect: Appropriate  Level of Consciousness: Alert  Frequency of Checks: 4 times per hour, close  Mood:Normal: No  Mood: Anxious, Suspicious  Motor Activity:Normal: Yes  Eye Contact: Fair  Observed Behavior: Preoccupied, Impulsive  Sexual Misconduct History: Current - no  Preception: [place] to person, [place] to time, [place] to place, [place] to situation  Attention:Normal: No  Attention: Distractible  Thought Processes: Circumstantial, Loose association  Thought Content:Normal: No  Thought Content: Preoccupations, Paranoia  Depression Symptoms: No problems reported or observed.   Anxiety Symptoms: Generalized, Unexplained fears  Deana Symptoms: Flight of ideas, Poor judgment  Hallucinations: None  Delusions: Yes  Delusions: Paranoid  Memory:Normal: No  Memory: Poor recent  Insight and Judgment: No  Insight and Judgment: Poor judgment, Poor insight    Tobacco Screening:  Practical Counseling, on admission, taryn X, if applicable and completed (first 3 are required if patient doesn't refuse) refused :            ( ) Recognizing danger situations (included triggers and roadblocks)                    ( ) Coping skills (new ways to manage stress,relaxation techniques, changing routine, distraction)                                                           ( ) Basic information about quitting (benefits of MORGAN/Encompass Health Rehabilitation Hospital of York-[id number], [id number] review  L Primary MORGAN  [email or web address] at [employer]  [employer]: Movies, reading, education  PCP: Seng Leblanc MD (Inactive)